# Patient Record
Sex: FEMALE | Race: OTHER | Employment: OTHER | URBAN - METROPOLITAN AREA
[De-identification: names, ages, dates, MRNs, and addresses within clinical notes are randomized per-mention and may not be internally consistent; named-entity substitution may affect disease eponyms.]

---

## 2020-01-20 ENCOUNTER — HOSPITAL ENCOUNTER (OUTPATIENT)
Age: 60
Setting detail: SPECIMEN
Discharge: HOME | End: 2020-01-20
Payer: COMMERCIAL

## 2020-01-23 LAB
HPV SAMPLE: NORMAL
HPV, GENOTYPE 16: NOT DETECTED
HPV, GENOTYPE 18: NOT DETECTED
HPV, HIGH RISK OTHER: NOT DETECTED
HPV, INTERPRETATION: NORMAL
SPECIMEN DESCRIPTION: NORMAL

## 2020-01-29 LAB — CYTOLOGY REPORT: NORMAL

## 2021-09-03 ENCOUNTER — HOSPITAL ENCOUNTER (OUTPATIENT)
Dept: WOMENS IMAGING | Age: 61
Discharge: HOME | End: 2021-09-03
Payer: COMMERCIAL

## 2021-09-03 DIAGNOSIS — Z12.31 VISIT FOR SCREENING MAMMOGRAM: ICD-10-CM

## 2021-09-03 PROCEDURE — 77063 BREAST TOMOSYNTHESIS BI: CPT

## 2022-06-28 ENCOUNTER — OFFICE VISIT (OUTPATIENT)
Dept: CARDIOLOGY CLINIC | Age: 62
End: 2022-06-28
Payer: COMMERCIAL

## 2022-06-28 VITALS
WEIGHT: 164 LBS | SYSTOLIC BLOOD PRESSURE: 130 MMHG | DIASTOLIC BLOOD PRESSURE: 78 MMHG | BODY MASS INDEX: 30.96 KG/M2 | HEIGHT: 61 IN | HEART RATE: 105 BPM

## 2022-06-28 DIAGNOSIS — R94.31 ABNORMAL EKG: ICD-10-CM

## 2022-06-28 DIAGNOSIS — Z01.818 PREOPERATIVE CLEARANCE: ICD-10-CM

## 2022-06-28 DIAGNOSIS — I10 PRIMARY HYPERTENSION: ICD-10-CM

## 2022-06-28 DIAGNOSIS — R53.83 OTHER FATIGUE: Primary | ICD-10-CM

## 2022-06-28 PROCEDURE — G8427 DOCREV CUR MEDS BY ELIG CLIN: HCPCS | Performed by: INTERNAL MEDICINE

## 2022-06-28 PROCEDURE — G8417 CALC BMI ABV UP PARAM F/U: HCPCS | Performed by: INTERNAL MEDICINE

## 2022-06-28 PROCEDURE — 99244 OFF/OP CNSLTJ NEW/EST MOD 40: CPT | Performed by: INTERNAL MEDICINE

## 2022-06-28 NOTE — PROGRESS NOTES
[address] [facility] [place] [address]  [place] [address]  Dept: [phone]  Dept Fax: [phone]  Loc: [phone]    Visit Date: 6/28/2022    Ms. Nghia Guerra is a 58 y.o. female  who presented for:    New referral  Abnormal EKG  Preoperative cardiac risk assessment     Notice: family present, Khmer interpretor   HPI:   RC   Brooks James is a pleasant 58year old female patient who  has a past medical history of DDD (degenerative disc disease), Frozen shoulder, GERD (gastroesophageal reflux disease), HTN (hypertension), Hyperlipidemia, Lumbar radiculopathy, Obesity, Osteoarthritis, Tobacco abuse, and Type II or unspecified type diabetes mellitus without mention of complication, not stated as uncontrolled. Patient is scheduled for ACDF C4-C5. She was referred for preoperative cardiac risk assessment, abnormal EKG. EKG revealed SR, possible left atrial enlargement, Q waves in the inferior leads (inferior infarct). The patient is a poor historian. She denies known cardiac history. Her father had CAD, CABG. Patient denies chest pain. She reports fatigue, gets tired more easily.        Current Outpatient Medications:     Dulaglutide (TRULICITY) 3.62 CA/9.4LJ SOPN, Inject 0.75 mg into the skin once a week On Tuesday, Disp: , Rfl:     losartan (COZAAR) 50 MG tablet, Take 50 mg by mouth daily, Disp: , Rfl:     HYDROcodone-acetaminophen (NORCO) 5-325 MG per tablet, Take 1 tablet by mouth every 8 hours as needed for Pain  As needed for pain., Disp: 90 tablet, Rfl: 0    atorvastatin (LIPITOR) 10 MG tablet, Take 1 tablet by mouth daily, Disp: 30 tablet, Rfl: 5    cyclobenzaprine (FLEXERIL) 10 MG tablet, TAKE 1 TABLET BY MOUTH EVERY EVENING (Patient taking differently: Take 10 mg by mouth 2 times daily as needed TAKE 1 TABLET BY MOUTH EVERY EVENING), Disp: 30 tablet, Rfl: 5    meloxicam (MOBIC) 15 MG tablet, Take 1 tablet by mouth daily, Disp: 30 tablet, Rfl: 5    Insulin Pen Needle (B-D ULTRAFINE III SHORT PEN) 31G X 8 MM MISC, 1 each by Does not apply route daily, Disp: 30 each, Rfl: 11    metFORMIN (GLUCOPHAGE) 1000 MG tablet, Take 1 tablet by mouth 2 times daily (with meals), Disp: 60 tablet, Rfl: 5    glucose blood VI test strips (FREESTYLE LITE) strip, Apply 1 each topically three times daily, Disp: 90 each, Rfl: 11    insulin glargine (LANTUS SOLOSTAR) 100 UNIT/ML injection pen, Inject 10 Units into the skin nightly (Patient taking differently: Inject 50 Units into the skin nightly ), Disp: 1 Pen, Rfl: 5    aspirin EC 81 MG EC tablet, Take 1 tablet by mouth daily, Disp: 30 tablet, Rfl: 11    FREESTYLE LANCETS MISC, Apply 1 each topically 3 times daily. , Disp: 100 each, Rfl: 11    glucose monitoring kit (FREESTYLE) monitoring kit, Use as directed, Disp: 1 kit, Rfl: 11    Past Medical History  Jens Barrera  has a past medical history of DDD (degenerative disc disease), Frozen shoulder, GERD (gastroesophageal reflux disease), HTN (hypertension), Hyperlipidemia, Lumbar radiculopathy, Obesity, Osteoarthritis, Tobacco abuse, and Type II or unspecified type diabetes mellitus without mention of complication, not stated as uncontrolled. Social History  Christine  reports that she has been smoking cigarettes. She has a 30.00 pack-year smoking history. She has never used smokeless tobacco. She reports that she does not drink alcohol and does not use drugs. Family History  Jens Barrera family history includes Arthritis in her mother; Diabetes in her brother; Diabetes type 2  in her father; Heart Disease in her father and sister; High Blood Pressure in her sister; Hypertension in her father; Pancreatic Cancer in her brother; Parkinson's Disease in her mother.     Past Surgical History   Past Surgical History:   Procedure Laterality Date     SECTION      x3    SHOULDER SURGERY      left and right       Review of Systems   Constitutional: Negative for chills and fever  HENT: Negative for congestion, sinus pressure, sneezing and sore throat. Eyes: Negative for pain, discharge, redness and itching. Respiratory: Negative for apnea, cough  Gastrointestinal: Negative for blood in stool, constipation, diarrhea   Endocrine: Negative for cold intolerance, heat intolerance, polydipsia. Genitourinary: Negative for dysuria, enuresis, flank pain and hematuria. Musculoskeletal: Negative for arthralgias, joint swelling and neck pain. Neurological: Negative for numbness and headaches. Psychiatric/Behavioral: Negative for agitation, confusion, decreased concentration and dysphoric mood. Objective: There were no vitals taken for this visit. Wt Readings from Last 3 Encounters:   06/14/22 167 lb 3.2 oz (75.8 kg)   10/17/16 196 lb (88.9 kg)   07/19/16 193 lb (87.5 kg)     BP Readings from Last 3 Encounters:   06/14/22 106/67   10/17/16 130/60   07/19/16 134/76       Nursing note and vitals reviewed. Physical Exam   Constitutional: Oriented to person, place, and time. Appears well-developed and well-nourished. ENT: Moist mucous membranes. No bleeding. Tongue is midline. Head: Normocephalic and atraumatic. Eyes: EOM are normal. Pupils are equal, round, and reactive to light. Neck: Normal range of motion. Neck supple. No JVD present. Cardiovascular: Normal rate, regular rhythm, II/vi systolic murmur, no rubs, and intact distal pulses. Pulmonary/Chest: Effort normal and breath sounds normal. No respiratory distress. No wheezes. No rales. Abdominal: Soft. Bowel sounds are normal. No distension. There is no tenderness. Musculoskeletal: Normal range of motion. no edema. Neurological: Alert and oriented to person, place, and time. No cranial nerve deficit. Coordination normal.   Skin: Skin is warm and dry. Psychiatric: Normal mood and affect.        No results found for: CKTOTAL, CKMB, CKMBINDEX    Lab Results   Component Value Date    WBC 13.0 06/06/2022    RBC 4.73 06/06/2022    HGB 13.8 06/06/2022    HCT 41.6 06/06/2022    MCV 87.9 06/06/2022    MCH 29.1 06/06/2022    MCHC 33.1 06/06/2022    RDW 13.0 06/06/2022     06/06/2022    MPV 8.9 05/09/2017       Lab Results   Component Value Date     06/06/2022    K 4.4 06/06/2022     06/06/2022    CO2 24 06/06/2022    BUN 38 06/06/2022    LABALBU 4.2 06/06/2022    CREATININE 1.27 06/06/2022    CALCIUM 9.60 06/06/2022    LABGLOM 74 05/09/2017    GLUCOSE 157 06/06/2022       Lab Results   Component Value Date    ALKPHOS 92 07/02/2014    ALT 26 07/02/2014    AST 23 07/02/2014    PROT 8.0 07/02/2014    BILITOT 0.2 07/02/2014    LABALBU 4.2 06/06/2022       No results found for: MG    No results found for: INR, PROTIME      Lab Results   Component Value Date    LABA1C 10.7 06/06/2022       Lab Results   Component Value Date    TRIG 206 04/03/2014    HDL 29 04/03/2014    LDLCALC 51 04/03/2014       No results found for: TSH      Testing Reviewed:      I have individually reviewed the cardiac test below:    ECHO: No results found for this or any previous visit. AssessmentPlan:   Jose Luis Bone is a pleasant 58year old female patient who  has a past medical history of DDD (degenerative disc disease), Frozen shoulder, GERD (gastroesophageal reflux disease), HTN (hypertension), Hyperlipidemia, Lumbar radiculopathy, Obesity, Osteoarthritis, Tobacco abuse, and Type II or unspecified type diabetes mellitus without mention of complication, not stated as uncontrolled. Patient is scheduled for ACDF C4-C5. She was referred for preoperative cardiac risk assessment, abnormal EKG. EKG revealed SR, possible left atrial enlargement, Q waves in the inferior leads (inferior infarct). The patient is a poor historian. She denies known cardiac history. Her father had CAD, CABG. Patient denies chest pain. She reports fatigue, gets tired more easily. 1. Fatigue   2.  Abnormal EKG  3. Preoperative cardiac risk assessment   4. HTN  5. DM  6. Dyslipidemia  7. Obesity   8. FH of CAD  9. Tobacco abuse      EKG noted   Has multiple risk factors including HTN (hypertension), Hyperlipidemia,Tobacco abuse, and Type II or unspecified type diabetes mellitus    Patient is scheduled for ACDF C4-C5. She was referred for preoperative cardiac risk assessment, abnormal EKG   EKG revealed SR, possible left atrial enlargement, Q waves in the inferior leads (inferior infarct)   The patient reports fatigue    ? Ischemic etiology    Echo, stress test    Risk stratification pending above studies    Smoking: discussed with the patient the importance of smoke cessation especially with the risk of CAD    Above findings and plan of care were discussed with patient in details, patient's questions were answered.      Disposition:  RTC in 12 months             Electronically signed by Margareth Reyes MD, [facility]    6/28/2022 at 1:13 PM EDT

## 2022-07-08 ENCOUNTER — TELEPHONE (OUTPATIENT)
Dept: CARDIOLOGY CLINIC | Age: 62
End: 2022-07-08

## 2022-07-08 NOTE — TELEPHONE ENCOUNTER
Left msg for pt to call office, informed echo has been denied and will be cancelled at this time  Request pt to call office for new arrival time for stress test  Arrive 7:30am, same instructions

## 2022-07-19 ENCOUNTER — HOSPITAL ENCOUNTER (OUTPATIENT)
Dept: NON INVASIVE DIAGNOSTICS | Age: 62
Discharge: HOME | End: 2022-07-19
Payer: COMMERCIAL

## 2022-07-19 ENCOUNTER — APPOINTMENT (OUTPATIENT)
Dept: NON INVASIVE DIAGNOSTICS | Age: 62
End: 2022-07-19
Payer: COMMERCIAL

## 2022-07-19 DIAGNOSIS — R53.83 OTHER FATIGUE: ICD-10-CM

## 2022-07-19 DIAGNOSIS — I10 PRIMARY HYPERTENSION: ICD-10-CM

## 2022-07-19 DIAGNOSIS — R94.31 ABNORMAL EKG: ICD-10-CM

## 2022-07-19 DIAGNOSIS — Z01.818 PREOPERATIVE CLEARANCE: ICD-10-CM

## 2022-07-19 PROCEDURE — 78452 HT MUSCLE IMAGE SPECT MULT: CPT

## 2022-07-19 PROCEDURE — 93017 CV STRESS TEST TRACING ONLY: CPT | Performed by: INTERNAL MEDICINE

## 2022-07-19 PROCEDURE — A9500 TC99M SESTAMIBI: HCPCS | Performed by: INTERNAL MEDICINE

## 2022-07-19 PROCEDURE — 6360000002 HC RX W HCPCS

## 2022-07-19 PROCEDURE — 3430000000 HC RX DIAGNOSTIC RADIOPHARMACEUTICAL: Performed by: INTERNAL MEDICINE

## 2022-07-19 RX ADMIN — Medication 9 MILLICURIE: at 08:00

## 2022-07-19 RX ADMIN — Medication 33.6 MILLICURIE: at 08:45

## 2022-07-20 ENCOUNTER — TELEPHONE (OUTPATIENT)
Dept: CARDIOLOGY CLINIC | Age: 62
End: 2022-07-20

## 2022-07-20 DIAGNOSIS — R94.31 ABNORMAL EKG: ICD-10-CM

## 2022-07-20 DIAGNOSIS — R94.39 ABNORMAL STRESS TEST: Primary | ICD-10-CM

## 2022-07-20 DIAGNOSIS — I10 PRIMARY HYPERTENSION: ICD-10-CM

## 2022-07-20 NOTE — TELEPHONE ENCOUNTER
Given abnormal stress test  Pending [facility]  High probability of CAD  Symptoms  Full Echo is recommended, plz reorder

## 2022-07-21 ENCOUNTER — TELEPHONE (OUTPATIENT)
Dept: CARDIOLOGY CLINIC | Age: 62
End: 2022-07-21

## 2022-07-21 NOTE — TELEPHONE ENCOUNTER
Result note for Stress test Mac Pleva)    ----- Message from Wendy Saha MD sent at 7/20/2022  4:57 PM EDT -----  Schedule [facility] on 8/

## 2022-07-21 NOTE — TELEPHONE ENCOUNTER
Result note for Stress test Rebecca Conn)    ----- Message from Andres Haynes MD sent at 7/20/2022  4:57 PM EDT -----  Schedule [facility] on 8/10 at 7 am

## 2022-07-22 ENCOUNTER — TELEPHONE (OUTPATIENT)
Dept: CARDIOLOGY CLINIC | Age: 62
End: 2022-07-22

## 2022-07-22 NOTE — TELEPHONE ENCOUNTER
PROCEDURE: cardiac cath    DATE OF SERVICE: 08/10/2022    SERVICE LOCATION: [facility]    CPT CODE: 50029    PHYSICIAN: kristy    DATE PRIOR AUTH SUBMITTED: 07/22/2022    STATUS: APPROVED    CASE NUMBER: [id number]    AUTH NUMBER: [id number]    VALID:  08/10/2022-11/10/2022

## 2022-07-27 NOTE — TELEPHONE ENCOUNTER
Case is pending medical review, and is set to  today. Reason they are unable to above is because patient has to have one of the following     -A chest pain syndrome.  -Unexpected chest pain suggesting insufficient blood flow to your heart.  -A heart problem that you were born with.  -Prior cardiac arrest (heart stopped) that was thought to be due to insufficient blood flow  or an infarction (death of tissue). -Prior heart attack. -Known hardening or narrowing of your blood vessels or other heart-related disease.  -Suspected graft or stent (tiny tube) closure.  -Heart related chest pain that happens when you are at rest.  -A defect in your heart that causes the blood to take a path through the heart that is not  normal (shunt), or a passage between two organs or vessels that is not normal (fistula). -Physical damage to your heart.  -Plans for a heart-related surgery.  -High risk problems with decreased blood flow to your heart, and plans for a high-risk  surgery that is not related to your heart. As stated several times in multiple clinical notes, patient does have chest pain syndrome, and patient has tried medication management, and had all required testing completed.      This cardiac cath has been denied two times in the past.     There are no providers available to do a peer to peer today, so if case expires I will have to submit an appeal.

## 2022-07-29 ENCOUNTER — HOSPITAL ENCOUNTER (OUTPATIENT)
Dept: NON INVASIVE DIAGNOSTICS | Age: 62
Discharge: HOME | End: 2022-07-29
Payer: COMMERCIAL

## 2022-07-29 DIAGNOSIS — R94.31 ABNORMAL EKG: ICD-10-CM

## 2022-07-29 DIAGNOSIS — I10 PRIMARY HYPERTENSION: ICD-10-CM

## 2022-07-29 DIAGNOSIS — R94.39 ABNORMAL STRESS TEST: ICD-10-CM

## 2022-07-29 LAB
LV EF: 58 %
LVEF MODALITY: NORMAL

## 2022-07-29 PROCEDURE — 93306 TTE W/DOPPLER COMPLETE: CPT

## 2022-08-09 ENCOUNTER — PREP FOR PROCEDURE (OUTPATIENT)
Dept: CARDIOLOGY | Age: 62
End: 2022-08-09

## 2022-08-09 DIAGNOSIS — Z01.818 PREOP TESTING: Primary | ICD-10-CM

## 2022-08-09 RX ORDER — SODIUM CHLORIDE 0.9 % (FLUSH) 0.9 %
5-40 SYRINGE (ML) INJECTION PRN
Status: CANCELLED | OUTPATIENT
Start: 2022-08-09

## 2022-08-09 RX ORDER — ASPIRIN 325 MG
325 TABLET ORAL ONCE
Status: CANCELLED | OUTPATIENT
Start: 2022-08-09 | End: 2022-08-09

## 2022-08-09 RX ORDER — SODIUM CHLORIDE 9 MG/ML
INJECTION, SOLUTION INTRAVENOUS CONTINUOUS
Status: CANCELLED | OUTPATIENT
Start: 2022-08-09

## 2022-08-09 RX ORDER — SODIUM CHLORIDE 0.9 % (FLUSH) 0.9 %
5-40 SYRINGE (ML) INJECTION EVERY 12 HOURS SCHEDULED
Status: CANCELLED | OUTPATIENT
Start: 2022-08-09

## 2022-08-09 RX ORDER — NITROGLYCERIN 0.4 MG/1
0.4 TABLET SUBLINGUAL EVERY 5 MIN PRN
Status: CANCELLED | OUTPATIENT
Start: 2022-08-09

## 2022-08-09 RX ORDER — SODIUM CHLORIDE 9 MG/ML
INJECTION, SOLUTION INTRAVENOUS PRN
Status: CANCELLED | OUTPATIENT
Start: 2022-08-09

## 2022-08-10 ENCOUNTER — HOSPITAL ENCOUNTER (OUTPATIENT)
Dept: INPATIENT UNIT | Age: 62
Discharge: HOME | End: 2022-08-10
Attending: INTERNAL MEDICINE | Admitting: INTERNAL MEDICINE
Payer: COMMERCIAL

## 2022-08-10 ENCOUNTER — APPOINTMENT (OUTPATIENT)
Dept: INTERVENTIONAL RADIOLOGY/VASCULAR | Age: 62
End: 2022-08-10
Attending: INTERNAL MEDICINE
Payer: COMMERCIAL

## 2022-08-10 ENCOUNTER — APPOINTMENT (OUTPATIENT)
Dept: CT IMAGING | Age: 62
End: 2022-08-10
Attending: INTERNAL MEDICINE
Payer: COMMERCIAL

## 2022-08-10 VITALS
WEIGHT: 168 LBS | HEART RATE: 88 BPM | TEMPERATURE: 98.1 F | RESPIRATION RATE: 22 BRPM | HEIGHT: 61 IN | DIASTOLIC BLOOD PRESSURE: 63 MMHG | OXYGEN SATURATION: 98 % | SYSTOLIC BLOOD PRESSURE: 112 MMHG | BODY MASS INDEX: 31.72 KG/M2

## 2022-08-10 DIAGNOSIS — E78.5 HYPERLIPIDEMIA, UNSPECIFIED HYPERLIPIDEMIA TYPE: ICD-10-CM

## 2022-08-10 PROBLEM — R94.39 ABNORMAL STRESS TEST: Status: ACTIVE | Noted: 2022-08-10

## 2022-08-10 PROBLEM — Z01.818 PREOPERATIVE CLEARANCE: Status: ACTIVE | Noted: 2022-08-10

## 2022-08-10 PROBLEM — I25.10 CAD IN NATIVE ARTERY: Status: ACTIVE | Noted: 2022-08-10

## 2022-08-10 PROBLEM — R53.83 OTHER FATIGUE: Status: ACTIVE | Noted: 2022-08-10

## 2022-08-10 LAB
ABO: NORMAL
ANION GAP SERPL CALCULATED.3IONS-SCNC: 12 MEQ/L (ref 8–16)
ANTIBODY SCREEN: NORMAL
APTT: 31.8 SECONDS (ref 22–38)
BUN BLDV-MCNC: 39 MG/DL (ref 7–22)
CALCIUM SERPL-MCNC: 9.4 MG/DL (ref 8.5–10.5)
CHLORIDE BLD-SCNC: 109 MEQ/L (ref 98–111)
CHOLESTEROL, TOTAL: 167 MG/DL (ref 100–199)
CO2: 20 MEQ/L (ref 23–33)
CREAT SERPL-MCNC: 1.3 MG/DL (ref 0.4–1.2)
ERYTHROCYTE [DISTWIDTH] IN BLOOD BY AUTOMATED COUNT: 13.2 % (ref 11.5–14.5)
ERYTHROCYTE [DISTWIDTH] IN BLOOD BY AUTOMATED COUNT: 44.6 FL (ref 35–45)
GFR SERPL CREATININE-BSD FRML MDRD: 41 ML/MIN/1.73M2
GLUCOSE BLD-MCNC: 93 MG/DL (ref 70–108)
HCT VFR BLD CALC: 39.4 % (ref 37–47)
HDLC SERPL-MCNC: 28 MG/DL
HEMOGLOBIN: 12.4 GM/DL (ref 12–16)
INR BLD: 1.05 (ref 0.85–1.13)
LDL CHOLESTEROL CALCULATED: 83 MG/DL
MCH RBC QN AUTO: 29.1 PG (ref 26–33)
MCHC RBC AUTO-ENTMCNC: 31.5 GM/DL (ref 32.2–35.5)
MCV RBC AUTO: 92.5 FL (ref 81–99)
PLATELET # BLD: 271 THOU/MM3 (ref 130–400)
PMV BLD AUTO: 9.4 FL (ref 9.4–12.4)
POTASSIUM SERPL-SCNC: 4.4 MEQ/L (ref 3.5–5.2)
RBC # BLD: 4.26 MILL/MM3 (ref 4.2–5.4)
RH FACTOR: NORMAL
SODIUM BLD-SCNC: 141 MEQ/L (ref 135–145)
TRIGL SERPL-MCNC: 278 MG/DL (ref 0–199)
WBC # BLD: 13.9 THOU/MM3 (ref 4.8–10.8)

## 2022-08-10 PROCEDURE — 2500000003 HC RX 250 WO HCPCS

## 2022-08-10 PROCEDURE — 86901 BLOOD TYPING SEROLOGIC RH(D): CPT

## 2022-08-10 PROCEDURE — 6360000002 HC RX W HCPCS

## 2022-08-10 PROCEDURE — 6370000000 HC RX 637 (ALT 250 FOR IP): Performed by: PHYSICIAN ASSISTANT

## 2022-08-10 PROCEDURE — 86850 RBC ANTIBODY SCREEN: CPT

## 2022-08-10 PROCEDURE — 93458 L HRT ARTERY/VENTRICLE ANGIO: CPT

## 2022-08-10 PROCEDURE — 85730 THROMBOPLASTIN TIME PARTIAL: CPT

## 2022-08-10 PROCEDURE — 80061 LIPID PANEL: CPT

## 2022-08-10 PROCEDURE — 93971 EXTREMITY STUDY: CPT

## 2022-08-10 PROCEDURE — 85027 COMPLETE CBC AUTOMATED: CPT

## 2022-08-10 PROCEDURE — 86900 BLOOD TYPING SEROLOGIC ABO: CPT

## 2022-08-10 PROCEDURE — 71250 CT THORAX DX C-: CPT

## 2022-08-10 PROCEDURE — 93880 EXTRACRANIAL BILAT STUDY: CPT

## 2022-08-10 PROCEDURE — 93458 L HRT ARTERY/VENTRICLE ANGIO: CPT | Performed by: INTERNAL MEDICINE

## 2022-08-10 PROCEDURE — 2580000003 HC RX 258: Performed by: PHYSICIAN ASSISTANT

## 2022-08-10 PROCEDURE — 36415 COLL VENOUS BLD VENIPUNCTURE: CPT

## 2022-08-10 PROCEDURE — C1769 GUIDE WIRE: HCPCS

## 2022-08-10 PROCEDURE — 93005 ELECTROCARDIOGRAM TRACING: CPT | Performed by: PHYSICIAN ASSISTANT

## 2022-08-10 PROCEDURE — 6360000004 HC RX CONTRAST MEDICATION: Performed by: INTERNAL MEDICINE

## 2022-08-10 PROCEDURE — 99245 OFF/OP CONSLTJ NEW/EST HI 55: CPT | Performed by: THORACIC SURGERY (CARDIOTHORACIC VASCULAR SURGERY)

## 2022-08-10 PROCEDURE — 85610 PROTHROMBIN TIME: CPT

## 2022-08-10 PROCEDURE — 80048 BASIC METABOLIC PNL TOTAL CA: CPT

## 2022-08-10 PROCEDURE — C1894 INTRO/SHEATH, NON-LASER: HCPCS

## 2022-08-10 RX ORDER — SODIUM CHLORIDE 0.9 % (FLUSH) 0.9 %
5-40 SYRINGE (ML) INJECTION PRN
Status: DISCONTINUED | OUTPATIENT
Start: 2022-08-10 | End: 2022-08-10 | Stop reason: HOSPADM

## 2022-08-10 RX ORDER — NITROGLYCERIN 0.4 MG/1
0.4 TABLET SUBLINGUAL EVERY 5 MIN PRN
Status: DISCONTINUED | OUTPATIENT
Start: 2022-08-10 | End: 2022-08-10 | Stop reason: HOSPADM

## 2022-08-10 RX ORDER — ASPIRIN 81 MG/1
81 TABLET ORAL DAILY
Qty: 90 TABLET | Refills: 1 | Status: SHIPPED | OUTPATIENT
Start: 2022-08-10

## 2022-08-10 RX ORDER — ACETAMINOPHEN 325 MG/1
650 TABLET ORAL EVERY 4 HOURS PRN
Status: DISCONTINUED | OUTPATIENT
Start: 2022-08-10 | End: 2022-08-10 | Stop reason: HOSPADM

## 2022-08-10 RX ORDER — SODIUM CHLORIDE 0.9 % (FLUSH) 0.9 %
5-40 SYRINGE (ML) INJECTION EVERY 12 HOURS SCHEDULED
Status: DISCONTINUED | OUTPATIENT
Start: 2022-08-10 | End: 2022-08-10 | Stop reason: HOSPADM

## 2022-08-10 RX ORDER — ATORVASTATIN CALCIUM 40 MG/1
40 TABLET, FILM COATED ORAL DAILY
Qty: 30 TABLET | Refills: 3 | Status: SHIPPED | OUTPATIENT
Start: 2022-08-10 | End: 2022-12-08

## 2022-08-10 RX ORDER — SODIUM CHLORIDE 9 MG/ML
INJECTION, SOLUTION INTRAVENOUS CONTINUOUS
Status: DISCONTINUED | OUTPATIENT
Start: 2022-08-10 | End: 2022-08-10 | Stop reason: HOSPADM

## 2022-08-10 RX ORDER — ASPIRIN 325 MG
325 TABLET ORAL ONCE
Status: COMPLETED | OUTPATIENT
Start: 2022-08-10 | End: 2022-08-10

## 2022-08-10 RX ORDER — SODIUM CHLORIDE 9 MG/ML
INJECTION, SOLUTION INTRAVENOUS PRN
Status: DISCONTINUED | OUTPATIENT
Start: 2022-08-10 | End: 2022-08-10

## 2022-08-10 RX ORDER — SODIUM CHLORIDE 9 MG/ML
INJECTION, SOLUTION INTRAVENOUS PRN
Status: DISCONTINUED | OUTPATIENT
Start: 2022-08-10 | End: 2022-08-10 | Stop reason: HOSPADM

## 2022-08-10 RX ORDER — SODIUM CHLORIDE 0.9 % (FLUSH) 0.9 %
5-40 SYRINGE (ML) INJECTION PRN
Status: DISCONTINUED | OUTPATIENT
Start: 2022-08-10 | End: 2022-08-10

## 2022-08-10 RX ORDER — SODIUM CHLORIDE 0.9 % (FLUSH) 0.9 %
5-40 SYRINGE (ML) INJECTION EVERY 12 HOURS SCHEDULED
Status: DISCONTINUED | OUTPATIENT
Start: 2022-08-10 | End: 2022-08-10

## 2022-08-10 RX ADMIN — IOPAMIDOL 30 ML: 755 INJECTION, SOLUTION INTRAVENOUS at 07:57

## 2022-08-10 RX ADMIN — ASPIRIN 325 MG: 325 TABLET ORAL at 06:08

## 2022-08-10 RX ADMIN — SODIUM CHLORIDE: 9 INJECTION, SOLUTION INTRAVENOUS at 06:02

## 2022-08-10 ASSESSMENT — ENCOUNTER SYMPTOMS
SHORTNESS OF BREATH: 1
EYE DISCHARGE: 0
ABDOMINAL PAIN: 0
COLOR CHANGE: 0

## 2022-08-10 ASSESSMENT — PAIN SCALES - GENERAL: PAINLEVEL_OUTOF10: 0

## 2022-08-10 ASSESSMENT — LIFESTYLE VARIABLES: HOW OFTEN DO YOU HAVE A DRINK CONTAINING ALCOHOL: NEVER

## 2022-08-10 NOTE — PROCEDURES
[address] [id number]                            CARDIAC CATHETERIZATION    PATIENT NAME: Levon Garcia             :        1960  MED REC NO:   [id number]                           ROOM:       0012  ACCOUNT NO:   [de-identified]                           ADMIT DATE: 08/10/2022  PROVIDER:     Marianna Almeida MD    DATE OF PROCEDURE:  08/10/2022    INDICATIONS FOR PROCEDURE:  Abnormal stress test.  Fatigue and angina  equivalent symptoms. Preoperative cardiac risk assessment. Multiple  risk factors for coronary artery disease. Family history of coronary  artery disease. Diabetes mellitus, hypertension, dyslipidemia, obesity  and known history of tobacco abuse. PROCEDURES PERFORMED:  1. Left cardiac catheterization with selective coronary angiogram.  2.  Left ventriculogram.    DESCRIPTION OF PROCEDURE:  After informed consent, the patient was  brought to the cardiac catheterization room. She was prepped and draped  in a sterile fashion. 2% lidocaine was injected in the skin and  subcutaneous tissue overlying the right radial artery. Under ultrasound  guidance using modified Seldinger technique, access was obtained in the  right radial artery. 5/6 Slender sheath was inserted. Standard  antithrombotic/antispasmodic medications were given. I used 5-Swiss  JR4, 5-Swiss JL3.5 diagnostic catheters to complete the procedure. FINDINGS:  LEFT VENTRICULOGRAM:  No regional wall motion abnormality. Ejection  fraction 50% to 55%. HEMODYNAMICS:  Left ventricular end-diastolic pressure 9 mmHg. No  significant pressure gradient across the aortic valve upon pullback. CORONARY ANGIOGRAM:  1. RIGHT CORONARY ARTERY:  Dominant vessel. Proximal RCA is patent. Mid RCA has chronic total occlusion. Distal RCA does receive  left-to-right collaterals.   2.  LEFT MAIN CORONARY ARTERY:  Distal left main coronary artery has 50%  to 60% stenosis. Gives rise to ramus intermedius, left circumflex, and  left anterior descending arteries. 3.  RAMUS INTERMEDIUS:  Proximal part of the vessel has 70% to 80%  stenosis. 4.  LEFT CIRCUMFLEX ARTERY:  Mild-to-moderate diffuse disease,  relatively small, nondominant vessel. 5.  LEFT ANTERIOR DESCENDING ARTERY:  Proximal LAD has 10% to 20%  stenosis. Mid LAD has 80% to 90% stenosis. Distal LAD with mild  diffuse disease. MEDICATIONS:  See MAR. COMPLICATIONS:  None. ESTIMATED BLOOD LOSS:  Less than 50 mL. ACCESS:  Right radial artery access. Vasc Band was applied. Hemostasis  was achieved. IMPRESSION:  Multivessel coronary artery disease including severe  stenosis of left main coronary artery, left anterior descending artery  and ramus intermedius artery. Chronic total occlusion of the right  coronary artery. RECOMMENDATIONS:  Bedrest for the next two hours. Access site care. Medical therapy and aggressive risk factor modification. Aspirin 81 mg  p.o. daily. High intensity statin therapy. Avoid nonsteroidal  anti-inflammatory drugs. The patient has prior prohibitive cardiac risk  for planned spinal surgery, will need to hold off for now. Consult  Cardiovascular Surgery, heart team discussion.         Shari Martínez MD    D: 08/10/2022 8:43:43       T: 08/10/2022 8:46:07     [id number]  Job#: [id number]     Doc#: [id number]    CC:

## 2022-08-10 NOTE — DISCHARGE INSTRUCTIONS
Discharge Instructions for Radial Heart Catherization  1. Take it easy for 3-4 days. 2.  No driving for 2 days. 3.  No lifting of 5 lbs or more for 5 days with the affected arm. 4.  May shower after 24 hours. 5.  Remove arm board after 24 hours. 6.  Apply a band aid to the insertion site daily for 5 days. Wash site daily with soap and water. 7.  No creams, ointments, or powders near the insertion site. 8.  No tub baths, swimming, hot tubs, or hand washing dishes for 1 week. 9.  Watch for signs of infection (redness, warmth, swelling, or pus drainage) or coolness of extremity and call physician if this occurs  10. If bleeding occurs from insertion site, apply pressure and call 911. 11. Watch for numbness/tingling- if this occurs go to the Emergency Room immediately. 12. Apply Ice for 15 minutes with an hour break in between and alternate with heat compress for 15 minutes to reduce swelling for 3-5 days.

## 2022-08-10 NOTE — BRIEF OP NOTE
[address] [address]  Sedation/Analgesia Post Sedation Record    Pt Name: Buffy Nieves  Account number: [de-identified]  MRN: [id number]  YOB: 1960  Procedure Performed By: Gretchen Lee MD MD   Primary Care Physician: Shohba Rojas MD  Date: 8/10/2022    POST-PROCEDURE    Physicians/Assistants: Gretchen Lee MD MD     Procedure Performed:Cath      Sedation/Anesthesia: Versed/ Fentanyl and 2% xylocaine local anesthesia. Estimated Blood Loss: < 50 ml. Specimens Removed: None         Disposition of Specimen: N/A        Complications: No Immediate Complications. Post-procedure Diagnosis/Findings:       MV CAD (Severe stenoses of LM, LAD and RI,  of the RCA)      Recommendations:  Bed rest for the next 2 hours  Access site care  Medical therapy and aggressive risk factor modification   ASA 81 mg PO daily   Statin therapy  Avoid NSAIDs  Patient has high (prohibitive) cardiac risk for the planned spinal surgery  Consult CV surgery, heart team discussion      Above findings and plan of care were discussed with patient (through an interpretor) and her family, questions were answered, agreeable with plan.        Gretchen Lee MD, Jasbir Olguin   Electronically signed 8/10/2022 at 8:05 AM  Interventional Cardiology

## 2022-08-10 NOTE — PROGRESS NOTES
[id number] Patient admitted to 2E12  Ambulatory for [facility]. Patient NPO. Patient accompanied by spouse. Vital signs obtained. Assessment and data collection intiated. Oriented to room. Policies and procedures for 2E explained. All questions answered with no further questions at this time. Fall prevention and safety precautions discussed with patient. 36 Spoke with patient regarding holding metformin after procedure due to dye reaction, voices understanding, instructed patient additional instructions on discharge. Using translation video monitor.

## 2022-08-10 NOTE — PROGRESS NOTES
Patient given discharge instructions and discussed with patient and spouse, sister in law and neice at bedside using translation video monitor. Pt has no further questions at this time. Belongings gathered. Pt leaving for home with spouse. Pt made aware importance of discussing the open heart with her family and to call the office as soon as possible to get scheduled for possible next Friday procedure.

## 2022-08-10 NOTE — CONSULTS
Cardiothoracic Surgery History & Physical       Patient:  Nina Parra  YOB: 1960    MRN: [id number]     Acct: [de-identified]    PCP: Mirian Newberry MD    Date of Admission: 8/10/2022    Chief Complaint:  No chief complaint on file. History Of Present Illness:  58 y.o. diabetic female, no previous history of CAD, originally referred for preoperative clearance for a cervical orthopedic procedure. Abnormal EKG prompted LHC, showing 3v CAD, including severe left main disease, and RCA . Patient reports only chronic progressive fatigue and dyspnea on exertion; denies chest pain, presyncope, or orthopnea. Past Medical History:          Diagnosis Date    CAD in native artery 8/10/2022    DDD (degenerative disc disease)     Frozen shoulder     Left with arthritis    GERD (gastroesophageal reflux disease)     HTN (hypertension) 10/31/2013    Hyperlipidemia 2/3/2014    Lumbar radiculopathy     Obesity     Osteoarthritis     Tobacco abuse     Type II or unspecified type diabetes mellitus without mention of complication, not stated as uncontrolled        Past Surgical History:          Procedure Laterality Date     SECTION      x3    SHOULDER SURGERY      left and right       Medications Prior to Admission:      Prior to Admission medications    Medication Sig Start Date End Date Taking? Authorizing Provider   atorvastatin (LIPITOR) 40 MG tablet Take 1 tablet by mouth in the morning. 8/10/22 12/8/22 Yes Jose Barajas MD   aspirin EC 81 MG EC tablet Take 1 tablet by mouth in the morning. 8/10/22  Yes Jose Barajas MD   Dulaglutide (TRULICITY) 6.07 YY/5.1FL SOPN Inject 0.75 mg into the skin once a week On Tuesday    Historical Provider, MD   losartan (COZAAR) 50 MG tablet Take 50 mg by mouth daily    Historical Provider, MD   HYDROcodone-acetaminophen (NORCO) 5-325 MG per tablet Take 1 tablet by mouth every 8 hours as needed for Pain  As needed for pain.  16 Jacquie Chopra MD   cyclobenzaprine (FLEXERIL) 10 MG tablet TAKE 1 TABLET BY MOUTH EVERY EVENING 10/17/16   Jacquie Chopra MD   Insulin Pen Needle (B-D ULTRAFINE III SHORT PEN) 31G X 8 MM MISC 1 each by Does not apply route daily 10/17/16   Jacquie Chopra MD   meloxicam [facility]) 15 MG tablet Take 1 tablet by mouth daily 10/17/16 8/10/22  Jacquie Chopra MD   metFORMIN (GLUCOPHAGE) 1000 MG tablet Take 1 tablet by mouth 2 times daily (with meals) 7/19/16   Jacquie Chopra MD   glucose blood VI test strips (FREESTYLE LITE) strip Apply 1 each topically three times daily 4/20/16   Jacquie Chopra MD   insulin glargine (LANTUS SOLOSTAR) 100 UNIT/ML injection pen Inject 10 Units into the skin nightly 4/20/16   Jacquie Chopra MD   FREESTYLE LANCETS MISC Apply 1 each topically 3 times daily. 2/3/14   Jacquie Chopra MD   glucose monitoring kit (FREESTYLE) monitoring kit Use as directed 2/3/14   Jacquie Chopra MD       Allergies:  Tramadol    Social History:      TOBACCO:   reports that she has been smoking cigarettes. She has a 30.00 pack-year smoking history. She has never used smokeless tobacco.  ETOH:   reports no history of alcohol use. Social History     Substance and Sexual Activity   Drug Use No         Family History:          Problem Relation Age of Onset    Parkinson's Disease Mother     Arthritis Mother     Diabetes type 2  Father     Heart Disease Father     Hypertension Father     Heart Disease Sister     High Blood Pressure Sister     Pancreatic Cancer Brother     Diabetes Brother        REVIEW OFSYSTEMS:      Review of Systems   Constitutional:  Positive for activity change and fatigue. HENT:  Negative for dental problem. Eyes:  Negative for discharge. Respiratory:  Positive for shortness of breath. Cardiovascular:  Negative for chest pain and palpitations.    Gastrointestinal:  Negative for abdominal pain. Endocrine: Negative for cold intolerance. Genitourinary:  Negative for difficulty urinating. Musculoskeletal:  Positive for neck pain. Skin:  Negative for color change. Allergic/Immunologic: Negative for environmental allergies. Neurological:  Negative for dizziness. Hematological:  Negative for adenopathy. Psychiatric/Behavioral:  Negative for agitation. PHYSICALEXAM:    BP (!) 95/57   Pulse 81   Temp 98.1 °F (36.7 °C) (Oral)   Resp 18   Ht 5' 1\" (1.549 m)   Wt 168 lb (76.2 kg)   SpO2 95%   BMI 31.74 kg/m²     General appearance:  No apparent distress, appears stated age and cooperative. HEENT:  Normal cephalic, atraumatic withoutobvious deformity. Pupils equal, round, and reactive to light. Extra ocular muscles intact. Conjunctivae/corneas clear. Neck: Supple, with full range of motion. No jugular venous distention. Trachea midline. Respiratory:  Normal respiratory effort. Clear to auscultation, bilaterally without Rales/Wheezes/Rhonchi. Cardiovascular:  Regular rate and rhythm with normal S1/S2 without murmurs, rubs or gallops. Abdomen: Soft,non-tender, non-distended with normal bowel sounds. Musculoskeletal:  No clubbing, cyanosis or edemabilaterally. Full range of motion without deformity. Skin: Skin color, texture, turgor normal.  No rashes orlesions. Neurologic:  Neurovascularly intact without any focal sensory/motor deficits. Cranial nerves: II-XIIintact, grossly non-focal.  Psychiatric:  Alert and oriented, thought content appropriate, normal insight  Capillary Refill: Brisk,< 3 seconds   PeripheralPulses: +2 palpable, equal bilaterally       Labs:    Recent Labs     08/10/22  0547   WBC 13.9*   HGB 12.4   HCT 39.4        Recent Labs     08/10/22  0547      K 4.4      CO2 20*   BUN 39*   CREATININE 1.3*   CALCIUM 9.4     No results for input(s): AST, ALT, BILIDIR, BILITOT, ALKPHOS in the last 72 hours.   Recent Labs     08/10/22  0547 INR 1.05     No results for input(s): Pita Rosario in the last 72 hours. Urinalysis:    No results found for: Lucina Kathy, BACTERIA, RBCUA, BLOODU, SPECGRAV, GLUCOSEU    ECHO: Results for orders placed during the hospital encounter of 07/29/22    Echo 2D w doppler w color complete    Narrative  Transthoracic Echocardiography Report (TTE)    Demographics    Patient Name  Eloy Alicia     Gender            Female  Denice Lock    MR #          [id number]         Race              Other    Ethnicity          or     Account #     [de-identified]         Room Number    Accession     [id number]         Date of Study     07/29/2022  Number    Date of Birth 1960        Referring         Tosin Ramirez MD  Physician         Symone Saba MD    Age           58 year(s)        Sonographer       MARIA ELENA Pringle,  RDCS, RDMS, RVT    Interpreting      Hannah Stacy MD  Physician    Procedure    Type of Study    TTE procedure:ECHOCARDIOGRAM COMPLETE 2D W DOPPLER W COLOR. Procedure Date  Date: 07/29/2022 Start: 02:16 PM    Study Location: Echo Lab  Technical Quality: Limited visualization due to body habitus. Indications:Abnormal ECG and Hypertension. Additional Medical History:hyperlipidemia, hypertension, gastroesophageal  reflux disease, osteoarthritis, tobacco abuse, family history of coronary  artery disease, diabetes    Patient Status: Routine    Height: 1 inches Weight: 164 pounds BSA: 0.09 m^2 BMI: 312979.35 kg/m^2    BP: 130/78 mmHg    Conclusions    Summary  Left ventricular size and systolic function is normal. Ejection fraction  was estimated at 55-60%. LV wall thickness is within normal limits and  there are no obvious wall motion abnormalities. The right ventricular size appears normal with normal systolic function  and wall thickness.     Signature    ----------------------------------------------------------------  Electronically signed by Hannah Stacy MD (Interpreting  physician) on 07/30/2022 at 11:41 AM  ----------------------------------------------------------------    Findings    Mitral Valve  The mitral valve structure is normal with normal leaflet separation. DOPPLER: The transmitral velocity was within the normal range with no  evidence for mitral stenosis. There was no evidence of mitral  regurgitation. Aortic Valve  The aortic valve appears trileaflet with normal thickness and leaflet  excursion. DOPPLER: Transaortic velocity was within the normal range with  no evidence of aortic stenosis. There was no evidence of aortic  regurgitation. Tricuspid Valve  The tricuspid valve structure is normal with normal leaflet separation. DOPPLER: There is no evidence of tricuspid stenosis. Mild tricuspid regurgitation visualized. Pulmonic Valve  The pulmonic valve leaflets appear normal thickness, and normal cuspal  separation. DOPPLER: The transpulmonic velocity was within the normal  range. No evidence for regurgitation. Left Atrium  Left atrial size is normal.    Left Ventricle  Left ventricular size and systolic function is normal. Ejection fraction  was estimated at 55-60%. LV wall thickness is within normal limits and  there are no obvious wall motion abnormalities. Right Atrium  Right atrial size was normal.    Right Ventricle  The right ventricular size appears normal with normal systolic function  and wall thickness. Pericardial Effusion  The pericardium appears normal with no evidence of a pericardial effusion. Pleural Effusion  No evidence of pleural effusion. Aorta / Great Vessels  -Aortic root dimension within normal limits. -IVC size is within normal limits with normal respiratory phasic changes.     M-Mode/2D Measurements & Calculations    LV Diastolic   LV Systolic Dimension: 3  AV Cusp Separation: 1.3 cmLA  Dimension: 4.5 cm                        Dimension: 3.2 cmAO Root  cm             LV Volume Diastolic: 62.5 Dimension: 2.9 cmLA Area: 17.6  LV FS:33.3 %   ml                        cm^2  LV PW          LV Volume Systolic: 35 ml  Diastolic: 1.3 LV EDV/LV EDV Index: 92.4  cm             ml/1027 m^2LV ESV/LV ESV  Septum         Index: 35 ml/389 m^2      RV Diastolic Dimension: 2.8 cm  Diastolic: 0.9 EF Calculated: 62.1 %  cm                                       LA/Aorta: 1.1  Ascending Aorta: 3.2 cm  LA volume/Index: 54.7 ml /608m^2  LVOT: 1.9 cm    Doppler Measurements & Calculations    MV Peak E-Wave: 79.9 cm/s  AV Peak Velocity:     LVOT Peak Velocity: 78.8  MV Peak A-Wave: 93.5 cm/s  91.2 cm/s             cm/s  MV E/A Ratio: 0.85         AV Peak Gradient:     LVOT Peak Gradient: 2  MV Peak Gradient: 2.55     3.33 mmHg             mmHg  mmHg  TV Peak E-Wave: 61.3 cm/s  MV Deceleration Time: 148                        TV Peak A-Wave: 47.6 cm/s  msec  IVRT: 109 msec        TV Peak Gradient: 1.5  mmHg  MV E' Septal Velocity: 5.8                       TR Velocity:217 cm/s  cm/s                       AV DVI (Vmax):0.86    TR Gradient:18.84 mmHg  MV A' Septal Velocity: 8.2                       PV Peak Velocity: 72.4  cm/s                                             cm/s  MV E' Lateral Velocity:                          PV Peak Gradient: 2.1  7.2 cm/s                                         mmHg  MV A' Lateral Velocity:  11.5 cm/s  E/E' septal: 13.78  E/E' lateral: 11.1  MR Velocity: 311 cm/s    [id number]      Radiology:     CT chest: I have reviewed the CT chest images with the following interpretation: pending  Left heart cath:  I have reviewed the left heart catheterization images with the following interpretation: as per HPI      Code Status: Full Code      ASSESSMENT:    Active Hospital Problems    Diagnosis Date Noted    Preoperative clearance [Z01.818] 08/10/2022     Priority: High    CAD in native artery [I25.10] 08/10/2022     Priority: High    Abnormal stress test [R94.39] 08/10/2022     Priority: High    Other fatigue [R53.83] 08/10/2022     Priority: High       PLAN:  58year old female with 3v CAD, including severe left main stenosis, with preserved LV function. Recommendation of CABGx3-4, proposed 8/19. Patient wishes to discuss this with her family prior to committing to proceeding. Patient understands risks of AMI associated with her CAD. Issues discussed in detail with the patient, who understands and has no further questions. Time spent with patient: 120 minutes, of which more than 50% was spent counseling/coordinating the patient's care.     Electronically signed by Kalli Ji MD on 8/10/2022 at 8:52 AM

## 2022-08-10 NOTE — H&P
[address] [place] [address]  Sedation/Analgesia History & Physical    Pt Name: Tracy Dwyer  Account number: [de-identified]  MRN: [id number]  YOB: 1960  Provider Performing Procedure: Douglas Catherine MD MD  Referring Provider: Douglas Catherine MD   Primary Care Physician: Sofi Contreras MD  Date: 8/10/2022    PRE-PROCEDURE    Code Status: FULL CODE  Brief History/Pre-Procedure Diagnosis:   Abnormal stress test  Fatigue, angina equivalent symptoms  Preoperative cardiac risk assessment  Multiple risk factors for CAD   FH of CAD, DM, HTN, dyslipidemia, obesity, H/o tobacco abuse     Consent: : I have discussed with the patient risks, benefits, and alternatives (and relevant risks, benefits, and side effects related to alternatives or not receiving care), and likelihood of the success. The patient and/or representative appear to understand and agree to proceed. The discussion encompasses risks, benefits, and side effects related to the alternatives and the risks related to not receiving the proposed care, treatment, and services. The indication, risks and benefits of the procedure and possible therapeutic consequences and alternatives were discussed with the patient. The patient was given the opportunity to ask questions and to have them answered to his/her satisfaction.  Risks of the procedure include but are not limited to the following: Bleeding, hematoma including retroperitoneal hemmorhage, infection, pain and discomfort, injury to the aorta and other blood vessels, rhythm disturbance, low blood pressure, myocardial infarction, stroke, kidney damage/failure, myocardial perforation, allergic reactions to sedatives/contrast material, loss of pulse/vascular compromise requiring surgery, aneurysm/pseudoaneurysm formation, possible loss of a limb/hand/leg, needing blood transfusion, requiring emergent open heart surgery or emergent coronary intervention, the need for intubation/respiratory support, the requirement for defibrillation/cardioversion, and death. Alternatives to and omission of the suggested procedure were discussed. The patient had no further questions and wished to proceed; the consent form was signed. MEDICAL HISTORY  []ASHD/ANGINA/MI/CHF   []Hypertension  []Diabetes  []Hyperlipidemia  []Smoking  []Family Hx of ASHD  []Additional information:       has a past medical history of DDD (degenerative disc disease), Frozen shoulder, GERD (gastroesophageal reflux disease), HTN (hypertension), Hyperlipidemia, Lumbar radiculopathy, Obesity, Osteoarthritis, Tobacco abuse, and Type II or unspecified type diabetes mellitus without mention of complication, not stated as uncontrolled. SURGICAL HISTORY   has a past surgical history that includes shoulder surgery and  section. Additional information:       ALLERGIES   Allergies as of 08/10/2022 - Fully Reviewed 08/10/2022   Allergen Reaction Noted    Tramadol Nausea And Vomiting 2013     Additional information:       MEDICATIONS   Aspirin  [] 81 mg  [] 325 mg  [] None  Antiplatelet drug therapy use last 5 days  []No []Yes  Coumadin Use Last 5 Days []No []Yes  Other anticoagulant use last 5 days  []No []Yes    Current Facility-Administered Medications:     0.9 % sodium chloride infusion, , IntraVENous, Continuous, Rock Loach, PA-C, Last Rate: 75 mL/hr at 08/10/22 0602, New Bag at 08/10/22 0602    sodium chloride flush 0.9 % injection 5-40 mL, 5-40 mL, IntraVENous, 2 times per day, Rock Loach, PA-C    sodium chloride flush 0.9 % injection 5-40 mL, 5-40 mL, IntraVENous, PRN, Rock Loach, PA-C    0.9 % sodium chloride infusion, , IntraVENous, PRN, Rock Loach, PA-C    nitroGLYCERIN (NITROSTAT) SL tablet 0.4 mg, 0.4 mg, SubLINGual, Q5 Min PRN,  Loach, PA-C  Prior to Admission medications    Medication Sig Start Date End Date Taking?  Authorizing Provider   Dulaglutide murmurs   Lungs: Clear to auscultation    Abdomen: BS present, without HSM, masses, or tenderness    Extremities: without C,C,E.  Pulses 2+ bilaterally   Mental Status: Alert & Oriented        PLANNED PROCEDURE   [x]Cath  [x]PCI                []Pacemaker/AICD  []JODY             []Cardioversion []Peripheral angiography/PTA  []Other:      SEDATION  Planned agent:[x]Midazolam []Meperidine []Sublimaze []Morphine  []Diazepam  []Other:     ASA Classification:  []1 [x]2 []3 []4 []5   Class 1: A normal healthy patient  Class 2: Pt with mild to moderate systemic disease  Class 3: Severe systemic disease or disturbance  Class 4: Severe systemic disorders that are already life threatening. Class 5: Moribund pt with little chances of survival, for more than 24 hours. Mallampati I Airway Classification:   []1 []2 [x]3 []4     [x]Pre-procedure diagnostic studies complete and results available. Comment:    [x]Previous sedation/anesthesia experiences assessed. Comment:    [x]The patient is an appropriate candidate to undergo the planned procedure sedation and anesthesia. (Refer to nursing sedation/analgesia documentation record)  [x]Formulation and discussion of sedation/procedure plan, risks, and expectations with patient and/or responsible adult completed. [x]Patient examined immediately prior to the procedure.  (Refer to nursing sedation/analgesia documentation record)      Jack Bowen MD, Kristel Law    Electronically signed 8/10/2022 at 7:13 AM

## 2022-08-11 ENCOUNTER — TELEPHONE (OUTPATIENT)
Dept: CARDIOTHORACIC SURGERY | Age: 62
End: 2022-08-11

## 2022-08-11 LAB
EKG ATRIAL RATE: 97 BPM
EKG P AXIS: 73 DEGREES
EKG P-R INTERVAL: 184 MS
EKG Q-T INTERVAL: 358 MS
EKG QRS DURATION: 84 MS
EKG QTC CALCULATION (BAZETT): 454 MS
EKG R AXIS: 28 DEGREES
EKG T AXIS: 50 DEGREES
EKG VENTRICULAR RATE: 97 BPM

## 2022-08-11 PROCEDURE — 93010 ELECTROCARDIOGRAM REPORT: CPT | Performed by: INTERNAL MEDICINE

## 2022-08-11 NOTE — TELEPHONE ENCOUNTER
LMOM for sister-in-law Jamia (on HIPAA) to schedule appt for patient per Dr. Kale Posadas request  Dx: CAD

## 2022-08-12 ENCOUNTER — TELEPHONE (OUTPATIENT)
Dept: CARDIOTHORACIC SURGERY | Age: 62
End: 2022-08-12

## 2022-08-12 NOTE — TELEPHONE ENCOUNTER
Patient's sister Francois Ore called stating that her sister has an appointment with Dr. Samson Agustin on 8/18/2022 but patient was told she needs to have sx on 08/19/2022     Per Dr. Aubree Hubbard note it looks like patient had to discuss that date with her family, so a clinic appointment was made. Patient would like to proceed with sx on 08/19/2022. I informed patient's sister in-law  that patient should still plan on coming to her office visit on 08/18/2022 with Danay Lam, and Holley Ruiz CMA. Will call her on Monday after she speaks with Dr. Samson Agustin. In regards to if the office visit is still needed,or if office visit can be cancelled, and sx can be scheduled for 08/19/2022. Darya Cabrera can be reached at [phone], she asks that the phone is returned to her and not Christine bc she does not speak english.

## 2022-08-15 RX ORDER — AMIODARONE HYDROCHLORIDE 200 MG/1
200 TABLET ORAL ONCE
Status: CANCELLED | OUTPATIENT
Start: 2022-08-15 | End: 2022-08-15

## 2022-08-15 RX ORDER — SODIUM CHLORIDE 0.9 % (FLUSH) 0.9 %
5-40 SYRINGE (ML) INJECTION PRN
Status: CANCELLED | OUTPATIENT
Start: 2022-08-15

## 2022-08-15 RX ORDER — CHLORHEXIDINE GLUCONATE 4 G/100ML
SOLUTION TOPICAL ONCE
Status: CANCELLED | OUTPATIENT
Start: 2022-08-15 | End: 2022-08-15

## 2022-08-15 RX ORDER — SODIUM CHLORIDE 9 MG/ML
INJECTION, SOLUTION INTRAVENOUS PRN
Status: CANCELLED | OUTPATIENT
Start: 2022-08-15

## 2022-08-15 RX ORDER — CHLORHEXIDINE GLUCONATE 0.12 MG/ML
15 RINSE ORAL ONCE
Status: CANCELLED | OUTPATIENT
Start: 2022-08-15 | End: 2022-08-15

## 2022-08-15 RX ORDER — SODIUM CHLORIDE 0.9 % (FLUSH) 0.9 %
5-40 SYRINGE (ML) INJECTION EVERY 12 HOURS SCHEDULED
Status: CANCELLED | OUTPATIENT
Start: 2022-08-15

## 2022-08-15 NOTE — TELEPHONE ENCOUNTER
PRIOR AUTHORIZATION:    PENDING    Faxed authorization request form to [phone]  Received confirmation. CPT: 09628  ICD: I25.10, R53.82, R06.00  DOS: 8/19/22    Clinical documentation faxed:  STRESS TEST, ECHO, EKG, LHC, CONSULT FROM DR. LYNN    **MARKED URGENT

## 2022-08-15 NOTE — TELEPHONE ENCOUNTER
Called and spoke with Fran Crawford from [employer]-  Wanted to verify that Josue Reed request has been received. She informed me that they are still loading forms from 1100AM today- I sent my request over around 130PM  Will check again tomorrow.

## 2022-08-15 NOTE — TELEPHONE ENCOUNTER
Spoke with sister-in-law Jamia  Informed her that Dr. Terryann Goldberg does not need to see her in the office on Thursday and we can just proceed with surgery on Friday  Ok per Dr. Terryann Goldberg. Rhesa Cabot is scheduled for CABG/JODY with DR Daryl Koch on 8/19/22 at 0AM. Shaheen Later agreed to date/time. Surgery instructions are as follows:  - Arrive to [facility] at 530AM  - NPO after midnight the night before surgery  - OK to continue ASA 81 MG  - STOP taking METFORMIN 2 days prior to surgery  - Take 1/2 dose of INSULIN the morning of surgery  - Discontinue use of vitamins, herbs, and supplements 7 days prior to surgery (or ASAP)  - PAT visit scheduled for 8/18/22 at [facility] Hallie asked that I mail the instructions to her, she does not check her email and Rhesa Cabot does not have MyChart - asked the  overnight them. Shaheen Later denied questions or concerns at this time. Voiced all understanding. Primary insurance is [employer]. Will obtain prior authorization.

## 2022-08-16 PROCEDURE — APPSS15 APP SPLIT SHARED TIME 0-15 MINUTES: Performed by: PHYSICIAN ASSISTANT

## 2022-08-16 NOTE — PROGRESS NOTES
[facility] Adult Cardiac Surgery   Database Version 4.20  RISK SCORES  Procedure: Isolated CAB  Risk of Mortality:  1.461%  Renal Failure:  2.152%  Permanent Stroke:  1.814%  Prolonged Ventilation:  7.679%  DSW Infection:  0.304%  Reoperation:  1.155%  Morbidity or Mortality:  11.301%  Short Length of Stay:  43.151%  Long Length of Stay:  4.092%

## 2022-08-17 NOTE — TELEPHONE ENCOUNTER
Chaya Beatty, spoke with [facility]  Case is PENDING  Ref #[id number]    Per [facility], turnaround time is usually 10 calendar days. I informed her that I did ming the case urgent d/t DOS of 8/19- which she could not see on her end. I asked that she ming the case urgent then, but she informed me that that does not guarantee that the case will be looked at urgently (?)    Will attempt to call back later today and tomorrow.

## 2022-08-17 NOTE — PROGRESS NOTES
PAT appointment reminder call  Arrival time and location given 8/18 at 08:00 in [facility]  Bring drivers license and insurance  Bring list of medications with dosage and frequency  If possible bring caregiver for appointment  Appointment may last 2 hours

## 2022-08-17 NOTE — TELEPHONE ENCOUNTER
APPROVED    AUTH # [id number]  VALID FOR 1 DAY  CPT CODE 10905 DOES NOT REQUIRE PRIOR AUTHORIZATION  **CLINICAL FOR CONTINUED STAY REVIEW IS REQUIRED FOR ADDITIONAL DAYS**    Scanned into media.

## 2022-08-18 ENCOUNTER — HOSPITAL ENCOUNTER (OUTPATIENT)
Dept: GENERAL RADIOLOGY | Age: 62
Discharge: HOME | DRG: 166 | End: 2022-08-18
Payer: COMMERCIAL

## 2022-08-18 ENCOUNTER — ANESTHESIA EVENT (OUTPATIENT)
Dept: OPERATING ROOM | Age: 62
DRG: 166 | End: 2022-08-18
Payer: COMMERCIAL

## 2022-08-18 ENCOUNTER — HOSPITAL ENCOUNTER (OUTPATIENT)
Dept: PREADMISSION TESTING | Age: 62
Discharge: HOME | DRG: 166 | End: 2022-08-22
Payer: COMMERCIAL

## 2022-08-18 VITALS
RESPIRATION RATE: 16 BRPM | SYSTOLIC BLOOD PRESSURE: 128 MMHG | HEART RATE: 79 BPM | WEIGHT: 167.77 LBS | OXYGEN SATURATION: 97 % | BODY MASS INDEX: 31.68 KG/M2 | TEMPERATURE: 97 F | HEIGHT: 61 IN | DIASTOLIC BLOOD PRESSURE: 75 MMHG

## 2022-08-18 DIAGNOSIS — Z01.818 PREOP TESTING: ICD-10-CM

## 2022-08-18 LAB
AVERAGE GLUCOSE: 150 MG/DL (ref 70–126)
BACTERIA: ABNORMAL
BILIRUBIN URINE: NEGATIVE
BLOOD, URINE: NEGATIVE
CASTS: ABNORMAL /LPF
CASTS: ABNORMAL /LPF
CHARACTER, URINE: CLEAR
COLOR: YELLOW
CRYSTALS: ABNORMAL
EPITHELIAL CELLS, UA: ABNORMAL /HPF
ERYTHROCYTE [DISTWIDTH] IN BLOOD BY AUTOMATED COUNT: 12.7 % (ref 11.5–14.5)
ERYTHROCYTE [DISTWIDTH] IN BLOOD BY AUTOMATED COUNT: 42.9 FL (ref 35–45)
GLUCOSE, URINE: NEGATIVE MG/DL
HBA1C MFR BLD: 7 % (ref 4.4–6.4)
HCT VFR BLD CALC: 40.1 % (ref 37–47)
HEMOGLOBIN: 12.6 GM/DL (ref 12–16)
INR BLD: 1.13 (ref 0.85–1.13)
KETONES, URINE: NEGATIVE
LEUKOCYTE EST, POC: NEGATIVE
MCH RBC QN AUTO: 28.9 PG (ref 26–33)
MCHC RBC AUTO-ENTMCNC: 31.4 GM/DL (ref 32.2–35.5)
MCV RBC AUTO: 92 FL (ref 81–99)
MISCELLANEOUS LAB TEST RESULT: ABNORMAL
MRSA NASAL SCREEN RT-PCR: NEGATIVE
NITRITE, URINE: NEGATIVE
PH UA: 6.5 (ref 5–9)
PLATELET # BLD: 273 THOU/MM3 (ref 130–400)
PMV BLD AUTO: 9.4 FL (ref 9.4–12.4)
PROTEIN UA: 30 MG/DL
RBC # BLD: 4.36 MILL/MM3 (ref 4.2–5.4)
RBC URINE: ABNORMAL /HPF
RENAL EPITHELIAL, UA: ABNORMAL
SPECIFIC GRAVITY UA: 1.02 (ref 1–1.03)
STAPH AUREUS SCREEN RT-PCR: NEGATIVE
UROBILINOGEN, URINE: 0.2 EU/DL (ref 0–1)
WBC # BLD: 10.4 THOU/MM3 (ref 4.8–10.8)
WBC UA: ABNORMAL /HPF
YEAST: ABNORMAL

## 2022-08-18 PROCEDURE — 87640 STAPH A DNA AMP PROBE: CPT

## 2022-08-18 PROCEDURE — 87641 MR-STAPH DNA AMP PROBE: CPT

## 2022-08-18 PROCEDURE — 83036 HEMOGLOBIN GLYCOSYLATED A1C: CPT

## 2022-08-18 PROCEDURE — 93005 ELECTROCARDIOGRAM TRACING: CPT

## 2022-08-18 PROCEDURE — 71046 X-RAY EXAM CHEST 2 VIEWS: CPT

## 2022-08-18 PROCEDURE — 81001 URINALYSIS AUTO W/SCOPE: CPT

## 2022-08-18 PROCEDURE — 85610 PROTHROMBIN TIME: CPT

## 2022-08-18 PROCEDURE — 85027 COMPLETE CBC AUTOMATED: CPT

## 2022-08-18 ASSESSMENT — PAIN DESCRIPTION - FREQUENCY: FREQUENCY: CONTINUOUS

## 2022-08-18 ASSESSMENT — PAIN DESCRIPTION - LOCATION: LOCATION: BACK

## 2022-08-18 ASSESSMENT — PAIN DESCRIPTION - ORIENTATION: ORIENTATION: LOWER

## 2022-08-18 ASSESSMENT — PAIN SCALES - GENERAL: PAINLEVEL_OUTOF10: 2

## 2022-08-18 ASSESSMENT — PAIN DESCRIPTION - DESCRIPTORS: DESCRIPTORS: OTHER (COMMENT)

## 2022-08-18 ASSESSMENT — PAIN - FUNCTIONAL ASSESSMENT: PAIN_FUNCTIONAL_ASSESSMENT: PREVENTS OR INTERFERES SOME ACTIVE ACTIVITIES AND ADLS

## 2022-08-18 NOTE — PROGRESS NOTES
Preliminary Discharge Planning Questionnaire  Date of Surgery 08/19-22   Surgeon Dr Kathie Dumont      Having the proper help and care after surgery is very important to your recovery. Who will be able to help you at home when you are discharged from the hospital? ([facility] - 24/7 for a minimum of 2 weeks)    spouse    Name(s) chastity    How many steps to enter your home? 3    Bathroom on first floor? Yes    Bedroom on the first floor? Yes    Do you have an elevated toilet seat to use at home? No    Do you have a walker to use at home? Total Joints - with wheels N/A   Spine - with wheels  N/A     Have you been doing home exercises?no    *You will go home with some outpatient physical therapy, where do you prefer to go? [facility]    *If needed, what home health agency would you like to use?   [facility]    *Cardiac Rehab plans [facility]

## 2022-08-18 NOTE — PROGRESS NOTES
Pt is having pain in lower back that is constant. It is 2 /10 today. Described as \"dull ache in lower back. . \"    Pain scale and pain management review with patient.

## 2022-08-18 NOTE — PROGRESS NOTES
[employer]        8-18-22  ________ (date)   SECOND SHOWER: The day before surgery: Take a shower and wash your entire body, including your hair and scalp in the following manner:  Wash your hair using normal shampoo. Make sure you rinse the shampoo from your hair and body. Wash your face with your regular soap or cleanser. Use one of the sponges in the [facility] kit and apply 1/3 of the Chlorhexidine soap to the sponge, wash from your neck down. This is very important. Do not use the soap on your face or hair and avoid private areas. Rinse your body thoroughly. This is very important. Using a fresh, clean towel, dry your body. Dress in freshly washed clothes. Fresh clean sheets and pillow cases should be used after this shower. Do not use lotions, powders, or creams after this shower. ___[birth year]_____ (date)   THE FINAL SHOWER: The morning of surgery: Take a shower and wash your entire body, including your hair and scalp in the following manner:  Wash your hair using normal shampoo. Make sure you rinse the shampoo from your hair and body. Wash your face with your regular soap or cleanser. Use one of the sponges in the [facility] kit and apply 1/3 of the Chlorhexidine soap to the sponge, wash from your neck down. This is very important. Do not use the soap on your face or hair and avoid private areas. Rinse your body thoroughly. This is very important. Using a fresh, clean towel, dry your body. Dress warmly with freshly washed clean clothes. Keeping warm before surgery decreases your risk of developing and infection. Do not use lotions, powders, or creams after this shower.

## 2022-08-19 ENCOUNTER — ANESTHESIA (OUTPATIENT)
Dept: OPERATING ROOM | Age: 62
DRG: 166 | End: 2022-08-19
Payer: COMMERCIAL

## 2022-08-19 ENCOUNTER — HOSPITAL ENCOUNTER (INPATIENT)
Age: 62
LOS: 5 days | Discharge: HOME | DRG: 166 | End: 2022-08-24
Attending: THORACIC SURGERY (CARDIOTHORACIC VASCULAR SURGERY) | Admitting: THORACIC SURGERY (CARDIOTHORACIC VASCULAR SURGERY)
Payer: COMMERCIAL

## 2022-08-19 ENCOUNTER — APPOINTMENT (OUTPATIENT)
Dept: GENERAL RADIOLOGY | Age: 62
DRG: 166 | End: 2022-08-19
Attending: THORACIC SURGERY (CARDIOTHORACIC VASCULAR SURGERY)
Payer: COMMERCIAL

## 2022-08-19 DIAGNOSIS — Z95.1 S/P CABG X 3: Primary | ICD-10-CM

## 2022-08-19 PROBLEM — I25.10 CAD, MULTIPLE VESSEL: Status: ACTIVE | Noted: 2022-08-19

## 2022-08-19 LAB
ABO: NORMAL
ACTIVATED CLOTTING TIME: 134 SECONDS (ref 99–130)
ACTIVATED CLOTTING TIME: 168 SECONDS (ref 99–130)
ACTIVATED CLOTTING TIME: 563 SECONDS (ref 99–130)
ACTIVATED CLOTTING TIME: 618 SECONDS (ref 99–130)
ACTIVATED CLOTTING TIME: 723 SECONDS (ref 99–130)
ACTIVATED CLOTTING TIME: 833 SECONDS (ref 99–130)
ACTIVATED CLOTTING TIME: 853 SECONDS (ref 99–130)
ACTIVATED CLOTTING TIME: 963 SECONDS (ref 99–130)
ALLEN TEST: ABNORMAL
ANION GAP SERPL CALCULATED.3IONS-SCNC: 16 MEQ/L (ref 8–16)
ANTIBODY SCREEN: NORMAL
BASE EXCESS (CALCULATED): -0.2 MMOL/L (ref -2.5–2.5)
BASE EXCESS (CALCULATED): -0.4 MMOL/L (ref -2.5–2.5)
BASE EXCESS (CALCULATED): -0.8 MMOL/L (ref -2.5–2.5)
BASE EXCESS (CALCULATED): -1 MMOL/L (ref -2.5–2.5)
BASE EXCESS (CALCULATED): -2.5 MMOL/L (ref -2.5–2.5)
BASE EXCESS (CALCULATED): -3.1 MMOL/L (ref -2.5–2.5)
BASE EXCESS (CALCULATED): -4.1 MMOL/L (ref -2.5–2.5)
BASE EXCESS (CALCULATED): 0.4 MMOL/L (ref -2.5–2.5)
BASE EXCESS (CALCULATED): 0.4 MMOL/L (ref -2.5–2.5)
BASE EXCESS MIXED: -0.8 MMOL/L (ref -2–3)
BUN BLDV-MCNC: 15 MG/DL (ref 7–22)
CALCIUM IONIZED SERUM: 1.02 MMOL/L (ref 1.12–1.32)
CALCIUM IONIZED SERUM: 1.09 MMOL/L (ref 1.12–1.32)
CALCIUM IONIZED SERUM: 1.1 MMOL/L (ref 1.12–1.32)
CALCIUM IONIZED SERUM: 1.13 MMOL/L (ref 1.12–1.32)
CALCIUM IONIZED SERUM: 1.13 MMOL/L (ref 1.12–1.32)
CALCIUM IONIZED SERUM: 1.19 MMOL/L (ref 1.12–1.32)
CALCIUM IONIZED SERUM: 1.2 MMOL/L (ref 1.12–1.32)
CALCIUM IONIZED: 1.06 MMOL/L (ref 1.12–1.32)
CALCIUM IONIZED: 1.18 MMOL/L (ref 1.12–1.32)
CALCIUM SERPL-MCNC: 7.6 MG/DL (ref 8.5–10.5)
CARTRIDGE COLOR: NORMAL
CHLORIDE BLD-SCNC: 109 MEQ/L (ref 98–111)
CO2: 20 MEQ/L (ref 23–33)
COLLECTED BY:: ABNORMAL
COMMENT: ABNORMAL
CREAT SERPL-MCNC: 0.9 MG/DL (ref 0.4–1.2)
DEVICE: ABNORMAL
EKG ATRIAL RATE: 76 BPM
EKG P AXIS: 108 DEGREES
EKG P-R INTERVAL: 192 MS
EKG Q-T INTERVAL: 380 MS
EKG QRS DURATION: 84 MS
EKG QTC CALCULATION (BAZETT): 427 MS
EKG R AXIS: 164 DEGREES
EKG T AXIS: 160 DEGREES
EKG VENTRICULAR RATE: 76 BPM
ERYTHROCYTE [DISTWIDTH] IN BLOOD BY AUTOMATED COUNT: 13.2 % (ref 11.5–14.5)
ERYTHROCYTE [DISTWIDTH] IN BLOOD BY AUTOMATED COUNT: 43.8 FL (ref 35–45)
GFR SERPL CREATININE-BSD FRML MDRD: 63 ML/MIN/1.73M2
GLUCOSE BLD-MCNC: 137 MG/DL (ref 70–108)
GLUCOSE BLD-MCNC: 137 MG/DL (ref 70–108)
GLUCOSE BLD-MCNC: 140 MG/DL (ref 70–108)
GLUCOSE BLD-MCNC: 147 MG/DL (ref 70–108)
GLUCOSE BLD-MCNC: 160 MG/DL (ref 70–108)
GLUCOSE BLD-MCNC: 160 MG/DL (ref 70–108)
GLUCOSE BLD-MCNC: 171 MG/DL (ref 70–108)
GLUCOSE BLD-MCNC: 202 MG/DL (ref 70–108)
GLUCOSE BLD-MCNC: 229 MG/DL (ref 70–108)
GLUCOSE, WHOLE BLOOD: 132 MG/DL (ref 70–108)
GLUCOSE, WHOLE BLOOD: 162 MG/DL (ref 70–108)
GLUCOSE, WHOLE BLOOD: 185 MG/DL (ref 70–108)
GLUCOSE, WHOLE BLOOD: 196 MG/DL (ref 70–108)
GLUCOSE, WHOLE BLOOD: 203 MG/DL (ref 70–108)
GLUCOSE, WHOLE BLOOD: 204 MG/DL (ref 70–108)
GLUCOSE, WHOLE BLOOD: 206 MG/DL (ref 70–108)
GLUCOSE, WHOLE BLOOD: 209 MG/DL (ref 70–108)
GLUCOSE, WHOLE BLOOD: 210 MG/DL (ref 70–108)
GLUCOSE, WHOLE BLOOD: 272 MG/DL (ref 70–108)
HCO3, MIXED: 25 MMOL/L (ref 23–28)
HCO3: 23 MMOL/L (ref 23–28)
HCO3: 23 MMOL/L (ref 23–28)
HCO3: 24 MMOL/L (ref 23–28)
HCO3: 24 MMOL/L (ref 23–28)
HCO3: 25 MMOL/L (ref 23–28)
HCO3: 25 MMOL/L (ref 23–28)
HCO3: 26 MMOL/L (ref 23–28)
HCO3: 26 MMOL/L (ref 23–28)
HCO3: 28 MMOL/L (ref 23–28)
HCT VFR BLD CALC: 20.9 % (ref 37–47)
HCT VFR BLD CALC: 33.3 % (ref 37–47)
HCT VFR BLD CALC: 34 % (ref 37–47)
HEMOGLOBIN FINGERSTICK, POC: 6.5 G/DL (ref 12–16)
HEMOGLOBIN FINGERSTICK, POC: 6.8 G/DL (ref 12–16)
HEMOGLOBIN FINGERSTICK, POC: 7 G/DL (ref 12–16)
HEMOGLOBIN FINGERSTICK, POC: 7.1 G/DL (ref 12–16)
HEMOGLOBIN FINGERSTICK, POC: 7.3 G/DL (ref 12–16)
HEMOGLOBIN FINGERSTICK, POC: 9.9 G/DL (ref 12–16)
HEMOGLOBIN: 10.8 GM/DL (ref 12–16)
HEMOGLOBIN: 10.9 GM/DL (ref 12–16)
HEMOGLOBIN: 6.7 GM/DL (ref 12–16)
IFIO2: 30
IFIO2: 40
IFIO2: 80
INR BLD: 1.35 (ref 0.85–1.13)
MAGNESIUM: 2.8 MG/DL (ref 1.6–2.4)
MCH RBC QN AUTO: 30 PG (ref 26–33)
MCHC RBC AUTO-ENTMCNC: 32.7 GM/DL (ref 32.2–35.5)
MCV RBC AUTO: 91.7 FL (ref 81–99)
MODE: ABNORMAL
O2 SAT, MIXED: 78 %
O2 SATURATION: 100 %
O2 SATURATION: 97 %
O2 SATURATION: 99 %
O2 SATURATION: 99 %
PATIENT BOLUS: 9916
PATIENT HEPARIN CONCENTRATION: 0
PATIENT HEPARIN CONCENTRATION: 2
PATIENT HEPARIN CONCENTRATION: 3
PATIENT HEPARIN CONCENTRATION: 4
PCO2, MIXED VENOUS: 50 MMHG (ref 41–51)
PCO2: 41 MMHG (ref 35–45)
PCO2: 42 MMHG (ref 35–45)
PCO2: 42 MMHG (ref 35–45)
PCO2: 45 MMHG (ref 35–45)
PCO2: 47 MMHG (ref 35–45)
PCO2: 48 MMHG (ref 35–45)
PCO2: 50 MMHG (ref 35–45)
PCO2: 54 MMHG (ref 35–45)
PCO2: 56 MMHG (ref 35–45)
PH BLOOD GAS: 7.25 (ref 7.35–7.45)
PH BLOOD GAS: 7.3 (ref 7.35–7.45)
PH BLOOD GAS: 7.3 (ref 7.35–7.45)
PH BLOOD GAS: 7.32 (ref 7.35–7.45)
PH BLOOD GAS: 7.35 (ref 7.35–7.45)
PH BLOOD GAS: 7.35 (ref 7.35–7.45)
PH BLOOD GAS: 7.36 (ref 7.35–7.45)
PH BLOOD GAS: 7.38 (ref 7.35–7.45)
PH BLOOD GAS: 7.38 (ref 7.35–7.45)
PH, MIXED: 7.31 (ref 7.31–7.41)
PIP: 20 CMH2O
PIP: 20 CMH2O
PLATELET # BLD: 114 THOU/MM3 (ref 130–400)
PLATELET # BLD: 147 THOU/MM3 (ref 130–400)
PLATELET # BLD: 263 THOU/MM3 (ref 130–400)
PMV BLD AUTO: 9.1 FL (ref 9.4–12.4)
PO2 MIXED: 47 MMHG (ref 25–40)
PO2: 132 MMHG (ref 71–104)
PO2: 168 MMHG (ref 71–104)
PO2: 200 MMHG (ref 71–104)
PO2: 306 MMHG (ref 71–104)
PO2: 309 MMHG (ref 71–104)
PO2: 319 MMHG (ref 71–104)
PO2: 383 MMHG (ref 71–104)
PO2: 437 MMHG (ref 71–104)
PO2: 98 MMHG (ref 71–104)
POTASSIUM SERPL-SCNC: 3.6 MEQ/L (ref 3.5–5.2)
POTASSIUM SERPL-SCNC: 3.6 MEQ/L (ref 3.5–5.2)
POTASSIUM SERPL-SCNC: 4.8 MEQ/L (ref 3.5–5.2)
POTASSIUM, WHOLE BLOOD: 3.4 MEQ/L (ref 3.5–4.9)
POTASSIUM, WHOLE BLOOD: 3.5 MEQ/L (ref 3.5–4.9)
POTASSIUM, WHOLE BLOOD: 4.1 MEQ/L (ref 3.5–4.9)
POTASSIUM, WHOLE BLOOD: 4.6 MEQ/L (ref 3.5–4.9)
POTASSIUM, WHOLE BLOOD: 4.8 MEQ/L (ref 3.5–4.9)
POTASSIUM, WHOLE BLOOD: 4.9 MEQ/L (ref 3.5–4.9)
POTASSIUM, WHOLE BLOOD: 4.9 MEQ/L (ref 3.5–4.9)
PROJECTED HEPARIN CONCENTATION: 2
RANGE: NORMAL
RBC # BLD: 3.63 MILL/MM3 (ref 4.2–5.4)
RH FACTOR: NORMAL
SET PEEP: 6 MMHG
SET PRESS SUPP: 10 CMH2O
SET RESPIRATORY RATE: 16 BPM
SET RESPIRATORY RATE: 16 BPM
SODIUM BLD-SCNC: 145 MEQ/L (ref 135–145)
SODIUM, WHOLE BLOOD: 139 MEQ/L (ref 138–146)
SODIUM, WHOLE BLOOD: 141 MEQ/L (ref 138–146)
SODIUM, WHOLE BLOOD: 141 MEQ/L (ref 138–146)
SODIUM, WHOLE BLOOD: 142 MEQ/L (ref 138–146)
SODIUM, WHOLE BLOOD: 142 MEQ/L (ref 138–146)
SODIUM, WHOLE BLOOD: 143 MEQ/L (ref 138–146)
SODIUM, WHOLE BLOOD: 147 MEQ/L (ref 138–146)
SOURCE, BLOOD GAS: ABNORMAL
WBC # BLD: 24.2 THOU/MM3 (ref 4.8–10.8)

## 2022-08-19 PROCEDURE — P9045 ALBUMIN (HUMAN), 5%, 250 ML: HCPCS | Performed by: STUDENT IN AN ORGANIZED HEALTH CARE EDUCATION/TRAINING PROGRAM

## 2022-08-19 PROCEDURE — 2700000000 HC OXYGEN THERAPY PER DAY

## 2022-08-19 PROCEDURE — C1729 CATH, DRAINAGE: HCPCS | Performed by: THORACIC SURGERY (CARDIOTHORACIC VASCULAR SURGERY)

## 2022-08-19 PROCEDURE — 33533 CABG ARTERIAL SINGLE: CPT | Performed by: PHYSICIAN ASSISTANT

## 2022-08-19 PROCEDURE — B246ZZ4 ULTRASONOGRAPHY OF RIGHT AND LEFT HEART, TRANSESOPHAGEAL: ICD-10-PCS | Performed by: STUDENT IN AN ORGANIZED HEALTH CARE EDUCATION/TRAINING PROGRAM

## 2022-08-19 PROCEDURE — 2500000003 HC RX 250 WO HCPCS

## 2022-08-19 PROCEDURE — 2580000003 HC RX 258

## 2022-08-19 PROCEDURE — 83735 ASSAY OF MAGNESIUM: CPT

## 2022-08-19 PROCEDURE — 36415 COLL VENOUS BLD VENIPUNCTURE: CPT

## 2022-08-19 PROCEDURE — 3700000001 HC ADD 15 MINUTES (ANESTHESIA): Performed by: THORACIC SURGERY (CARDIOTHORACIC VASCULAR SURGERY)

## 2022-08-19 PROCEDURE — 2580000003 HC RX 258: Performed by: THORACIC SURGERY (CARDIOTHORACIC VASCULAR SURGERY)

## 2022-08-19 PROCEDURE — 84132 ASSAY OF SERUM POTASSIUM: CPT

## 2022-08-19 PROCEDURE — B2111ZZ FLUOROSCOPY OF MULTIPLE CORONARY ARTERIES USING LOW OSMOLAR CONTRAST: ICD-10-PCS | Performed by: THORACIC SURGERY (CARDIOTHORACIC VASCULAR SURGERY)

## 2022-08-19 PROCEDURE — 6370000000 HC RX 637 (ALT 250 FOR IP): Performed by: STUDENT IN AN ORGANIZED HEALTH CARE EDUCATION/TRAINING PROGRAM

## 2022-08-19 PROCEDURE — 33508 ENDOSCOPIC VEIN HARVEST: CPT | Performed by: PHYSICIAN ASSISTANT

## 2022-08-19 PROCEDURE — 85520 HEPARIN ASSAY: CPT

## 2022-08-19 PROCEDURE — 6360000002 HC RX W HCPCS: Performed by: STUDENT IN AN ORGANIZED HEALTH CARE EDUCATION/TRAINING PROGRAM

## 2022-08-19 PROCEDURE — 82330 ASSAY OF CALCIUM: CPT

## 2022-08-19 PROCEDURE — 86900 BLOOD TYPING SEROLOGIC ABO: CPT

## 2022-08-19 PROCEDURE — 33508 ENDOSCOPIC VEIN HARVEST: CPT | Performed by: THORACIC SURGERY (CARDIOTHORACIC VASCULAR SURGERY)

## 2022-08-19 PROCEDURE — 80048 BASIC METABOLIC PNL TOTAL CA: CPT

## 2022-08-19 PROCEDURE — 82803 BLOOD GASES ANY COMBINATION: CPT

## 2022-08-19 PROCEDURE — 85027 COMPLETE CBC AUTOMATED: CPT

## 2022-08-19 PROCEDURE — 85014 HEMATOCRIT: CPT

## 2022-08-19 PROCEDURE — 33518 CABG ARTERY-VEIN TWO: CPT | Performed by: THORACIC SURGERY (CARDIOTHORACIC VASCULAR SURGERY)

## 2022-08-19 PROCEDURE — 3600000018 HC SURGERY OHS ADDTL 15MIN: Performed by: THORACIC SURGERY (CARDIOTHORACIC VASCULAR SURGERY)

## 2022-08-19 PROCEDURE — 94660 CPAP INITIATION&MGMT: CPT

## 2022-08-19 PROCEDURE — 5A1221Z PERFORMANCE OF CARDIAC OUTPUT, CONTINUOUS: ICD-10-PCS | Performed by: THORACIC SURGERY (CARDIOTHORACIC VASCULAR SURGERY)

## 2022-08-19 PROCEDURE — 6370000000 HC RX 637 (ALT 250 FOR IP): Performed by: THORACIC SURGERY (CARDIOTHORACIC VASCULAR SURGERY)

## 2022-08-19 PROCEDURE — 82947 ASSAY GLUCOSE BLOOD QUANT: CPT

## 2022-08-19 PROCEDURE — 94761 N-INVAS EAR/PLS OXIMETRY MLT: CPT

## 2022-08-19 PROCEDURE — 5A1935Z RESPIRATORY VENTILATION, LESS THAN 24 CONSECUTIVE HOURS: ICD-10-PCS | Performed by: THORACIC SURGERY (CARDIOTHORACIC VASCULAR SURGERY)

## 2022-08-19 PROCEDURE — 86901 BLOOD TYPING SEROLOGIC RH(D): CPT

## 2022-08-19 PROCEDURE — 2500000003 HC RX 250 WO HCPCS: Performed by: THORACIC SURGERY (CARDIOTHORACIC VASCULAR SURGERY)

## 2022-08-19 PROCEDURE — 85018 HEMOGLOBIN: CPT

## 2022-08-19 PROCEDURE — 33533 CABG ARTERIAL SINGLE: CPT | Performed by: THORACIC SURGERY (CARDIOTHORACIC VASCULAR SURGERY)

## 2022-08-19 PROCEDURE — 85049 AUTOMATED PLATELET COUNT: CPT

## 2022-08-19 PROCEDURE — 0BH18EZ INSERTION OF ENDOTRACHEAL AIRWAY INTO TRACHEA, VIA NATURAL OR ARTIFICIAL OPENING ENDOSCOPIC: ICD-10-PCS | Performed by: THORACIC SURGERY (CARDIOTHORACIC VASCULAR SURGERY)

## 2022-08-19 PROCEDURE — 84295 ASSAY OF SERUM SODIUM: CPT

## 2022-08-19 PROCEDURE — 33518 CABG ARTERY-VEIN TWO: CPT | Performed by: PHYSICIAN ASSISTANT

## 2022-08-19 PROCEDURE — 86850 RBC ANTIBODY SCREEN: CPT

## 2022-08-19 PROCEDURE — 05HM33Z INSERTION OF INFUSION DEVICE INTO RIGHT INTERNAL JUGULAR VEIN, PERCUTANEOUS APPROACH: ICD-10-PCS | Performed by: STUDENT IN AN ORGANIZED HEALTH CARE EDUCATION/TRAINING PROGRAM

## 2022-08-19 PROCEDURE — 86923 COMPATIBILITY TEST ELECTRIC: CPT

## 2022-08-19 PROCEDURE — 71045 X-RAY EXAM CHEST 1 VIEW: CPT

## 2022-08-19 PROCEDURE — 6360000002 HC RX W HCPCS: Performed by: THORACIC SURGERY (CARDIOTHORACIC VASCULAR SURGERY)

## 2022-08-19 PROCEDURE — 02100Z9 BYPASS CORONARY ARTERY, ONE ARTERY FROM LEFT INTERNAL MAMMARY, OPEN APPROACH: ICD-10-PCS | Performed by: THORACIC SURGERY (CARDIOTHORACIC VASCULAR SURGERY)

## 2022-08-19 PROCEDURE — 82948 REAGENT STRIP/BLOOD GLUCOSE: CPT

## 2022-08-19 PROCEDURE — 2500000003 HC RX 250 WO HCPCS: Performed by: STUDENT IN AN ORGANIZED HEALTH CARE EDUCATION/TRAINING PROGRAM

## 2022-08-19 PROCEDURE — 2720000010 HC SURG SUPPLY STERILE: Performed by: THORACIC SURGERY (CARDIOTHORACIC VASCULAR SURGERY)

## 2022-08-19 PROCEDURE — 93010 ELECTROCARDIOGRAM REPORT: CPT | Performed by: INTERNAL MEDICINE

## 2022-08-19 PROCEDURE — 6360000002 HC RX W HCPCS

## 2022-08-19 PROCEDURE — 2709999900 HC NON-CHARGEABLE SUPPLY: Performed by: THORACIC SURGERY (CARDIOTHORACIC VASCULAR SURGERY)

## 2022-08-19 PROCEDURE — 2000000000 HC ICU R&B

## 2022-08-19 PROCEDURE — 06BP4ZZ EXCISION OF RIGHT SAPHENOUS VEIN, PERCUTANEOUS ENDOSCOPIC APPROACH: ICD-10-PCS | Performed by: THORACIC SURGERY (CARDIOTHORACIC VASCULAR SURGERY)

## 2022-08-19 PROCEDURE — 3700000000 HC ANESTHESIA ATTENDED CARE: Performed by: THORACIC SURGERY (CARDIOTHORACIC VASCULAR SURGERY)

## 2022-08-19 PROCEDURE — P9016 RBC LEUKOCYTES REDUCED: HCPCS

## 2022-08-19 PROCEDURE — 6370000000 HC RX 637 (ALT 250 FOR IP): Performed by: PHYSICIAN ASSISTANT

## 2022-08-19 PROCEDURE — 3600000008 HC SURGERY OHS BASE: Performed by: THORACIC SURGERY (CARDIOTHORACIC VASCULAR SURGERY)

## 2022-08-19 PROCEDURE — 85610 PROTHROMBIN TIME: CPT

## 2022-08-19 PROCEDURE — P9041 ALBUMIN (HUMAN),5%, 50ML: HCPCS | Performed by: THORACIC SURGERY (CARDIOTHORACIC VASCULAR SURGERY)

## 2022-08-19 PROCEDURE — 37799 UNLISTED PX VASCULAR SURGERY: CPT

## 2022-08-19 PROCEDURE — 021109W BYPASS CORONARY ARTERY, TWO ARTERIES FROM AORTA WITH AUTOLOGOUS VENOUS TISSUE, OPEN APPROACH: ICD-10-PCS | Performed by: THORACIC SURGERY (CARDIOTHORACIC VASCULAR SURGERY)

## 2022-08-19 PROCEDURE — 2580000003 HC RX 258: Performed by: STUDENT IN AN ORGANIZED HEALTH CARE EDUCATION/TRAINING PROGRAM

## 2022-08-19 PROCEDURE — P9047 ALBUMIN (HUMAN), 25%, 50ML: HCPCS

## 2022-08-19 PROCEDURE — 94002 VENT MGMT INPAT INIT DAY: CPT

## 2022-08-19 RX ORDER — OXYCODONE HYDROCHLORIDE 5 MG/1
5 TABLET ORAL EVERY 4 HOURS PRN
Status: DISCONTINUED | OUTPATIENT
Start: 2022-08-19 | End: 2022-08-24 | Stop reason: HOSPADM

## 2022-08-19 RX ORDER — ENALAPRILAT 2.5 MG/2ML
0.62 INJECTION INTRAVENOUS
Status: DISCONTINUED | OUTPATIENT
Start: 2022-08-19 | End: 2022-08-20

## 2022-08-19 RX ORDER — ROCURONIUM BROMIDE 10 MG/ML
INJECTION, SOLUTION INTRAVENOUS PRN
Status: DISCONTINUED | OUTPATIENT
Start: 2022-08-19 | End: 2022-08-19 | Stop reason: SDUPTHER

## 2022-08-19 RX ORDER — SODIUM CHLORIDE 0.9 % (FLUSH) 0.9 %
5-40 SYRINGE (ML) INJECTION EVERY 12 HOURS SCHEDULED
Status: DISCONTINUED | OUTPATIENT
Start: 2022-08-19 | End: 2022-08-24 | Stop reason: HOSPADM

## 2022-08-19 RX ORDER — AMIODARONE HYDROCHLORIDE 200 MG/1
200 TABLET ORAL ONCE
Status: COMPLETED | OUTPATIENT
Start: 2022-08-19 | End: 2022-08-19

## 2022-08-19 RX ORDER — PAPAVERINE HYDROCHLORIDE 30 MG/ML
INJECTION INTRAMUSCULAR; INTRAVENOUS PRN
Status: DISCONTINUED | OUTPATIENT
Start: 2022-08-19 | End: 2022-08-19 | Stop reason: HOSPADM

## 2022-08-19 RX ORDER — MULTIVITAMIN WITH IRON
1 TABLET ORAL
Status: DISCONTINUED | OUTPATIENT
Start: 2022-08-20 | End: 2022-08-24 | Stop reason: HOSPADM

## 2022-08-19 RX ORDER — MORPHINE SULFATE 2 MG/ML
2 INJECTION, SOLUTION INTRAMUSCULAR; INTRAVENOUS
Status: DISCONTINUED | OUTPATIENT
Start: 2022-08-19 | End: 2022-08-20

## 2022-08-19 RX ORDER — SODIUM CHLORIDE 9 MG/ML
INJECTION, SOLUTION INTRAVENOUS PRN
Status: DISCONTINUED | OUTPATIENT
Start: 2022-08-19 | End: 2022-08-19 | Stop reason: HOSPADM

## 2022-08-19 RX ORDER — OXYCODONE HYDROCHLORIDE 5 MG/1
10 TABLET ORAL EVERY 4 HOURS PRN
Status: DISCONTINUED | OUTPATIENT
Start: 2022-08-19 | End: 2022-08-24 | Stop reason: HOSPADM

## 2022-08-19 RX ORDER — CHLORHEXIDINE GLUCONATE 4 G/100ML
SOLUTION TOPICAL ONCE
Status: COMPLETED | OUTPATIENT
Start: 2022-08-19 | End: 2022-08-19

## 2022-08-19 RX ORDER — ATORVASTATIN CALCIUM 40 MG/1
40 TABLET, FILM COATED ORAL NIGHTLY
Status: DISCONTINUED | OUTPATIENT
Start: 2022-08-20 | End: 2022-08-24 | Stop reason: HOSPADM

## 2022-08-19 RX ORDER — POTASSIUM CHLORIDE 29.8 MG/ML
20 INJECTION INTRAVENOUS PRN
Status: DISCONTINUED | OUTPATIENT
Start: 2022-08-19 | End: 2022-08-20

## 2022-08-19 RX ORDER — FENTANYL CITRATE 50 UG/ML
INJECTION, SOLUTION INTRAMUSCULAR; INTRAVENOUS PRN
Status: DISCONTINUED | OUTPATIENT
Start: 2022-08-19 | End: 2022-08-19 | Stop reason: SDUPTHER

## 2022-08-19 RX ORDER — SODIUM CHLORIDE 0.9 % (FLUSH) 0.9 %
5-40 SYRINGE (ML) INJECTION PRN
Status: DISCONTINUED | OUTPATIENT
Start: 2022-08-19 | End: 2022-08-19 | Stop reason: HOSPADM

## 2022-08-19 RX ORDER — METOPROLOL TARTRATE 5 MG/5ML
2.5 INJECTION INTRAVENOUS EVERY 10 MIN PRN
Status: DISCONTINUED | OUTPATIENT
Start: 2022-08-19 | End: 2022-08-20

## 2022-08-19 RX ORDER — FAMOTIDINE 20 MG/1
20 TABLET, FILM COATED ORAL NIGHTLY
Status: DISCONTINUED | OUTPATIENT
Start: 2022-08-19 | End: 2022-08-20

## 2022-08-19 RX ORDER — SODIUM CHLORIDE 0.9 % (FLUSH) 0.9 %
5-40 SYRINGE (ML) INJECTION EVERY 12 HOURS SCHEDULED
Status: DISCONTINUED | OUTPATIENT
Start: 2022-08-19 | End: 2022-08-19 | Stop reason: HOSPADM

## 2022-08-19 RX ORDER — PROPOFOL 10 MG/ML
INJECTION, EMULSION INTRAVENOUS PRN
Status: DISCONTINUED | OUTPATIENT
Start: 2022-08-19 | End: 2022-08-19 | Stop reason: SDUPTHER

## 2022-08-19 RX ORDER — ONDANSETRON 2 MG/ML
4 INJECTION INTRAMUSCULAR; INTRAVENOUS EVERY 6 HOURS PRN
Status: DISCONTINUED | OUTPATIENT
Start: 2022-08-19 | End: 2022-08-24 | Stop reason: HOSPADM

## 2022-08-19 RX ORDER — SENNOSIDES 8.8 MG/5ML
10 LIQUID ORAL DAILY PRN
Status: DISCONTINUED | OUTPATIENT
Start: 2022-08-19 | End: 2022-08-24 | Stop reason: HOSPADM

## 2022-08-19 RX ORDER — ALBUMIN, HUMAN INJ 5% 5 %
SOLUTION INTRAVENOUS PRN
Status: DISCONTINUED | OUTPATIENT
Start: 2022-08-19 | End: 2022-08-19 | Stop reason: SDUPTHER

## 2022-08-19 RX ORDER — SODIUM CHLORIDE 0.9 % (FLUSH) 0.9 %
5-40 SYRINGE (ML) INJECTION PRN
Status: DISCONTINUED | OUTPATIENT
Start: 2022-08-19 | End: 2022-08-24 | Stop reason: HOSPADM

## 2022-08-19 RX ORDER — ALBUMIN, HUMAN INJ 5% 5 %
25 SOLUTION INTRAVENOUS PRN
Status: DISCONTINUED | OUTPATIENT
Start: 2022-08-19 | End: 2022-08-20

## 2022-08-19 RX ORDER — CALCIUM CHLORIDE 100 MG/ML
INJECTION INTRAVENOUS; INTRAVENTRICULAR PRN
Status: DISCONTINUED | OUTPATIENT
Start: 2022-08-19 | End: 2022-08-19 | Stop reason: SDUPTHER

## 2022-08-19 RX ORDER — AMINOCAPROIC ACID 250 MG/ML
INJECTION, SOLUTION INTRAVENOUS PRN
Status: DISCONTINUED | OUTPATIENT
Start: 2022-08-19 | End: 2022-08-19 | Stop reason: SDUPTHER

## 2022-08-19 RX ORDER — MAGNESIUM SULFATE IN WATER 40 MG/ML
2000 INJECTION, SOLUTION INTRAVENOUS PRN
Status: DISCONTINUED | OUTPATIENT
Start: 2022-08-19 | End: 2022-08-20

## 2022-08-19 RX ORDER — VASOPRESSIN 20 U/ML
INJECTION PARENTERAL PRN
Status: DISCONTINUED | OUTPATIENT
Start: 2022-08-19 | End: 2022-08-19 | Stop reason: SDUPTHER

## 2022-08-19 RX ORDER — CHLORHEXIDINE GLUCONATE 0.12 MG/ML
15 RINSE ORAL ONCE
Status: COMPLETED | OUTPATIENT
Start: 2022-08-19 | End: 2022-08-19

## 2022-08-19 RX ORDER — AMIODARONE HYDROCHLORIDE 200 MG/1
200 TABLET ORAL 2 TIMES DAILY
Status: DISCONTINUED | OUTPATIENT
Start: 2022-08-19 | End: 2022-08-23

## 2022-08-19 RX ORDER — EPINEPHRINE 1 MG/ML
INJECTION, SOLUTION, CONCENTRATE INTRAVENOUS PRN
Status: DISCONTINUED | OUTPATIENT
Start: 2022-08-19 | End: 2022-08-19 | Stop reason: SDUPTHER

## 2022-08-19 RX ORDER — ONDANSETRON 4 MG/1
4 TABLET, ORALLY DISINTEGRATING ORAL EVERY 8 HOURS PRN
Status: DISCONTINUED | OUTPATIENT
Start: 2022-08-19 | End: 2022-08-24 | Stop reason: HOSPADM

## 2022-08-19 RX ORDER — SODIUM CHLORIDE 9 MG/ML
INJECTION, SOLUTION INTRAVENOUS CONTINUOUS
Status: DISCONTINUED | OUTPATIENT
Start: 2022-08-19 | End: 2022-08-20

## 2022-08-19 RX ORDER — HEPARIN SODIUM 1000 [USP'U]/ML
INJECTION, SOLUTION INTRAVENOUS; SUBCUTANEOUS PRN
Status: DISCONTINUED | OUTPATIENT
Start: 2022-08-19 | End: 2022-08-19 | Stop reason: SDUPTHER

## 2022-08-19 RX ORDER — PROTAMINE SULFATE 10 MG/ML
50 INJECTION, SOLUTION INTRAVENOUS
Status: DISPENSED | OUTPATIENT
Start: 2022-08-19 | End: 2022-08-19

## 2022-08-19 RX ORDER — FAMOTIDINE 10 MG/ML
20 INJECTION, SOLUTION INTRAVENOUS NIGHTLY
Status: DISCONTINUED | OUTPATIENT
Start: 2022-08-19 | End: 2022-08-20

## 2022-08-19 RX ORDER — SENNA AND DOCUSATE SODIUM 50; 8.6 MG/1; MG/1
1 TABLET, FILM COATED ORAL 2 TIMES DAILY
Status: DISCONTINUED | OUTPATIENT
Start: 2022-08-19 | End: 2022-08-24 | Stop reason: HOSPADM

## 2022-08-19 RX ORDER — ATORVASTATIN CALCIUM 20 MG/1
20 TABLET, FILM COATED ORAL NIGHTLY
Status: DISCONTINUED | OUTPATIENT
Start: 2022-08-20 | End: 2022-08-19

## 2022-08-19 RX ORDER — PHENYLEPHRINE HYDROCHLORIDE 10 MG/ML
INJECTION INTRAVENOUS PRN
Status: DISCONTINUED | OUTPATIENT
Start: 2022-08-19 | End: 2022-08-19 | Stop reason: SDUPTHER

## 2022-08-19 RX ORDER — ACETAMINOPHEN 325 MG/1
650 TABLET ORAL EVERY 4 HOURS PRN
Status: DISCONTINUED | OUTPATIENT
Start: 2022-08-19 | End: 2022-08-24 | Stop reason: HOSPADM

## 2022-08-19 RX ORDER — MIDAZOLAM HYDROCHLORIDE 1 MG/ML
INJECTION INTRAMUSCULAR; INTRAVENOUS PRN
Status: DISCONTINUED | OUTPATIENT
Start: 2022-08-19 | End: 2022-08-19 | Stop reason: SDUPTHER

## 2022-08-19 RX ORDER — DEXAMETHASONE SODIUM PHOSPHATE 10 MG/ML
INJECTION, EMULSION INTRAMUSCULAR; INTRAVENOUS PRN
Status: DISCONTINUED | OUTPATIENT
Start: 2022-08-19 | End: 2022-08-19 | Stop reason: SDUPTHER

## 2022-08-19 RX ORDER — ENOXAPARIN SODIUM 100 MG/ML
40 INJECTION SUBCUTANEOUS DAILY
Status: DISCONTINUED | OUTPATIENT
Start: 2022-08-20 | End: 2022-08-24 | Stop reason: HOSPADM

## 2022-08-19 RX ORDER — SODIUM CHLORIDE 9 MG/ML
INJECTION, SOLUTION INTRAVENOUS PRN
Status: DISCONTINUED | OUTPATIENT
Start: 2022-08-19 | End: 2022-08-20

## 2022-08-19 RX ORDER — PROTAMINE SULFATE 10 MG/ML
INJECTION, SOLUTION INTRAVENOUS PRN
Status: DISCONTINUED | OUTPATIENT
Start: 2022-08-19 | End: 2022-08-19 | Stop reason: SDUPTHER

## 2022-08-19 RX ORDER — DEXTROSE MONOHYDRATE 100 MG/ML
INJECTION, SOLUTION INTRAVENOUS CONTINUOUS PRN
Status: DISCONTINUED | OUTPATIENT
Start: 2022-08-19 | End: 2022-08-20

## 2022-08-19 RX ORDER — SUCCINYLCHOLINE/SOD CL,ISO/PF 200MG/10ML
SYRINGE (ML) INTRAVENOUS PRN
Status: DISCONTINUED | OUTPATIENT
Start: 2022-08-19 | End: 2022-08-19 | Stop reason: SDUPTHER

## 2022-08-19 RX ORDER — ALBUTEROL SULFATE 90 UG/1
2 AEROSOL, METERED RESPIRATORY (INHALATION) EVERY 6 HOURS PRN
Status: DISCONTINUED | OUTPATIENT
Start: 2022-08-19 | End: 2022-08-24 | Stop reason: HOSPADM

## 2022-08-19 RX ORDER — SODIUM CHLORIDE, SODIUM GLUCONATE, SODIUM ACETATE, POTASSIUM CHLORIDE AND MAGNESIUM CHLORIDE 526; 502; 368; 37; 30 MG/100ML; MG/100ML; MG/100ML; MG/100ML; MG/100ML
INJECTION, SOLUTION INTRAVENOUS CONTINUOUS PRN
Status: DISCONTINUED | OUTPATIENT
Start: 2022-08-19 | End: 2022-08-19 | Stop reason: SDUPTHER

## 2022-08-19 RX ADMIN — PHENYLEPHRINE HYDROCHLORIDE 20 MCG/MIN: 10 INJECTION INTRAVENOUS at 12:35

## 2022-08-19 RX ADMIN — PHENYLEPHRINE HYDROCHLORIDE 200 MCG: 10 INJECTION INTRAVENOUS at 08:01

## 2022-08-19 RX ADMIN — ALBUMIN (HUMAN) 12.5 G: 12.5 SOLUTION INTRAVENOUS at 12:53

## 2022-08-19 RX ADMIN — FENTANYL CITRATE 50 MCG: 50 INJECTION INTRAMUSCULAR; INTRAVENOUS at 12:57

## 2022-08-19 RX ADMIN — ROCURONIUM BROMIDE 5 MG: 10 INJECTION, SOLUTION INTRAVENOUS at 07:40

## 2022-08-19 RX ADMIN — CALCIUM CHLORIDE 1000 MG: 100 INJECTION, SOLUTION INTRAVENOUS at 15:06

## 2022-08-19 RX ADMIN — MIDAZOLAM 2 MG: 1 INJECTION INTRAMUSCULAR; INTRAVENOUS at 07:40

## 2022-08-19 RX ADMIN — METOPROLOL TARTRATE 25 MG: 25 TABLET, FILM COATED ORAL at 21:04

## 2022-08-19 RX ADMIN — SODIUM CHLORIDE, PRESERVATIVE FREE 10 ML: 5 INJECTION INTRAVENOUS at 19:53

## 2022-08-19 RX ADMIN — SODIUM CHLORIDE: 9 INJECTION, SOLUTION INTRAVENOUS at 14:56

## 2022-08-19 RX ADMIN — ALBUMIN (HUMAN) 12.5 G: 12.5 SOLUTION INTRAVENOUS at 12:42

## 2022-08-19 RX ADMIN — AMINOCAPROIC ACID 1500 MG: 250 INJECTION, SOLUTION INTRAVENOUS at 12:22

## 2022-08-19 RX ADMIN — Medication 120 MG: at 07:41

## 2022-08-19 RX ADMIN — PHENYLEPHRINE HYDROCHLORIDE 200 MCG: 10 INJECTION INTRAVENOUS at 08:53

## 2022-08-19 RX ADMIN — AMINOCAPROIC ACID 1 G/HR: 250 INJECTION, SOLUTION INTRAVENOUS at 13:06

## 2022-08-19 RX ADMIN — HEPARIN SODIUM 27000 UNITS: 1000 INJECTION, SOLUTION INTRAVENOUS; SUBCUTANEOUS at 09:11

## 2022-08-19 RX ADMIN — MORPHINE SULFATE 2 MG: 2 INJECTION, SOLUTION INTRAMUSCULAR; INTRAVENOUS at 13:54

## 2022-08-19 RX ADMIN — CHLORHEXIDINE GLUCONATE: 4 SOLUTION TOPICAL at 07:10

## 2022-08-19 RX ADMIN — METOPROLOL TARTRATE 25 MG: 25 TABLET, FILM COATED ORAL at 07:11

## 2022-08-19 RX ADMIN — ALBUMIN (HUMAN) 25 G: 12.5 INJECTION, SOLUTION INTRAVENOUS at 14:03

## 2022-08-19 RX ADMIN — MIDAZOLAM 3 MG: 1 INJECTION INTRAMUSCULAR; INTRAVENOUS at 09:54

## 2022-08-19 RX ADMIN — 0.12% CHLORHEXIDINE GLUCONATE 15 ML: 1.2 RINSE ORAL at 07:11

## 2022-08-19 RX ADMIN — CEFAZOLIN 2000 MG: 10 INJECTION, POWDER, FOR SOLUTION INTRAVENOUS at 23:08

## 2022-08-19 RX ADMIN — AMIODARONE HYDROCHLORIDE 200 MG: 200 TABLET ORAL at 07:10

## 2022-08-19 RX ADMIN — PROPOFOL 50 MG: 10 INJECTION, EMULSION INTRAVENOUS at 07:56

## 2022-08-19 RX ADMIN — SODIUM CHLORIDE 6.7 UNITS/HR: 9 INJECTION, SOLUTION INTRAVENOUS at 18:21

## 2022-08-19 RX ADMIN — PROPOFOL 150 MG: 10 INJECTION, EMULSION INTRAVENOUS at 07:40

## 2022-08-19 RX ADMIN — EPINEPHRINE 10 MCG: 1 INJECTION, SOLUTION, CONCENTRATE INTRAVENOUS at 08:13

## 2022-08-19 RX ADMIN — AMIODARONE HYDROCHLORIDE 200 MG: 200 TABLET ORAL at 21:04

## 2022-08-19 RX ADMIN — Medication 10 UNITS: at 11:16

## 2022-08-19 RX ADMIN — PHENYLEPHRINE HYDROCHLORIDE 200 MCG: 10 INJECTION INTRAVENOUS at 08:26

## 2022-08-19 RX ADMIN — PROTAMINE SULFATE 180 MG: 10 INJECTION, SOLUTION INTRAVENOUS at 12:17

## 2022-08-19 RX ADMIN — CEFAZOLIN 2000 MG: 10 INJECTION, POWDER, FOR SOLUTION INTRAVENOUS at 15:00

## 2022-08-19 RX ADMIN — POTASSIUM CHLORIDE 20 MEQ: 29.8 INJECTION, SOLUTION INTRAVENOUS at 15:41

## 2022-08-19 RX ADMIN — MORPHINE SULFATE 2 MG: 2 INJECTION, SOLUTION INTRAMUSCULAR; INTRAVENOUS at 21:34

## 2022-08-19 RX ADMIN — VASOPRESSIN 1 UNITS: 20 INJECTION INTRAVENOUS at 12:41

## 2022-08-19 RX ADMIN — PHENYLEPHRINE HYDROCHLORIDE 200 MCG: 10 INJECTION INTRAVENOUS at 08:13

## 2022-08-19 RX ADMIN — SODIUM CHLORIDE, SODIUM GLUCONATE, SODIUM ACETATE, POTASSIUM CHLORIDE AND MAGNESIUM CHLORIDE: 526; 502; 368; 37; 30 INJECTION, SOLUTION INTRAVENOUS at 07:35

## 2022-08-19 RX ADMIN — POTASSIUM CHLORIDE 20 MEQ: 29.8 INJECTION, SOLUTION INTRAVENOUS at 14:54

## 2022-08-19 RX ADMIN — EPINEPHRINE 5 MCG/MIN: 1 INJECTION INTRAMUSCULAR; INTRAVENOUS; SUBCUTANEOUS at 12:03

## 2022-08-19 RX ADMIN — OXYCODONE 10 MG: 5 TABLET ORAL at 17:28

## 2022-08-19 RX ADMIN — Medication 10 UNITS: at 12:52

## 2022-08-19 RX ADMIN — FENTANYL CITRATE 100 MCG: 50 INJECTION INTRAMUSCULAR; INTRAVENOUS at 08:41

## 2022-08-19 RX ADMIN — HEPARIN SODIUM 5000 UNITS: 1000 INJECTION, SOLUTION INTRAVENOUS; SUBCUTANEOUS at 08:38

## 2022-08-19 RX ADMIN — FENTANYL CITRATE 50 MCG: 50 INJECTION INTRAMUSCULAR; INTRAVENOUS at 08:45

## 2022-08-19 RX ADMIN — PHENYLEPHRINE HYDROCHLORIDE 100 MCG: 10 INJECTION INTRAVENOUS at 12:04

## 2022-08-19 RX ADMIN — VASOPRESSIN 1 UNITS: 20 INJECTION INTRAVENOUS at 12:22

## 2022-08-19 RX ADMIN — SODIUM BICARBONATE 50 MEQ: 84 INJECTION, SOLUTION INTRAVENOUS at 14:06

## 2022-08-19 RX ADMIN — CALCIUM CHLORIDE 1 G: 100 INJECTION, SOLUTION INTRAVENOUS at 09:25

## 2022-08-19 RX ADMIN — PHENYLEPHRINE HYDROCHLORIDE 200 MCG: 10 INJECTION INTRAVENOUS at 07:54

## 2022-08-19 RX ADMIN — EPINEPHRINE 20 MCG: 1 INJECTION, SOLUTION, CONCENTRATE INTRAVENOUS at 12:03

## 2022-08-19 RX ADMIN — PHENYLEPHRINE HYDROCHLORIDE 200 MCG: 10 INJECTION INTRAVENOUS at 08:34

## 2022-08-19 RX ADMIN — FAMOTIDINE 20 MG: 10 INJECTION, SOLUTION INTRAVENOUS at 20:10

## 2022-08-19 RX ADMIN — DEXAMETHASONE SODIUM PHOSPHATE 5 MG: 10 INJECTION, EMULSION INTRAMUSCULAR; INTRAVENOUS at 09:16

## 2022-08-19 RX ADMIN — Medication 10 UNITS: at 10:15

## 2022-08-19 RX ADMIN — MORPHINE SULFATE 2 MG: 2 INJECTION, SOLUTION INTRAMUSCULAR; INTRAVENOUS at 23:04

## 2022-08-19 RX ADMIN — Medication 100 MG: at 07:40

## 2022-08-19 RX ADMIN — ROCURONIUM BROMIDE 45 MG: 10 INJECTION, SOLUTION INTRAVENOUS at 08:00

## 2022-08-19 RX ADMIN — FENTANYL CITRATE 50 MCG: 50 INJECTION INTRAMUSCULAR; INTRAVENOUS at 07:40

## 2022-08-19 ASSESSMENT — PAIN - FUNCTIONAL ASSESSMENT: PAIN_FUNCTIONAL_ASSESSMENT: 0-10

## 2022-08-19 ASSESSMENT — PULMONARY FUNCTION TESTS
PIF_VALUE: 19
PIF_VALUE: 24

## 2022-08-19 NOTE — ANESTHESIA PRE PROCEDURE
Department of Anesthesiology  Preprocedure Note       Name:  Dania Cushing   Age:  58 y.o.  :  1960                                          MRN:  [id number]         Date:  2022      Surgeon: Vanessa Suarez):  Kesha Dodge MD    Procedure: Procedure(s):  CABG/JODY    Medications prior to admission:   Prior to Admission medications    Medication Sig Start Date End Date Taking? Authorizing Provider   atorvastatin (LIPITOR) 40 MG tablet Take 1 tablet by mouth in the morning. 8/10/22 12/8/22  Frucasandra Weiss MD   aspirin EC 81 MG EC tablet Take 1 tablet by mouth in the morning. 8/10/22   Magda Wesis MD   Dulaglutide (TRULICITY) 2.66 ZG/0.1UZ SOPN Inject 0.75 mg into the skin once a week On Tuesday    Historical Provider, MD   losartan (COZAAR) 50 MG tablet Take 50 mg by mouth daily    Historical Provider, MD   HYDROcodone-acetaminophen (NORCO) 5-325 MG per tablet Take 1 tablet by mouth every 8 hours as needed for Pain  As needed for pain. 16   Annmarie Mandujano MD   cyclobenzaprine (FLEXERIL) 10 MG tablet TAKE 1 TABLET BY MOUTH EVERY EVENING 10/17/16   Annmarie Mandujano MD   Insulin Pen Needle (B-D ULTRAFINE III SHORT PEN) 31G X 8 MM MISC 1 each by Does not apply route daily 10/17/16   Annmarie Mandujano MD   meloxicam [facility]) 15 MG tablet Take 1 tablet by mouth daily 10/17/16 8/10/22  Annmarie Mandujano MD   metFORMIN (GLUCOPHAGE) 1000 MG tablet Take 1 tablet by mouth 2 times daily (with meals) 16   Annmarie Mandujano MD   glucose blood VI test strips (FREESTYLE LITE) strip Apply 1 each topically three times daily 16   Annmarie Mandujano MD   insulin glargine (LANTUS SOLOSTAR) 100 UNIT/ML injection pen Inject 10 Units into the skin nightly  Patient taking differently: Inject 50 Units into the skin nightly 16   Annmarie Mandujano MD   FREESTYLE LANCETS MISC Apply 1 each topically 3 times daily.  2/3/14   Antonieta Goldberg Tres Ambrocio MD   glucose monitoring kit (FREESTYLE) monitoring kit Use as directed 2/3/14   Shobha Rojas MD       Current medications:    Current Facility-Administered Medications   Medication Dose Route Frequency Provider Last Rate Last Admin    sodium chloride flush 0.9 % injection 5-40 mL  5-40 mL IntraVENous 2 times per day JANY Blackwell-NOELLE        sodium chloride flush 0.9 % injection 5-40 mL  5-40 mL IntraVENous PRN JANY Blackwell-C        0.9 % sodium chloride infusion   IntraVENous PRN Yazmin Wang PA-C        ceFAZolin (ANCEF) 2000 mg in dextrose 5 % 50 mL IVPB  2,000 mg [facility]ous [facility]        amiodarone (CORDARONE) tablet 200 mg  200 mg Oral Once Yazmin Wang PA-C        chlorhexidine (PERIDEX) 0.12 % solution 15 mL  15 mL Mouth/Throat Once Yazmin Wang PA-C        chlorhexidine (HIBICLENS) 4 % liquid   Topical Once Yazmin Wang PA-C        insulin (HumuLIN R) 100 units in sodium chloride 0.9% 100 mL infusion  1 Units/hr IntraVENous Continuous Yazmin Wang PA-C        metoprolol tartrate (LOPRESSOR) tablet 25 mg  25 mg Oral Once Yazmin Wang PA-C           Allergies:     Allergies   Allergen Reactions    Tramadol Nausea And Vomiting       Problem List:    Patient Active Problem List   Diagnosis Code    GERD (gastroesophageal reflux disease) K21.9    Acquired spondylolisthesis M43.10    Spinal stenosis, lumbar region, with neurogenic claudication M48.062    Tobacco abuse Z72.0    Arthralgia of right hand M25.541    HTN (hypertension) I10    Type 1 diabetes mellitus ([facility] [address] E10.9    Insomnia G47.00    Hyperlipidemia E78.5    Onychomycosis of toenail B35.1    Preoperative clearance Z01.818    CAD in native artery I25.10    Abnormal stress test R94.39    Other fatigue R53.83       Past Medical History:        Diagnosis Date    CAD in native artery 8/10/2022    DDD (degenerative disc disease)     Frozen shoulder     Left with arthritis    GERD (gastroesophageal reflux disease)     HTN (hypertension) 10/31/2013    Hyperlipidemia 2/3/2014    Lumbar radiculopathy     Obesity     Osteoarthritis     Tobacco abuse     Type II or unspecified type diabetes mellitus without mention of complication, not stated as uncontrolled        Past Surgical History:        Procedure Laterality Date     SECTION      x3    SHOULDER SURGERY      left and right    TUBAL LIGATION         Social History:    Social History     Tobacco Use    Smoking status: Every Day     Packs/day: 1.00     Years: 30.00     Pack years: 30.00     Types: Cigarettes    Smokeless tobacco: Never   Substance Use Topics    Alcohol use: No     Alcohol/week: 0.0 standard drinks                                Ready to quit: Not Answered  Counseling given: Not Answered      Vital Signs (Current):   Vitals:    22 0605   BP: 136/66   Pulse: 84   Resp: 16   Temp: (!) 96.2 °F (35.7 °C)   TempSrc: Temporal   SpO2: 95%                                              BP Readings from Last 3 Encounters:   22 136/66   22 128/75   08/10/22 112/63       NPO Status:                                                                                 BMI:   Wt Readings from Last 3 Encounters:   22 167 lb 12.3 oz (76.1 kg)   08/10/22 168 lb (76.2 kg)   22 164 lb (74.4 kg)     There is no height or weight on file to calculate BMI.    CBC:   Lab Results   Component Value Date/Time    WBC 10.4 2022 08:45 AM    RBC 4.36 2022 08:45 AM    HGB 12.6 2022 08:45 AM    HCT 40.1 2022 08:45 AM    MCV 92.0 2022 08:45 AM    RDW 13.0 2022 12:52 PM     2022 08:45 AM       CMP:   Lab Results   Component Value Date/Time     08/10/2022 05:47 AM    K 4.4 08/10/2022 05:47 AM     08/10/2022 05:47 AM    CO2 20 08/10/2022 05:47 AM    BUN 39 08/10/2022 05:47 AM    CREATININE 1.3 08/10/2022 05:47 AM    LABGLOM 41 08/10/2022 05:47 AM    GLUCOSE 93 08/10/2022 05:47 AM GLUCOSE 157 06/06/2022 12:52 PM    PROT 8.0 07/02/2014 11:00 AM    CALCIUM 9.4 08/10/2022 05:47 AM    BILITOT 0.2 07/02/2014 11:00 AM    ALKPHOS 92 07/02/2014 11:00 AM    AST 23 07/02/2014 11:00 AM    ALT 26 07/02/2014 11:00 AM       POC Tests: No results for input(s): POCGLU, POCNA, POCK, POCCL, POCBUN, POCHEMO, POCHCT in the last 72 hours. Coags:   Lab Results   Component Value Date/Time    INR 1.13 08/18/2022 08:45 AM    APTT 31.8 08/10/2022 05:47 AM       HCG (If Applicable): No results found for: PREGTESTUR, PREGSERUM, HCG, HCGQUANT     ABGs: No results found for: PHART, PO2ART, ISN7EBX, TJX9BNX, BEART, D9CSYSHR     Type & Screen (If Applicable):  Lab Results   Component Value Date    LABRH POS 08/10/2022       Drug/Infectious Status (If Applicable):  No results found for: HIV, HEPCAB    COVID-19 Screening (If Applicable): No results found for: COVID19        Anesthesia Evaluation  Patient summary reviewed no history of anesthetic complications:   Airway: Mallampati: II          Dental:          Pulmonary:normal exam        (-) COPD and asthma                           Cardiovascular:    (+) hypertension:, CAD:,                   Neuro/Psych:      (-) seizures and CVA           GI/Hepatic/Renal:   (+) GERD: well controlled,      (-) liver disease and no renal disease       Endo/Other:    (+) DiabetesType II DM, , .    (-) hypothyroidism, hyperthyroidism               Abdominal:   (+) obese,           Vascular:     - DVT and PE. Other Findings:           Anesthesia Plan      general     ASA 4     (GETA. PIVx2. Additional access can be obtained after induction if needed. Standard ASA monitors. IV/PO opioids and other adjuncts as needed for pain control. PACU post op for recovery.     Possible anesthetics complications were discussed with the patient, including but not limited to: PONV, damage to the airway and surrounding structures (teeth, lips, gums, tongue, etc.), adverse reactions to medicine, cardiac complications (MI, CHF, arrhythmias, etc.), respiratory complications (post-op ventilation, respiratory failure, etc.), neurologic complications (nerve damage, stroke, seizure), and death. The patient was given the opportunity to ask questions and all questions were answered to the patient's satisfaction. The patient is in agreement with the anesthetic plan.  )  Induction: intravenous. arterial line, PA catheter, BIS, JODY, central line, continuous noninvasive hemodynamic monitor and CVP    Anesthetic plan and risks discussed with patient. Use of blood products discussed with patient whom consented to blood products.                      Mira Renteria DO   8/19/2022

## 2022-08-19 NOTE — LETTER
[facility]  [address] [facility] [address]  Phone: [phone]             August 21, 2022    Patient: Liane SIBLEY   YOB: 1960   Date of Visit: 8/19/2022       To Whom It May Concern:    LACEY DAYSI TOÑITO was seen and treated in our facility [facility] beginning 8/19/2022. Benito Timmons may return to school on 8/24/2022.       Sincerely,       Reshma Chandler RN         Signature:__________________________________

## 2022-08-19 NOTE — LETTER
[facility]  [address] [facility] [address]  Phone: [phone]             August 21, 2022    Patient: Adrianna SIBLEY   YOB: 1960   Date of Visit: 8/19/2022       To Whom It May Concern:    LACEY Kirkpatrick Roselia SIBLEY was seen and treated in our facility [facility] beginning 8/19/2022. Juanita Olivas may  may return to work on 8/22.       Sincerely,       Brittany Martin RN         Signature:__________________________________

## 2022-08-19 NOTE — ANESTHESIA PROCEDURE NOTES
performed, anesthesia consent given, oxygen available, monitors applied/VS acknowledged, fire risk safety assessment completed and verbalized and blood product R/B/A discussed and consented

## 2022-08-19 NOTE — PROGRESS NOTES
Jose F Lee is a Czech speaking 58year old female who is getting ready to have heart surgery, (three stints put in). Her , son,  nephew sister in law and others are in the room with here. She is Anglican and I let her know we have a  to see her later. She has an  on a green machine in the room helping her understand what the doctor, nurse and this  are saying. 08/19/22 0710   Encounter Summary   Encounter Overview/Reason  Spiritual/Emotional Needs   Service Provided For: Patient and family together   Referral/Consult From: Multi-disciplinary team;Rounding   Support System Spouse; Family members; Children   Last Encounter  08/19/22   Complexity of Encounter Moderate   Begin Time 0710   End Time  0727   Total Time Calculated 17 min   Encounter    Type Pre-Procedural   Spiritual/Emotional needs   Type Spiritual Support   This  offered a listening ear, questions were asked of the medical staff, words of encouragement given and then prayer is offered and accepted. Pre surgery contact with prayer. She will be going to ICU after surgery.

## 2022-08-19 NOTE — PROGRESS NOTES
Pt admitted to [facility] room 19 and oriented to unit. SCD sleeves applied. Nares swabbed. Pt verbalized permission for first name, last initial and physicians name on white board. SDS board and discharge criteria explained, pt and family verbalized understanding. Pt denies thoughts of harming self or others. Call light in reach. Family at the bedside.

## 2022-08-19 NOTE — CONSENT
Informed Consent for Blood Component Transfusion Note    I have discussed with the patient the rationale for blood component transfusion; its benefits in treating or preventing fatigue, organ damage, or death; and its risk which includes mild transfusion reactions, rare risk of blood borne infection, or more serious but rare reactions. I have discussed the alternatives to transfusion, including the risk and consequences of not receiving transfusion. The patient had an opportunity to ask questions and had agreed to proceed with transfusion of blood components.     Electronically signed by Jana Fu MD on 8/19/22 at 7:27 AM EDT

## 2022-08-19 NOTE — H&P
Cardiovascular Surgery History and Physical    History Of Present Illness:  58 y.o. diabetic female, no previous history of CAD, originally referred for preoperative clearance for a cervical orthopedic procedure. Abnormal EKG prompted LHC, showing 3v CAD, including severe left main disease, and RCA . Patient reports only chronic progressive fatigue and dyspnea on exertion; denies chest pain, presyncope, or orthopnea. Past Medical History:       Past Medical History             Diagnosis Date    CAD in native artery 8/10/2022    DDD (degenerative disc disease)      Frozen shoulder       Left with arthritis    GERD (gastroesophageal reflux disease)      HTN (hypertension) 10/31/2013    Hyperlipidemia 2/3/2014    Lumbar radiculopathy      Obesity      Osteoarthritis      Tobacco abuse      Type II or unspecified type diabetes mellitus without mention of complication, not stated as uncontrolled              Past Surgical History:       Past Surgical History             Procedure Laterality Date     SECTION         x3    SHOULDER SURGERY         left and right            Medications Prior to Admission:      Home Medications           Prior to Admission medications   Medication Sig Start Date End Date Taking? Authorizing Provider   atorvastatin (LIPITOR) 40 MG tablet Take 1 tablet by mouth in the morning. 8/10/22 12/8/22 Yes Divine Morris MD   aspirin EC 81 MG EC tablet Take 1 tablet by mouth in the morning. 8/10/22   Yes Divine Morris MD   Dulaglutide (TRULICITY) 4.45 YL/5.6QE SOPN Inject 0.75 mg into the skin once a week On Tuesday       Historical Provider, MD   losartan (COZAAR) 50 MG tablet Take 50 mg by mouth daily       Historical Provider, MD   HYDROcodone-acetaminophen (NORCO) 5-325 MG per tablet Take 1 tablet by mouth every 8 hours as needed for Pain  As needed for pain.  16     Radha Ramirez MD   cyclobenzaprine (FLEXERIL) 10 MG tablet TAKE 1 TABLET BY MOUTH EVERY EVENING 10/17/16     Keyana Wolf MD   Insulin Pen Needle (B-D ULTRAFINE III SHORT PEN) 31G X 8 MM MISC 1 each by Does not apply route daily 10/17/16     Keyana Wolf MD   meloxicam SYED TATUM [facility]) 15 MG tablet Take 1 tablet by mouth daily 10/17/16 8/10/22   Keyana Wolf MD   metFORMIN (GLUCOPHAGE) 1000 MG tablet Take 1 tablet by mouth 2 times daily (with meals) 7/19/16     Keyana Wolf MD   glucose blood VI test strips (FREESTYLE LITE) strip Apply 1 each topically three times daily 4/20/16     Keyana Wolf MD   insulin glargine (LANTUS SOLOSTAR) 100 UNIT/ML injection pen Inject 10 Units into the skin nightly 4/20/16     Keyana Wolf MD   FREESTYLE LANCETS MISC Apply 1 each topically 3 times daily. 2/3/14     Keyana Wolf MD   glucose monitoring kit (FREESTYLE) monitoring kit Use as directed 2/3/14     Keyana Wolf MD            Allergies:  Tramadol     Social History:       TOBACCO:   reports that she has been smoking cigarettes. She has a 30.00 pack-year smoking history. She has never used smokeless tobacco.  ETOH:   reports no history of alcohol use. Social History          Substance and Sexual Activity   Drug Use No            Family History:       Family History             Problem Relation Age of Onset    Parkinson's Disease Mother      Arthritis Mother      Diabetes type 2 Father      Heart Disease Father      Hypertension Father      Heart Disease Sister      High Blood Pressure Sister      Pancreatic Cancer Brother      Diabetes Brother              REVIEW OFSYSTEMS:       Review of Systems  Constitutional:  Positive for activity change and fatigue. HENT:  Negative for dental problem. Eyes:  Negative for discharge. Respiratory:  Positive for shortness of breath. Cardiovascular:  Negative for chest pain and palpitations. Gastrointestinal:  Negative for abdominal pain.   Endocrine: Negative for cold intolerance. Genitourinary:  Negative for difficulty urinating. Musculoskeletal:  Positive for neck pain. Skin:  Negative for color change. Allergic/Immunologic: Negative for environmental allergies. Neurological:  Negative for dizziness. Hematological:  Negative for adenopathy. Psychiatric/Behavioral:  Negative for agitation. PHYSICALEXAM:     BP (!) 95/57   Pulse 81   Temp 98.1 °F (36.7 °C) (Oral)   Resp 18   Ht 5' 1\" (1.549 m)   Wt 168 lb (76.2 kg)   SpO2 95%   BMI 31.74 kg/m²      General appearance:  No apparent distress, appears stated age and cooperative. HEENT:  Normal cephalic, atraumatic withoutobvious deformity. Pupils equal, round, and reactive to light. Extra ocular muscles intact. Conjunctivae/corneas clear. Neck: Supple, with full range of motion. No jugular venous distention. Trachea midline. Respiratory:  Normal respiratory effort. Clear to auscultation, bilaterally without Rales/Wheezes/Rhonchi. Cardiovascular:  Regular rate and rhythm with normal S1/S2 without murmurs, rubs or gallops. Abdomen: Soft,non-tender, non-distended with normal bowel sounds. Musculoskeletal:  No clubbing, cyanosis or edemabilaterally. Full range of motion without deformity. Skin: Skin color, texture, turgor normal.  No rashes orlesions. Neurologic:  Neurovascularly intact without any focal sensory/motor deficits. Cranial nerves: II-XIIintact, grossly non-focal.  Psychiatric:  Alert and oriented, thought content appropriate, normal insight  Capillary Refill: Brisk,< 3 seconds  PeripheralPulses: +2 palpable, equal bilaterally        Labs:         Recent Labs     08/10/22  0547   WBC 13.9*   HGB 12.4   HCT 39.4             Recent Labs     08/10/22  0547      K 4.4      CO2 20*   BUN 39*   CREATININE 1.3*   CALCIUM 9.4      No results for input(s): AST, ALT, BILIDIR, BILITOT, ALKPHOS in the last 72 hours.       Recent Labs     08/10/22  0547   INR 1.05      No results for input(s): Cynthia Espraza in the last 72 hours. Urinalysis:    No results found for: Sydelle Lavelle, BACTERIA, RBCUA, BLOODU, SPECGRAV, GLUCOSEU     ECHO: Results for orders placed during the hospital encounter of 07/29/22     Echo 2D w doppler w color complete     Narrative  Transthoracic Echocardiography Report (TTE)     Demographics     Patient Name  Sunny Restrepo     Gender            Female  Doneta Pro     MR #          [id number]         Race              Other     Ethnicity          or      Account #     [de-identified]         Room Number     Accession     [id number]         Date of Study     07/29/2022  Number     Date of Birth 1960        Referring         Tyrone Robles MD  Physician         Kelly Valdez MD     Age           58 year(s)        Sonographer       MARIA ELENA Ferris,  LUDYCS, RDMS, RVT     Interpreting      Tigre Terry MD  Physician     Procedure     Type of Study     TTE procedure:ECHOCARDIOGRAM COMPLETE 2D W DOPPLER W COLOR. Procedure Date  Date: 07/29/2022 Start: 02:16 PM     Study Location: Echo Lab  Technical Quality: Limited visualization due to body habitus. Indications:Abnormal ECG and Hypertension. Additional Medical History:hyperlipidemia, hypertension, gastroesophageal  reflux disease, osteoarthritis, tobacco abuse, family history of coronary  artery disease, diabetes     Patient Status: Routine     Height: 1 inches Weight: 164 pounds BSA: 0.09 m^2 BMI: 303634.35 kg/m^2     BP: 130/78 mmHg     Conclusions     Summary  Left ventricular size and systolic function is normal. Ejection fraction  was estimated at 55-60%. LV wall thickness is within normal limits and  there are no obvious wall motion abnormalities. The right ventricular size appears normal with normal systolic function  and wall thickness.      Signature     ----------------------------------------------------------------  Electronically signed by Tigre Terry MD (Interpreting  physician) on 07/30/2022 at 11:41 AM  ----------------------------------------------------------------     Findings     Mitral Valve  The mitral valve structure is normal with normal leaflet separation. DOPPLER: The transmitral velocity was within the normal range with no  evidence for mitral stenosis. There was no evidence of mitral  regurgitation. Aortic Valve  The aortic valve appears trileaflet with normal thickness and leaflet  excursion. DOPPLER: Transaortic velocity was within the normal range with  no evidence of aortic stenosis. There was no evidence of aortic  regurgitation. Tricuspid Valve  The tricuspid valve structure is normal with normal leaflet separation. DOPPLER: There is no evidence of tricuspid stenosis. Mild tricuspid regurgitation visualized. Pulmonic Valve  The pulmonic valve leaflets appear normal thickness, and normal cuspal  separation. DOPPLER: The transpulmonic velocity was within the normal  range. No evidence for regurgitation. Left Atrium  Left atrial size is normal.     Left Ventricle  Left ventricular size and systolic function is normal. Ejection fraction  was estimated at 55-60%. LV wall thickness is within normal limits and  there are no obvious wall motion abnormalities. Right Atrium  Right atrial size was normal.     Right Ventricle  The right ventricular size appears normal with normal systolic function  and wall thickness. Pericardial Effusion  The pericardium appears normal with no evidence of a pericardial effusion. Pleural Effusion  No evidence of pleural effusion. Aorta / Great Vessels  -Aortic root dimension within normal limits. -IVC size is within normal limits with normal respiratory phasic changes.      M-Mode/2D Measurements & Calculations     LV Diastolic   LV Systolic Dimension: 3  AV Cusp Separation: 1.3 cmLA  Dimension: 4.5 cm                        Dimension: 3.2 cmAO Root  cm             LV Volume Diastolic: 92.4 Dimension: 2.9 cmLA Area: 17.6  LV FS:33.3 %   ml                        cm^2  LV PW          LV Volume Systolic: 35 ml  Diastolic: 1.3 LV EDV/LV EDV Index: 92.4  cm             ml/1027 m^2LV ESV/LV ESV  Septum         Index: 35 ml/389 m^2      RV Diastolic Dimension: 2.8 cm  Diastolic: 0.9 EF Calculated: 62.1 %  cm                                       LA/Aorta: 1.1  Ascending Aorta: 3.2 cm  LA volume/Index: 54.7 ml /608m^2  LVOT: 1.9 cm     Doppler Measurements & Calculations     MV Peak E-Wave: 79.9 cm/s  AV Peak Velocity:     LVOT Peak Velocity: 78.8  MV Peak A-Wave: 93.5 cm/s  91.2 cm/s             cm/s  MV E/A Ratio: 0.85         AV Peak Gradient:     LVOT Peak Gradient: 2  MV Peak Gradient: 2.55     3.33 mmHg             mmHg  mmHg  TV Peak E-Wave: 61.3 cm/s  MV Deceleration Time: 148                        TV Peak A-Wave: 47.6 cm/s  msec  IVRT: 109 msec        TV Peak Gradient: 1.5  mmHg  MV E' Septal Velocity: 5.8                       TR Velocity:217 cm/s  cm/s                       AV DVI (Vmax):0.86    TR Gradient:18.84 mmHg  MV A' Septal Velocity: 8.2                       PV Peak Velocity: 72.4  cm/s                                             cm/s  MV E' Lateral Velocity:                          PV Peak Gradient: 2.1  7.2 cm/s                                         mmHg  MV A' Lateral Velocity:  11.5 cm/s  E/E' septal: 13.78  E/E' lateral: 11.1  MR Velocity: 311 cm/s     [email or web address] [id number]%3d%3d        Radiology:      CT chest: I have reviewed the CT chest images with the following interpretation: pending  Left heart cath:  I have reviewed the left heart catheterization images with the following interpretation: as per HPI        Code Status: Full Code        ASSESSMENT:           C/[address] Problems     Diagnosis Date Noted    Preoperative clearance [Z01.818] 08/10/2022       Priority: High    CAD in native artery [I25.10] 08/10/2022       Priority: High    Abnormal stress test [R94.39] 08/10/2022       Priority: High    Other fatigue [R53.83] 08/10/2022       Priority: High         PLAN:  58year old female with 3v CAD, including severe left main stenosis, with preserved LV function. Recommendation of CABGx3-4, scheduled for 8/19. The risks, benefits and alternatives were discussed in detail with the patient and family. The risks include bleeding, infection, graft failure, cardiac arrhythmias, thromboembolism, stroke, need for reoperation and death. The patient expressed understanding of these issues, confirmed that all questions were answered, and desires to proceed.

## 2022-08-19 NOTE — LETTER
[facility]  [address] [facility] [address]  Phone: [phone]             August 21, 2022    Patient: Josefina SIBLEY   YOB: 1960   Date of Visit: 8/19/2022       To Whom It May Concern:    LACEY TAVAREZ TOÑITO was seen and treated in our facility [facility] beginning 8/19/2022. Henryradha Chetan may  may return to school on 8/22. .      Sincerely,       Consuelo Ji RN         Signature:__________________________________

## 2022-08-19 NOTE — ANESTHESIA PROCEDURE NOTES
Procedure Performed: JODY       Start Time:  8/19/2022 8:20 AM       End Time:   8/19/2022 8:35 AM    Preanesthesia Checklist:  Patient identified, IV assessed, risks and benefits discussed, monitors and equipment assessed, procedure being performed at surgeon's request and anesthesia consent obtained. General Procedure Information  Diagnostic Indications for Echo:  assessment of ascending aorta, assessment of surgical repair, defect repair evaluation, hemodynamic monitoring and assessment of valve function  Physician Requesting Echo: Noris Evans MD  CPT Code:  36645 [id number]  Location performed:  OR  Intubated  Bite block placed  Heart visualized  Probe Insertion:  Easy  Probe Type:  3D  Modalities:  2D only, 3D, color flow mapping, continuous wave Doppler and pulse wave Doppler    Echocardiographic and Doppler Measurements    Ventricles    Right Ventricle:  Cavity size normal.  Hypertrophy not present. Thrombus not present. Left Ventricle:  Cavity size normal.  Hypertrophy not present. Thrombus not present. Global Function normal.  Ejection Fraction 50%. Ventricular Regional Function:  1- Basal Anteroseptal:  normal  2- Basal Anterior:  normal  3- Basal Anterolateral:  normal  4- Basal Inferolateral:  normal  5- Basal Inferior:  normal  6- Basal Inferoseptal:  normal  7- Mid Anteroseptal:  normal  8- Mid Anterior:  normal  9- Mid Anterolateral:  normal  10- Mid Inferolateral:  normal  11- Mid Inferior:  normal  12- Mid Inferoseptal:  normal  13- Apical Anterior:  normal  14- Apical Lateral:  normal  15- Apical Inferior:  normal  16- Apical Septal:  normal  17- [place]:  normal      Valves    Aortic Valve: Annulus normal.  Stenosis not present. Regurgitation none. Leaflets normal.  Leaflet motions normal.      Mitral Valve: Annulus normal.  Stenosis not present. Regurgitation none. Leaflets normal.  Leaflet motions normal.      Tricuspid Valve: Annulus normal.  Stenosis not present. Regurgitation none. Leaflet motions normal.    Pulmonic Valve: Annulus normal.  Stenosis not present. Regurgitation none.         Aorta    Ascending Aorta:  Size normal.    Aortic Arch:  Size normal.    Descending Aorta:  Size normal.        Atria    Right Atrium:  Size normal.    Left Atrium:  Size normal.  Left atrial appendage normal.      Septa    Atrial Septum:  Intra-atrial septal morphology normal.      Ventricular Septum:  Intra-ventricular septum morphology normal.      Diastolic Function Measurements:  Diastolic Dysfunction Grade=  E=  ms  A=  ms  E/A Ratio=  1.0  DT=  ms  S/D=   IVRT=    Other Findings  Pericardium:  normal  Pleural Effusion:  none  Pulmonary Arteries:  normal      Anesthesia Information  Performed Personally  Anesthesiologist:  Deepika Aponte, DO      Echocardiogram Comments:       Results discussed with surgeonPost Intervention Follow-up Study  Aortic Function: unchangedMitral Function: unchangedTricuspid Function: unchangedNoneComments: Right ventricle global fxn decreased following CPB, left ventricle unchanged

## 2022-08-19 NOTE — PLAN OF CARE
Problem: Discharge Planning  Goal: Discharge to home or other facility with appropriate resources  Outcome: Progressing  Flowsheets  Taken 8/19/2022 1829 by Thi Sparks RN  Discharge to home or other facility with appropriate resources: Identify barriers to discharge with patient and caregiver  Taken 8/19/2022 0653 by Soham Bernardo RN  Discharge to home or other facility with appropriate resources: Identify discharge learning needs (meds, wound care, etc)     Problem: Chronic Conditions and Co-morbidities  Goal: Patient's chronic conditions and co-morbidity symptoms are monitored and maintained or improved  Outcome: Progressing  Flowsheets (Taken 8/19/2022 1829)  Care Plan - Patient's Chronic Conditions and Co-Morbidity Symptoms are Monitored and Maintained or Improved:   Monitor and assess patient's chronic conditions and comorbid symptoms for stability, deterioration, or improvement   Collaborate with multidisciplinary team to address chronic and comorbid conditions and prevent exacerbation or deterioration     Problem: Pain  Goal: Verbalizes/displays adequate comfort level or baseline comfort level  Outcome: Progressing  Flowsheets (Taken 8/19/2022 1829)  Verbalizes/displays adequate comfort level or baseline comfort level: Administer analgesics based on type and severity of pain and evaluate response     Problem: ABCDS Injury Assessment  Goal: Absence of physical injury  Outcome: Progressing  Note: Instruct use of sternal splinting     Problem: Respiratory - Adult  Goal: Achieves optimal ventilation and oxygenation  Outcome: Progressing  Flowsheets (Taken 8/19/2022 1829)  Achieves optimal ventilation and oxygenation:   Assess for changes in respiratory status   Initiate smoking cessation protocol as indicated   Respiratory therapy support as indicated     Problem: Cardiovascular - Adult  Goal: Maintains optimal cardiac output and hemodynamic stability  Outcome: Progressing  Flowsheets (Taken 8/19/2022 1829)  Maintains optimal cardiac output and hemodynamic stability:   Monitor blood pressure and heart rate   Monitor urine output and notify Licensed Independent Practitioner for values outside of normal range   Assess for signs of decreased cardiac output   Administer fluid and/or volume expanders as ordered   Administer vasoactive medications as ordered  Goal: Absence of cardiac dysrhythmias or at baseline  Outcome: Progressing     Problem: Skin/Tissue Integrity - Adult  Goal: Skin integrity remains intact  Outcome: Progressing  Goal: Incisions, wounds, or drain sites healing without S/S of infection  Outcome: Progressing     Problem: Musculoskeletal - Adult  Goal: Return mobility to safest level of function  Outcome: Progressing  Goal: Maintain proper alignment of affected body part  Outcome: Progressing     Problem: Gastrointestinal - Adult  Goal: Minimal or absence of nausea and vomiting  Outcome: Progressing  Goal: Maintains or returns to baseline bowel function  Outcome: Progressing  Goal: Maintains adequate nutritional intake  Outcome: Progressing     Problem: Infection - Adult  Goal: Absence of infection at discharge  Outcome: Progressing  Goal: Absence of infection during hospitalization  Outcome: Progressing  Goal: Absence of fever/infection during anticipated neutropenic period  Outcome: Progressing     Problem: Metabolic/Fluid and Electrolytes - Adult  Goal: Electrolytes maintained within normal limits  Outcome: Progressing  Goal: Hemodynamic stability and optimal renal function maintained  Outcome: Progressing  Goal: Glucose maintained within prescribed range  Outcome: Progressing   Care plan reviewed with patient and family. Patient and family verbalize understanding of the plan of care and contribute to goal setting.

## 2022-08-19 NOTE — OP NOTE
DATE: 08/19/22    PATIENT: Rakel Brambila    MRN: [id number]     Acct: [de-identified]    PREOP DIAGNOSIS:   Coronary artery disease    Postop diagnosis:  Coronary artery disease    Procedure:  1) Coronary artery bypass grafting x 3, with the left internal mammary artery grafted to the left anterior descending artery, a reversed greater saphenous aortocoronary vein graft to the ramus intermedius artery, and a reversed greater saphenous aortocoronary vein graft to the posterior left ventricular artery  2) Endoscopic greater saphenous vein harvest  3) Intraoperative coronary angiography of all distal anastomoses    SURGEON:  Tasha Butler MD    FIRST ASSISTANT OF MEDICAL NECESSITY: JANY Fry    DUE TO THE COMPLEXITY OF THE CASE, THE FIRST ASSISTANT WAS REQUIRED THROUGHOUT THE OPERATION FOR ENDOSCOPIC VEIN HARVEST AND FOR THE CRITICAL PORTIONS OF THE PROCEDURE    INDICATION:  The patient is a 58y.o. year-old diabetic female smoker who presents with an abnormal stress evaluation in the context of triple vessel coronary disease. FINDNGS: There were no intrapericardial adhesions. The mammary artery was small but adequate. The saphenous vein was of good caliber. The coronary targets were quite small, only about 1.6 mm, and diffusely diseased, reflective of longstanding poorly controlled diabetes and tobacco abuse. There were no suitable targets to revascularize directly the right ventricle. CARDIOPULMONARY BYPASS TIME: 144    CROSSCLAMP TIME: 119    ESTIMATED BLOOD LOSS: Minimal (Cardiopulmonary bypass/cell saver)    DESCRIPTION:  The patient was prepped and draped in sterile fashion in the supine position. A formal time-out was performed. Heparin was administered. Working in two teams, the chest was opened, while a segment of greater saphenous vein was harvested using endoscopic techniques from the right thigh.   The left internal mammary artery was mobilized from the chest wall and divided at its distal bifurcation. A retractor was placed, and a pericardial well was created. The ascending aorta was palpated and found to be free of calcifications. The proximal arch was cannulated with a 21 mm arterial cannula. A double stage venous cannula was inserted into the right atrium. A retrograde cardioplegia cannula was inserted via right atrial pursestring into the coronary sinus. An antegrade cardioplegia cannula was placed in the mid distal ascending aorta. Cardiopulmonary bypass was initiated. The coronary targets were inspected. The aorta was crossclamped. 1000 ml of cold blood cardioplegia was administered antegrade, followed by 600 ml retrograde. Thereafter, at 20 minute intervals throughout the crossclamped portion of the operation, cold blood cardioplegia was administered down the completed vein grafts, the coronary sinus, and/or the aortic root. An apical suction device was applied, and the posterior inferior wall was exposed. An arteriotomy was made in the mid posterior left ventricular coronary artery, and a segment of reversed greater saphenous vein was anastomosed in end to side fashion using running 7-0 Prolene. Patency was confirmed by direct administration of cardioplegia down the graft at a rate of 70 ml/min, followed by the direct injection of fluorescein contrast into the vein graft under real-time coronary angiography, showing excellent flow into the native coronary. The lateral  wall was exposed. An arteriotomy was made in the proximal ramus intermedius coronary artery, and a separate segment of reversed greater saphenous vein was anastomosed in end to side fashion using running 7-0 Prolene. Patency was confirmed by direct administration of cardioplegia down the graft at a rate of 70 ml/min, followed by the direct injection of fluorescein contrast into the vein graft under real-time coronary angiography, showing excellent flow into the native coronary.     The anterolateral wall was exposed. The appropriately trimmed left internal mammary artery was anastomosed in end to side fashion to the mid distal left anterior descending coronary artery using running 7-0 Prolene. Patency was confirmed with the injection into the cardiopulmonary bypass circuit of fluorescein contrast under real-time coronary angiography, showing excellent flow into the native coronary. The adventitia was trimmed off the mid ascending aorta. Two 4.4 mm punch aortotomies were made. The appropriately trimmed proximal ends of the vein grafts were anastomosed to the ascending aorta using running 6-0 Prolene. The patient was then placed in steep Trendelenburg position with the aortic root vent turned on full and retrograde cardioplegia infusing, so as to de-air the left heart. The crossclamp was removed, and the vein grafts were de-aired. Atrial and ventricular pacing wires were placed. The patient weaned  from cardiopulmonary bypass with excellent left ventricular contractility. Protamine was administered. The patient was de-cannulated. All sites were verified as hemostatic. Chest tubes were placed in the mediastinum and the pleural cavities. The chest was closed using #7 wire for the sternum, followed by the usual 3-layer soft tissue closure. The patient transferred to the ICU in stable condition.

## 2022-08-20 ENCOUNTER — APPOINTMENT (OUTPATIENT)
Dept: GENERAL RADIOLOGY | Age: 62
DRG: 166 | End: 2022-08-20
Attending: THORACIC SURGERY (CARDIOTHORACIC VASCULAR SURGERY)
Payer: COMMERCIAL

## 2022-08-20 LAB
ANION GAP SERPL CALCULATED.3IONS-SCNC: 6 MEQ/L (ref 8–16)
BUN BLDV-MCNC: 14 MG/DL (ref 7–22)
CALCIUM SERPL-MCNC: 8.9 MG/DL (ref 8.5–10.5)
CHLORIDE BLD-SCNC: 107 MEQ/L (ref 98–111)
CO2: 27 MEQ/L (ref 23–33)
CREAT SERPL-MCNC: 0.8 MG/DL (ref 0.4–1.2)
ERYTHROCYTE [DISTWIDTH] IN BLOOD BY AUTOMATED COUNT: 13.6 % (ref 11.5–14.5)
ERYTHROCYTE [DISTWIDTH] IN BLOOD BY AUTOMATED COUNT: 44.7 FL (ref 35–45)
GFR SERPL CREATININE-BSD FRML MDRD: 73 ML/MIN/1.73M2
GLUCOSE BLD-MCNC: 110 MG/DL (ref 70–108)
GLUCOSE BLD-MCNC: 114 MG/DL (ref 70–108)
GLUCOSE BLD-MCNC: 117 MG/DL (ref 70–108)
GLUCOSE BLD-MCNC: 123 MG/DL (ref 70–108)
GLUCOSE BLD-MCNC: 125 MG/DL (ref 70–108)
GLUCOSE BLD-MCNC: 191 MG/DL (ref 70–108)
GLUCOSE BLD-MCNC: 223 MG/DL (ref 70–108)
GLUCOSE BLD-MCNC: 224 MG/DL (ref 70–108)
GLUCOSE BLD-MCNC: 246 MG/DL (ref 70–108)
GLUCOSE BLD-MCNC: 96 MG/DL (ref 70–108)
HCT VFR BLD CALC: 33.4 % (ref 37–47)
HEMOGLOBIN: 10.7 GM/DL (ref 12–16)
INR BLD: 1.16 (ref 0.85–1.13)
MAGNESIUM: 1.9 MG/DL (ref 1.6–2.4)
MCH RBC QN AUTO: 28.9 PG (ref 26–33)
MCHC RBC AUTO-ENTMCNC: 32 GM/DL (ref 32.2–35.5)
MCV RBC AUTO: 90.3 FL (ref 81–99)
PLATELET # BLD: 115 THOU/MM3 (ref 130–400)
PMV BLD AUTO: 10.2 FL (ref 9.4–12.4)
POTASSIUM SERPL-SCNC: 4.4 MEQ/L (ref 3.5–5.2)
RBC # BLD: 3.7 MILL/MM3 (ref 4.2–5.4)
SODIUM BLD-SCNC: 140 MEQ/L (ref 135–145)
WBC # BLD: 20.5 THOU/MM3 (ref 4.8–10.8)

## 2022-08-20 PROCEDURE — 94761 N-INVAS EAR/PLS OXIMETRY MLT: CPT

## 2022-08-20 PROCEDURE — 71045 X-RAY EXAM CHEST 1 VIEW: CPT

## 2022-08-20 PROCEDURE — 94660 CPAP INITIATION&MGMT: CPT

## 2022-08-20 PROCEDURE — 82948 REAGENT STRIP/BLOOD GLUCOSE: CPT

## 2022-08-20 PROCEDURE — 97530 THERAPEUTIC ACTIVITIES: CPT

## 2022-08-20 PROCEDURE — 97167 OT EVAL HIGH COMPLEX 60 MIN: CPT

## 2022-08-20 PROCEDURE — 6370000000 HC RX 637 (ALT 250 FOR IP): Performed by: THORACIC SURGERY (CARDIOTHORACIC VASCULAR SURGERY)

## 2022-08-20 PROCEDURE — 2580000003 HC RX 258: Performed by: THORACIC SURGERY (CARDIOTHORACIC VASCULAR SURGERY)

## 2022-08-20 PROCEDURE — 85610 PROTHROMBIN TIME: CPT

## 2022-08-20 PROCEDURE — 6360000002 HC RX W HCPCS: Performed by: THORACIC SURGERY (CARDIOTHORACIC VASCULAR SURGERY)

## 2022-08-20 PROCEDURE — 2060000000 HC ICU INTERMEDIATE R&B

## 2022-08-20 PROCEDURE — 93005 ELECTROCARDIOGRAM TRACING: CPT | Performed by: THORACIC SURGERY (CARDIOTHORACIC VASCULAR SURGERY)

## 2022-08-20 PROCEDURE — 85027 COMPLETE CBC AUTOMATED: CPT

## 2022-08-20 PROCEDURE — 80048 BASIC METABOLIC PNL TOTAL CA: CPT

## 2022-08-20 PROCEDURE — 83735 ASSAY OF MAGNESIUM: CPT

## 2022-08-20 PROCEDURE — 2700000000 HC OXYGEN THERAPY PER DAY

## 2022-08-20 RX ORDER — FAMOTIDINE 20 MG/1
20 TABLET, FILM COATED ORAL 2 TIMES DAILY
Status: DISCONTINUED | OUTPATIENT
Start: 2022-08-20 | End: 2022-08-24 | Stop reason: HOSPADM

## 2022-08-20 RX ORDER — POTASSIUM CHLORIDE 20 MEQ/1
20 TABLET, EXTENDED RELEASE ORAL PRN
Status: DISCONTINUED | OUTPATIENT
Start: 2022-08-20 | End: 2022-08-24 | Stop reason: HOSPADM

## 2022-08-20 RX ORDER — ASPIRIN 81 MG/1
81 TABLET ORAL DAILY
Status: DISCONTINUED | OUTPATIENT
Start: 2022-08-21 | End: 2022-08-24 | Stop reason: HOSPADM

## 2022-08-20 RX ORDER — POTASSIUM CHLORIDE 20 MEQ/1
40 TABLET, EXTENDED RELEASE ORAL PRN
Status: DISCONTINUED | OUTPATIENT
Start: 2022-08-20 | End: 2022-08-24 | Stop reason: HOSPADM

## 2022-08-20 RX ORDER — FUROSEMIDE 10 MG/ML
20 INJECTION INTRAMUSCULAR; INTRAVENOUS 2 TIMES DAILY
Status: DISCONTINUED | OUTPATIENT
Start: 2022-08-20 | End: 2022-08-23

## 2022-08-20 RX ORDER — INSULIN GLARGINE 100 [IU]/ML
25 INJECTION, SOLUTION SUBCUTANEOUS 2 TIMES DAILY
Status: DISCONTINUED | OUTPATIENT
Start: 2022-08-20 | End: 2022-08-21

## 2022-08-20 RX ORDER — POTASSIUM CHLORIDE 7.45 MG/ML
10 INJECTION INTRAVENOUS PRN
Status: DISCONTINUED | OUTPATIENT
Start: 2022-08-20 | End: 2022-08-24 | Stop reason: HOSPADM

## 2022-08-20 RX ORDER — INSULIN LISPRO 100 [IU]/ML
0-16 INJECTION, SOLUTION INTRAVENOUS; SUBCUTANEOUS
Status: DISCONTINUED | OUTPATIENT
Start: 2022-08-20 | End: 2022-08-24 | Stop reason: HOSPADM

## 2022-08-20 RX ORDER — INSULIN LISPRO 100 [IU]/ML
0-4 INJECTION, SOLUTION INTRAVENOUS; SUBCUTANEOUS NIGHTLY
Status: DISCONTINUED | OUTPATIENT
Start: 2022-08-20 | End: 2022-08-24 | Stop reason: HOSPADM

## 2022-08-20 RX ORDER — CLOPIDOGREL BISULFATE 75 MG/1
75 TABLET ORAL DAILY
Status: DISCONTINUED | OUTPATIENT
Start: 2022-08-20 | End: 2022-08-24 | Stop reason: HOSPADM

## 2022-08-20 RX ORDER — METOPROLOL TARTRATE 50 MG/1
50 TABLET, FILM COATED ORAL 2 TIMES DAILY
Status: DISCONTINUED | OUTPATIENT
Start: 2022-08-20 | End: 2022-08-22

## 2022-08-20 RX ADMIN — CEFAZOLIN 2000 MG: 10 INJECTION, POWDER, FOR SOLUTION INTRAVENOUS at 16:30

## 2022-08-20 RX ADMIN — OXYCODONE 10 MG: 5 TABLET ORAL at 11:22

## 2022-08-20 RX ADMIN — OXYCODONE 10 MG: 5 TABLET ORAL at 06:00

## 2022-08-20 RX ADMIN — FAMOTIDINE 20 MG: 20 TABLET ORAL at 20:46

## 2022-08-20 RX ADMIN — POTASSIUM CHLORIDE 20 MEQ: 29.8 INJECTION, SOLUTION INTRAVENOUS at 06:33

## 2022-08-20 RX ADMIN — INSULIN LISPRO 4 UNITS: 100 INJECTION, SOLUTION INTRAVENOUS; SUBCUTANEOUS at 18:40

## 2022-08-20 RX ADMIN — OXYCODONE 10 MG: 5 TABLET ORAL at 16:09

## 2022-08-20 RX ADMIN — INSULIN GLARGINE 25 UNITS: 100 INJECTION, SOLUTION SUBCUTANEOUS at 21:03

## 2022-08-20 RX ADMIN — Medication 1 TABLET: at 08:19

## 2022-08-20 RX ADMIN — CEFAZOLIN 2000 MG: 10 INJECTION, POWDER, FOR SOLUTION INTRAVENOUS at 07:59

## 2022-08-20 RX ADMIN — SENNOSIDES AND DOCUSATE SODIUM 1 TABLET: 50; 8.6 TABLET ORAL at 20:46

## 2022-08-20 RX ADMIN — ASPIRIN 325 MG: 325 TABLET, COATED ORAL at 09:41

## 2022-08-20 RX ADMIN — AMIODARONE HYDROCHLORIDE 200 MG: 200 TABLET ORAL at 20:46

## 2022-08-20 RX ADMIN — CLOPIDOGREL BISULFATE 75 MG: 75 TABLET ORAL at 10:40

## 2022-08-20 RX ADMIN — METOPROLOL TARTRATE 50 MG: 50 TABLET, FILM COATED ORAL at 11:36

## 2022-08-20 RX ADMIN — SODIUM CHLORIDE, PRESERVATIVE FREE 10 ML: 5 INJECTION INTRAVENOUS at 20:46

## 2022-08-20 RX ADMIN — METOPROLOL TARTRATE 50 MG: 50 TABLET, FILM COATED ORAL at 20:46

## 2022-08-20 RX ADMIN — SENNOSIDES AND DOCUSATE SODIUM 1 TABLET: 50; 8.6 TABLET ORAL at 09:45

## 2022-08-20 RX ADMIN — AMIODARONE HYDROCHLORIDE 200 MG: 200 TABLET ORAL at 09:39

## 2022-08-20 RX ADMIN — ATORVASTATIN CALCIUM 40 MG: 40 TABLET, FILM COATED ORAL at 20:46

## 2022-08-20 RX ADMIN — INSULIN GLARGINE 25 UNITS: 100 INJECTION, SOLUTION SUBCUTANEOUS at 16:10

## 2022-08-20 RX ADMIN — FUROSEMIDE 20 MG: 10 INJECTION, SOLUTION INTRAMUSCULAR; INTRAVENOUS at 11:37

## 2022-08-20 RX ADMIN — ENOXAPARIN SODIUM 40 MG: 100 INJECTION SUBCUTANEOUS at 09:41

## 2022-08-20 RX ADMIN — FUROSEMIDE 20 MG: 10 INJECTION, SOLUTION INTRAMUSCULAR; INTRAVENOUS at 18:35

## 2022-08-20 RX ADMIN — SODIUM CHLORIDE, PRESERVATIVE FREE 10 ML: 5 INJECTION INTRAVENOUS at 09:45

## 2022-08-20 RX ADMIN — CEFAZOLIN 2000 MG: 10 INJECTION, POWDER, FOR SOLUTION INTRAVENOUS at 23:45

## 2022-08-20 RX ADMIN — OXYCODONE 10 MG: 5 TABLET ORAL at 20:46

## 2022-08-20 ASSESSMENT — PAIN DESCRIPTION - ONSET
ONSET: ON-GOING

## 2022-08-20 ASSESSMENT — PAIN DESCRIPTION - DIRECTION: RADIATING_TOWARDS: BACK

## 2022-08-20 ASSESSMENT — PAIN DESCRIPTION - DESCRIPTORS
DESCRIPTORS: ACHING;DISCOMFORT
DESCRIPTORS: DISCOMFORT
DESCRIPTORS: DISCOMFORT
DESCRIPTORS: ACHING;DISCOMFORT

## 2022-08-20 ASSESSMENT — PAIN SCALES - GENERAL
PAINLEVEL_OUTOF10: 5
PAINLEVEL_OUTOF10: 7
PAINLEVEL_OUTOF10: 5
PAINLEVEL_OUTOF10: 7
PAINLEVEL_OUTOF10: 7
PAINLEVEL_OUTOF10: 2
PAINLEVEL_OUTOF10: 6

## 2022-08-20 ASSESSMENT — PAIN DESCRIPTION - FREQUENCY
FREQUENCY: CONTINUOUS

## 2022-08-20 ASSESSMENT — PAIN DESCRIPTION - LOCATION
LOCATION: CHEST;INCISION

## 2022-08-20 ASSESSMENT — PAIN - FUNCTIONAL ASSESSMENT
PAIN_FUNCTIONAL_ASSESSMENT: PREVENTS OR INTERFERES SOME ACTIVE ACTIVITIES AND ADLS
PAIN_FUNCTIONAL_ASSESSMENT: PREVENTS OR INTERFERES WITH MANY ACTIVE NOT PASSIVE ACTIVITIES

## 2022-08-20 ASSESSMENT — PAIN DESCRIPTION - ORIENTATION
ORIENTATION: MID

## 2022-08-20 ASSESSMENT — PAIN DESCRIPTION - PAIN TYPE
TYPE: SURGICAL PAIN

## 2022-08-20 NOTE — PROGRESS NOTES
Pharmacy Renal Adjustment    Annamarie Britton is a 58 y.o. female. Pharmacy renally adjust the following medications per P[employer]T approved policy: famotidine    Height:   Ht Readings from Last 1 Encounters:   08/19/22 5' 1\" (1.549 m)     Weight:  Wt Readings from Last 1 Encounters:   08/19/22 192 lb 14.4 oz (87.5 kg)     Recent Labs     08/19/22  1350 08/20/22  0530   BUN 15 14   CREATININE 0.9 0.8     Estimated Creatinine Clearance: 73 mL/min (based on SCr of 0.8 mg/dL).   Calculated CrCl:    Assessment:  JOANNA - resolved    Plan:   Increase famotidine from 20 mg daily to BID    Apple Tiwari, PharmD, BCPS, BCCCP  8/20/2022 3:45 PM

## 2022-08-20 NOTE — PROGRESS NOTES
Used  service to assess patient for pain and needs. Patient complaining of pinched nerve in neck. Patient denies any nausea. Patient able to produce a weak cough with much encouragement but did not display much effort in the attempt. Ice chips provided while mask off. Patient denied other needs at this time.

## 2022-08-20 NOTE — ANESTHESIA POSTPROCEDURE EVALUATION
Department of Anesthesiology  Postprocedure Note    Patient: Alejandrina Hinson  MRN: [id number]  YOB: 1960  Date of evaluation: 8/20/2022      Procedure Summary     Date: 08/19/22 Room / Location: [address] [facility]    Anesthesia Start: 3114 Anesthesia Stop: 7983    Procedure: CABGx3 / JODY (Chest) Diagnosis:       Coronary artery disease, unspecified vessel or lesion type, unspecified whether angina present, unspecified whether native or transplanted heart      (Coronary artery disease, unspecified vessel or lesion type, unspecified whether angina present, unspecified whether native or transplanted heart [I25.10])    Surgeons: Jana Fu MD Responsible Provider: Gene Silva DO    Anesthesia Type: general ASA Status: 4          Anesthesia Type: No value filed. Mariano Phase I: Mariano Score: 10    Mariano Phase II:        Anesthesia Post Evaluation    Patient location during evaluation: floor  Patient participation: complete - patient participated  Level of consciousness: awake and alert  Airway patency: patent  Nausea & Vomiting: no nausea and no vomiting  Cardiovascular status: blood pressure returned to baseline  Respiratory status: acceptable, spontaneous ventilation and nasal cannula  Hydration status: euvolemic  Comments: Pt up and walking around room.

## 2022-08-20 NOTE — PROGRESS NOTES
Cardiovascular Surgery Progress Note      Comfortable on NC  Good hemodynamics, no inotropes  EKG pan ST elevation suggestive of pericarditis, characteristic of diabetic patients, and given good hemodynamics  CXR no space issues  Labs ok  -Routine diuresis  -Epicardial wires removed, start clopidogrel  -Transfer floor

## 2022-08-20 NOTE — PROGRESS NOTES
[facility] ICU STEPDOWN TELEMETRY 4K  EVALUATION    Time:   Time In: 3374  Time Out: 5881  Timed Code Treatment Minutes: 12 Minutes  Minutes: 26          Date: 2022  Patient Name: Dory Osei,   Gender: female      MRN: [id number]  : 1960  (58 y.o.)  Referring Practitioner: Barb Eaton MD  Diagnosis: CAD, multiple vessel  Additional Pertinent Hx: Per H&P: 58 y.o. diabetic female, no previous history of CAD, originally referred for preoperative clearance for a cervical orthopedic procedure. Abnormal EKG prompted LHC, showing 3v CAD, including severe left main disease, and RCA . Patient reports only chronic progressive fatigue and dyspnea on exertion; denies chest pain, presyncope, or orthopnea. Pt s/p CABG X3 on 22. Restrictions/Precautions:  Restrictions/Precautions: Surgical Protocols, General Precautions, Fall Risk  Position Activity Restriction  Sternal Precautions: No Pushing, No Pulling, 5# Lifting Restrictions  Other position/activity restrictions: Samoan speaking-needs     Subjective  Chart Reviewed: Yes, Orders, Progress Notes, History and Physical, Operative Notes  Patient assessed for rehabilitation services?: Yes  Family / Caregiver Present: No    Subjective: Pt supine in bed upon arrival with RN present in room, about to transition to EOB. Pt agreeable to OT session. Pain: c/o pain over sternum/incision although does not quantify. Vitals: Oxygen: 94% on 2L O2 via NC  Heart Rate: 97 bpm  All vitals remained stable throughout  **Pt with CT X2, temporary pacer (although not turned on), ugalde, and O2 still in place.     Social/Functional History:  Lives With: Spouse  Type of Home: Apartment  Home Layout: One level  Home Access: Level entry  Home Equipment:  (none)   Bathroom Shower/Tub: Tub/Shower unit  Bathroom Toilet: Standard  Bathroom Equipment:  (none)       ADL Assistance: Independent  Homemaking Assistance: Independent  Ambulation Assistance: Independent  Transfer Assistance: Independent          Additional Comments: Sister will be staying with pt at discharge when  is at work. VISION:WFL    HEARING:  WFL    COGNITION: WFL    RANGE OF MOTION:  Bilateral Upper Extremity:  WFL, although assessed shoulders only to 90 degrees due to sternal prec    STRENGTH:  Bilateral Upper Extremity:  Not Tested due to sternal prec    ADL:   No ADL's completed this session. Gerald Briones BALANCE:  Sitting Balance:  Stand By Assistance. Standing Balance: Contact Guard Assistance. BED MOBILITY:  Supine to Sit: Moderate Assistance, X 1, with head of bed raised, with verbal cues , with increased time for completion    Scooting: Minimal Assistance for advancing hips forward to EOB  **Moderate cues for sternal prec    TRANSFERS:  Sit to Stand:  [address], with increased time for completion, with verbal cues. Stand to Sit: [address], with increased time for completion, with verbal cues. FUNCTIONAL MOBILITY:  Assistive Device: hand held assist/IV pole for support  Assist Level:  Contact Guard Assistance. Distance:  around bed to bedside chair  Slow pace, slightly unsteady. Activity Tolerance:  Patient tolerance of  treatment: good. Assessment:  Assessment: Pt presents requiring increased assistance for ADLs, transfers, and functional mobility compared to PLOF. Pt will continue to benefit from OT services to improve independence with these tasks, in addition to overall strength/endurance to facilitate return to PLOF. Performance deficits / Impairments: Decreased functional mobility , Decreased ADL status, Decreased strength, Decreased endurance, Decreased balance, Decreased high-level IADLs  Prognosis: Good  REQUIRES OT FOLLOW-UP: Yes  Decision Making: High Complexity    Treatment Initiated: Treatment and education initiated within context of evaluation.   Evaluation time for toilet and tub/shower transfers. Short Term Goal 5: Pt will tolerate CABG step II/III exercises X10-15 reps to increase overall strength/endurance needed for ADL completion. Following session, patient left in safe position with all fall risk precautions in place.

## 2022-08-21 ENCOUNTER — APPOINTMENT (OUTPATIENT)
Dept: GENERAL RADIOLOGY | Age: 62
DRG: 166 | End: 2022-08-21
Attending: THORACIC SURGERY (CARDIOTHORACIC VASCULAR SURGERY)
Payer: COMMERCIAL

## 2022-08-21 LAB
ANION GAP SERPL CALCULATED.3IONS-SCNC: 10 MEQ/L (ref 8–16)
BACTERIA: ABNORMAL
BILIRUBIN URINE: NEGATIVE
BLOOD, URINE: ABNORMAL
BUN BLDV-MCNC: 19 MG/DL (ref 7–22)
CALCIUM SERPL-MCNC: 8.8 MG/DL (ref 8.5–10.5)
CASTS: ABNORMAL /LPF
CASTS: ABNORMAL /LPF
CHARACTER, URINE: CLEAR
CHLORIDE BLD-SCNC: 104 MEQ/L (ref 98–111)
CO2: 25 MEQ/L (ref 23–33)
COLOR: YELLOW
CREAT SERPL-MCNC: 0.9 MG/DL (ref 0.4–1.2)
CRYSTALS: ABNORMAL
EKG ATRIAL RATE: 97 BPM
EKG P AXIS: 77 DEGREES
EKG P-R INTERVAL: 178 MS
EKG Q-T INTERVAL: 360 MS
EKG QRS DURATION: 84 MS
EKG QTC CALCULATION (BAZETT): 457 MS
EKG R AXIS: 33 DEGREES
EKG T AXIS: 64 DEGREES
EKG VENTRICULAR RATE: 97 BPM
EPITHELIAL CELLS, UA: ABNORMAL /HPF
ERYTHROCYTE [DISTWIDTH] IN BLOOD BY AUTOMATED COUNT: 13.6 % (ref 11.5–14.5)
ERYTHROCYTE [DISTWIDTH] IN BLOOD BY AUTOMATED COUNT: 46.4 FL (ref 35–45)
GFR SERPL CREATININE-BSD FRML MDRD: 63 ML/MIN/1.73M2
GLUCOSE BLD-MCNC: 172 MG/DL (ref 70–108)
GLUCOSE BLD-MCNC: 192 MG/DL (ref 70–108)
GLUCOSE BLD-MCNC: 208 MG/DL (ref 70–108)
GLUCOSE BLD-MCNC: 215 MG/DL (ref 70–108)
GLUCOSE BLD-MCNC: 228 MG/DL (ref 70–108)
GLUCOSE, URINE: NEGATIVE MG/DL
HCT VFR BLD CALC: 35.2 % (ref 37–47)
HEMOGLOBIN: 10.9 GM/DL (ref 12–16)
KETONES, URINE: 15
LEUKOCYTE EST, POC: NEGATIVE
MCH RBC QN AUTO: 28.7 PG (ref 26–33)
MCHC RBC AUTO-ENTMCNC: 31 GM/DL (ref 32.2–35.5)
MCV RBC AUTO: 92.6 FL (ref 81–99)
MISCELLANEOUS LAB TEST RESULT: ABNORMAL
NITRITE, URINE: NEGATIVE
PH UA: 6 (ref 5–9)
PLATELET # BLD: 135 THOU/MM3 (ref 130–400)
PMV BLD AUTO: 10.1 FL (ref 9.4–12.4)
POTASSIUM SERPL-SCNC: 4.1 MEQ/L (ref 3.5–5.2)
PROTEIN UA: 100 MG/DL
RBC # BLD: 3.8 MILL/MM3 (ref 4.2–5.4)
RBC URINE: ABNORMAL /HPF
RENAL EPITHELIAL, UA: ABNORMAL
SODIUM BLD-SCNC: 139 MEQ/L (ref 135–145)
SPECIFIC GRAVITY UA: 1.01 (ref 1–1.03)
UROBILINOGEN, URINE: 0.2 EU/DL (ref 0–1)
WBC # BLD: 24.3 THOU/MM3 (ref 4.8–10.8)
WBC UA: ABNORMAL /HPF
YEAST: ABNORMAL

## 2022-08-21 PROCEDURE — 6360000002 HC RX W HCPCS: Performed by: THORACIC SURGERY (CARDIOTHORACIC VASCULAR SURGERY)

## 2022-08-21 PROCEDURE — 2580000003 HC RX 258: Performed by: THORACIC SURGERY (CARDIOTHORACIC VASCULAR SURGERY)

## 2022-08-21 PROCEDURE — 6370000000 HC RX 637 (ALT 250 FOR IP): Performed by: THORACIC SURGERY (CARDIOTHORACIC VASCULAR SURGERY)

## 2022-08-21 PROCEDURE — 93010 ELECTROCARDIOGRAM REPORT: CPT | Performed by: INTERNAL MEDICINE

## 2022-08-21 PROCEDURE — 80048 BASIC METABOLIC PNL TOTAL CA: CPT

## 2022-08-21 PROCEDURE — 97116 GAIT TRAINING THERAPY: CPT

## 2022-08-21 PROCEDURE — 85027 COMPLETE CBC AUTOMATED: CPT

## 2022-08-21 PROCEDURE — 97163 PT EVAL HIGH COMPLEX 45 MIN: CPT

## 2022-08-21 PROCEDURE — 2060000000 HC ICU INTERMEDIATE R&B

## 2022-08-21 PROCEDURE — 71045 X-RAY EXAM CHEST 1 VIEW: CPT

## 2022-08-21 PROCEDURE — 81001 URINALYSIS AUTO W/SCOPE: CPT

## 2022-08-21 PROCEDURE — 82948 REAGENT STRIP/BLOOD GLUCOSE: CPT

## 2022-08-21 RX ORDER — INSULIN GLARGINE 100 [IU]/ML
5 INJECTION, SOLUTION SUBCUTANEOUS ONCE
Status: COMPLETED | OUTPATIENT
Start: 2022-08-21 | End: 2022-08-21

## 2022-08-21 RX ORDER — INSULIN GLARGINE 100 [IU]/ML
30 INJECTION, SOLUTION SUBCUTANEOUS 2 TIMES DAILY
Status: DISCONTINUED | OUTPATIENT
Start: 2022-08-21 | End: 2022-08-22

## 2022-08-21 RX ADMIN — ENOXAPARIN SODIUM 40 MG: 100 INJECTION SUBCUTANEOUS at 09:58

## 2022-08-21 RX ADMIN — SODIUM CHLORIDE, PRESERVATIVE FREE 10 ML: 5 INJECTION INTRAVENOUS at 20:31

## 2022-08-21 RX ADMIN — METOPROLOL TARTRATE 50 MG: 50 TABLET, FILM COATED ORAL at 10:14

## 2022-08-21 RX ADMIN — Medication 1 TABLET: at 10:14

## 2022-08-21 RX ADMIN — SENNOSIDES AND DOCUSATE SODIUM 1 TABLET: 50; 8.6 TABLET ORAL at 10:10

## 2022-08-21 RX ADMIN — INSULIN LISPRO 4 UNITS: 100 INJECTION, SOLUTION INTRAVENOUS; SUBCUTANEOUS at 13:15

## 2022-08-21 RX ADMIN — FUROSEMIDE 20 MG: 10 INJECTION, SOLUTION INTRAMUSCULAR; INTRAVENOUS at 18:27

## 2022-08-21 RX ADMIN — OXYCODONE 10 MG: 5 TABLET ORAL at 18:27

## 2022-08-21 RX ADMIN — INSULIN GLARGINE 5 UNITS: 100 INJECTION, SOLUTION SUBCUTANEOUS at 13:15

## 2022-08-21 RX ADMIN — MAGNESIUM HYDROXIDE 30 ML: 400 SUSPENSION ORAL at 10:10

## 2022-08-21 RX ADMIN — OXYCODONE 10 MG: 5 TABLET ORAL at 10:10

## 2022-08-21 RX ADMIN — OXYCODONE 10 MG: 5 TABLET ORAL at 23:40

## 2022-08-21 RX ADMIN — SENNOSIDES AND DOCUSATE SODIUM 1 TABLET: 50; 8.6 TABLET ORAL at 20:30

## 2022-08-21 RX ADMIN — INSULIN GLARGINE 25 UNITS: 100 INJECTION, SOLUTION SUBCUTANEOUS at 09:57

## 2022-08-21 RX ADMIN — METOPROLOL TARTRATE 50 MG: 50 TABLET, FILM COATED ORAL at 20:30

## 2022-08-21 RX ADMIN — OXYCODONE 10 MG: 5 TABLET ORAL at 06:09

## 2022-08-21 RX ADMIN — SODIUM CHLORIDE, PRESERVATIVE FREE 10 ML: 5 INJECTION INTRAVENOUS at 10:03

## 2022-08-21 RX ADMIN — INSULIN GLARGINE 30 UNITS: 100 INJECTION, SOLUTION SUBCUTANEOUS at 20:49

## 2022-08-21 RX ADMIN — FUROSEMIDE 20 MG: 10 INJECTION, SOLUTION INTRAMUSCULAR; INTRAVENOUS at 10:02

## 2022-08-21 RX ADMIN — FAMOTIDINE 20 MG: 20 TABLET ORAL at 10:14

## 2022-08-21 RX ADMIN — OXYCODONE 10 MG: 5 TABLET ORAL at 14:26

## 2022-08-21 RX ADMIN — AMIODARONE HYDROCHLORIDE 200 MG: 200 TABLET ORAL at 20:30

## 2022-08-21 RX ADMIN — CLOPIDOGREL BISULFATE 75 MG: 75 TABLET ORAL at 10:14

## 2022-08-21 RX ADMIN — INSULIN LISPRO 4 UNITS: 100 INJECTION, SOLUTION INTRAVENOUS; SUBCUTANEOUS at 17:33

## 2022-08-21 RX ADMIN — FAMOTIDINE 20 MG: 20 TABLET ORAL at 20:30

## 2022-08-21 RX ADMIN — OXYCODONE 10 MG: 5 TABLET ORAL at 01:30

## 2022-08-21 RX ADMIN — ATORVASTATIN CALCIUM 40 MG: 40 TABLET, FILM COATED ORAL at 20:30

## 2022-08-21 RX ADMIN — ASPIRIN 81 MG: 81 TABLET, COATED ORAL at 10:10

## 2022-08-21 RX ADMIN — AMIODARONE HYDROCHLORIDE 200 MG: 200 TABLET ORAL at 10:14

## 2022-08-21 ASSESSMENT — PAIN DESCRIPTION - ONSET
ONSET: ON-GOING

## 2022-08-21 ASSESSMENT — PAIN DESCRIPTION - LOCATION
LOCATION: CHEST;INCISION

## 2022-08-21 ASSESSMENT — PAIN SCALES - GENERAL
PAINLEVEL_OUTOF10: 5
PAINLEVEL_OUTOF10: 5
PAINLEVEL_OUTOF10: 7
PAINLEVEL_OUTOF10: 5
PAINLEVEL_OUTOF10: 7
PAINLEVEL_OUTOF10: 5
PAINLEVEL_OUTOF10: 7
PAINLEVEL_OUTOF10: 5
PAINLEVEL_OUTOF10: 7
PAINLEVEL_OUTOF10: 5
PAINLEVEL_OUTOF10: 7

## 2022-08-21 ASSESSMENT — PAIN - FUNCTIONAL ASSESSMENT
PAIN_FUNCTIONAL_ASSESSMENT: ACTIVITIES ARE NOT PREVENTED
PAIN_FUNCTIONAL_ASSESSMENT: ACTIVITIES ARE NOT PREVENTED
PAIN_FUNCTIONAL_ASSESSMENT: PREVENTS OR INTERFERES SOME ACTIVE ACTIVITIES AND ADLS
PAIN_FUNCTIONAL_ASSESSMENT: PREVENTS OR INTERFERES WITH MANY ACTIVE NOT PASSIVE ACTIVITIES

## 2022-08-21 ASSESSMENT — PAIN DESCRIPTION - PAIN TYPE
TYPE: SURGICAL PAIN

## 2022-08-21 ASSESSMENT — PAIN DESCRIPTION - DESCRIPTORS
DESCRIPTORS: ACHING;DISCOMFORT
DESCRIPTORS: DISCOMFORT
DESCRIPTORS: ACHING;DISCOMFORT

## 2022-08-21 ASSESSMENT — PAIN DESCRIPTION - ORIENTATION
ORIENTATION: MID

## 2022-08-21 ASSESSMENT — PAIN DESCRIPTION - FREQUENCY
FREQUENCY: CONTINUOUS

## 2022-08-21 NOTE — FLOWSHEET NOTE
08/21/22 1544   Safe Environment   Safety Measures Other (comment)  (Virtual safety check complete)   Pt resting in bed, call light in reach, visitors at bedside. No safety concerns.

## 2022-08-21 NOTE — PLAN OF CARE
Problem: Discharge Planning  Goal: Discharge to home or other facility with appropriate resources  Outcome: Progressing  Flowsheets  Taken 8/21/2022 0157  Discharge to home or other facility with appropriate resources:   Identify barriers to discharge with patient and caregiver   Arrange for needed discharge resources and transportation as appropriate   Identify discharge learning needs (meds, wound care, etc)  Taken 8/20/2022 2041  Discharge to home or other facility with appropriate resources:   Identify barriers to discharge with patient and caregiver   Arrange for needed discharge resources and transportation as appropriate   Identify discharge learning needs (meds, wound care, etc)     Problem: Chronic Conditions and Co-morbidities  Goal: Patient's chronic conditions and co-morbidity symptoms are monitored and maintained or improved  Outcome: Progressing  Flowsheets  Taken 8/21/2022 0157  Care Plan - Patient's Chronic Conditions and Co-Morbidity Symptoms are Monitored and Maintained or Improved:   Monitor and assess patient's chronic conditions and comorbid symptoms for stability, deterioration, or improvement   Collaborate with multidisciplinary team to address chronic and comorbid conditions and prevent exacerbation or deterioration   Update acute care plan with appropriate goals if chronic or comorbid symptoms are exacerbated and prevent overall improvement and discharge  Taken 8/20/2022 2041  Care Plan - Patient's Chronic Conditions and Co-Morbidity Symptoms are Monitored and Maintained or Improved:   Monitor and assess patient's chronic conditions and comorbid symptoms for stability, deterioration, or improvement   Collaborate with multidisciplinary team to address chronic and comorbid conditions and prevent exacerbation or deterioration   Update acute care plan with appropriate goals if chronic or comorbid symptoms are exacerbated and prevent overall improvement and discharge     Problem: Pain  Goal: Verbalizes/displays adequate comfort level or baseline comfort level  Outcome: Progressing  Flowsheets  Taken 8/21/2022 0157  Verbalizes/displays adequate comfort level or baseline comfort level:   Encourage patient to monitor pain and request assistance   Assess pain using appropriate pain scale   Administer analgesics based on type and severity of pain and evaluate response   Implement non-pharmacological measures as appropriate and evaluate response  Taken 8/20/2022 2041  Verbalizes/displays adequate comfort level or baseline comfort level:   Encourage patient to monitor pain and request assistance   Assess pain using appropriate pain scale   Administer analgesics based on type and severity of pain and evaluate response   Implement non-pharmacological measures as appropriate and evaluate response     Problem: ABCDS Injury Assessment  Goal: Absence of physical injury  Outcome: Progressing  Flowsheets (Taken 8/21/2022 0157)  Absence of Physical Injury: Implement safety measures based on patient assessment     Problem: Respiratory - Adult  Goal: Achieves optimal ventilation and oxygenation  Outcome: Progressing  Flowsheets  Taken 8/21/2022 0157  Achieves optimal ventilation and oxygenation:   Assess for changes in respiratory status   Assess for changes in mentation and behavior   Position to facilitate oxygenation and minimize respiratory effort   Oxygen supplementation based on oxygen saturation or arterial blood gases   Assess the need for suctioning and aspirate as needed   Assess and instruct to report shortness of breath or any respiratory difficulty   Respiratory therapy support as indicated  Taken 8/20/2022 2041  Achieves optimal ventilation and oxygenation:   Assess for changes in respiratory status   Assess for changes in mentation and behavior   Position to facilitate oxygenation and minimize respiratory effort   Oxygen supplementation based on oxygen saturation or arterial blood gases   Assess the need for suctioning and aspirate as needed   Assess and instruct to report shortness of breath or any respiratory difficulty   Respiratory therapy support as indicated     Problem: Cardiovascular - Adult  Goal: Maintains optimal cardiac output and hemodynamic stability  Outcome: Progressing  Flowsheets  Taken 8/21/2022 0157  Maintains optimal cardiac output and hemodynamic stability: Monitor blood pressure and heart rate  Taken 8/20/2022 2041  Maintains optimal cardiac output and hemodynamic stability:   Monitor blood pressure and heart rate   Monitor urine output and notify Licensed Independent Practitioner for values outside of normal range   Assess for signs of decreased cardiac output     Problem: Cardiovascular - Adult  Goal: Absence of cardiac dysrhythmias or at baseline  Outcome: Progressing  Flowsheets  Taken 8/21/2022 0157  Absence of cardiac dysrhythmias or at baseline: Monitor cardiac rate and rhythm  Taken 8/20/2022 2041  Absence of cardiac dysrhythmias or at baseline: Monitor cardiac rate and rhythm     Problem: Skin/Tissue Integrity - Adult  Goal: Skin integrity remains intact  Outcome: Progressing  Flowsheets  Taken 8/21/2022 0157  Skin Integrity Remains Intact: Monitor for areas of redness and/or skin breakdown  Taken 8/20/2022 2041  Skin Integrity Remains Intact: Monitor for areas of redness and/or skin breakdown     Problem: Skin/Tissue Integrity - Adult  Goal: Incisions, wounds, or drain sites healing without S/S of infection  Outcome: Progressing  Flowsheets  Taken 8/21/2022 0157  Incisions, Wounds, or Drain Sites Healing Without Sign and Symptoms of Infection:   ADMISSION and DAILY: Assess and document risk factors for pressure ulcer development   TWICE DAILY: Assess and document skin integrity   TWICE DAILY: Assess and document dressing/incision, wound bed, drain sites and surrounding tissue  Taken 8/20/2022 2041  Incisions, Wounds, or Drain Sites Healing Without Sign and Symptoms of Infection: ADMISSION and DAILY: Assess and document risk factors for pressure ulcer development   TWICE DAILY: Assess and document skin integrity   TWICE DAILY: Assess and document dressing/incision, wound bed, drain sites and surrounding tissue     Problem: Musculoskeletal - Adult  Goal: Return mobility to safest level of function  Outcome: Progressing  Flowsheets  Taken 8/21/2022 0157  Return Mobility to Safest Level of Function:   Assess patient stability and activity tolerance for standing, transferring and ambulating with or without assistive devices   Assist with transfers and ambulation using safe patient handling equipment as needed   Ensure adequate protection for wounds/incisions during mobilization   Obtain physical therapy/occupational therapy consults as needed  Taken 8/20/2022 2041  Return Mobility to Safest Level of Function:   Assess patient stability and activity tolerance for standing, transferring and ambulating with or without assistive devices   Assist with transfers and ambulation using safe patient handling equipment as needed   Ensure adequate protection for wounds/incisions during mobilization   Obtain physical therapy/occupational therapy consults as needed     Problem: Musculoskeletal - Adult  Goal: Maintain proper alignment of affected body part  Outcome: Progressing  Flowsheets  Taken 8/21/2022 0157  Maintain proper alignment of affected body part: Support and protect limb and body alignment per provider's orders  Taken 8/20/2022 2041  Maintain proper alignment of affected body part: Support and protect limb and body alignment per provider's orders     Problem: Gastrointestinal - Adult  Goal: Minimal or absence of nausea and vomiting  Outcome: Progressing  Flowsheets (Taken 8/20/2022 2041)  Minimal or absence of nausea and vomiting:   Administer IV fluids as ordered to ensure adequate hydration   Administer ordered antiemetic medications as needed   Provide nonpharmacologic comfort measures as appropriate     Problem: Gastrointestinal - Adult  Goal: Maintains or returns to baseline bowel function  Outcome: Progressing  Flowsheets  Taken 8/21/2022 0157  Maintains or returns to baseline bowel function:   Assess bowel function   Encourage oral fluids to ensure adequate hydration   Administer IV fluids as ordered to ensure adequate hydration   Administer ordered medications as needed   Encourage mobilization and activity  Taken 8/20/2022 2041  Maintains or returns to baseline bowel function:   Assess bowel function   Encourage oral fluids to ensure adequate hydration   Administer IV fluids as ordered to ensure adequate hydration   Administer ordered medications as needed   Encourage mobilization and activity     Problem: Gastrointestinal - Adult  Goal: Maintains adequate nutritional intake  Outcome: Progressing  Flowsheets (Taken 8/20/2022 2041)  Maintains adequate nutritional intake:   Monitor percentage of each meal consumed   Identify factors contributing to decreased intake, treat as appropriate   Assist with meals as needed   Monitor intake and output, weight and lab values     Problem: Infection - Adult  Goal: Absence of infection at discharge  Outcome: Progressing  Flowsheets (Taken 8/20/2022 2041)  Absence of infection at discharge:   Assess and monitor for signs and symptoms of infection   Monitor lab/diagnostic results   Monitor all insertion sites i.e., indwelling lines, tubes and drains   Administer medications as ordered   Instruct and encourage patient and family to use good hand hygiene technique   Identify and instruct in appropriate isolation precautions for identified infection/condition     Problem: Infection - Adult  Goal: Absence of infection during hospitalization  Outcome: Progressing  Flowsheets  Taken 8/21/2022 0157  Absence of infection during hospitalization:   Assess and monitor for signs and symptoms of infection   Monitor lab/diagnostic results   Monitor all insertion sites i.e., indwelling lines, tubes and drains   Administer medications as ordered   Instruct and encourage patient and family to use good hand hygiene technique   Identify and instruct in appropriate isolation precautions for identified infection/condition  Taken 8/20/2022 2041  Absence of infection during hospitalization:   Assess and monitor for signs and symptoms of infection   Monitor lab/diagnostic results   Monitor all insertion sites i.e., indwelling lines, tubes and drains   Administer medications as ordered   Instruct and encourage patient and family to use good hand hygiene technique   Identify and instruct in appropriate isolation precautions for identified infection/condition     Problem: Infection - Adult  Goal: Absence of fever/infection during anticipated neutropenic period  Outcome: Progressing  Flowsheets (Taken 8/20/2022 2041)  Absence of fever/infection during anticipated neutropenic period: Monitor white blood cell count     Problem: Metabolic/Fluid and Electrolytes - Adult  Goal: Electrolytes maintained within normal limits  Outcome: Progressing  Flowsheets  Taken 8/21/2022 0157  Electrolytes maintained within normal limits:   Monitor labs and assess patient for signs and symptoms of electrolyte imbalances   Administer electrolyte replacement as ordered  Taken 8/20/2022 2041  Electrolytes maintained within normal limits:   Monitor labs and assess patient for signs and symptoms of electrolyte imbalances   Administer electrolyte replacement as ordered   Monitor response to electrolyte replacements, including repeat lab results as appropriate   Fluid restriction as ordered     Problem: Metabolic/Fluid and Electrolytes - Adult  Goal: Hemodynamic stability and optimal renal function maintained  Outcome: Progressing  Flowsheets (Taken 8/20/2022 2041)  Hemodynamic stability and optimal renal function maintained:   Monitor labs and assess for signs and symptoms of volume excess or deficit   Monitor intake, output and patient weight   Monitor urine specific gravity, serum osmolarity and serum sodium as indicated or ordered     Problem: Metabolic/Fluid and Electrolytes - Adult  Goal: Glucose maintained within prescribed range  Outcome: Progressing  Flowsheets  Taken 8/21/2022 0157  Glucose maintained within prescribed range:   Monitor blood glucose as ordered   Assess for signs and symptoms of hyperglycemia and hypoglycemia   Administer ordered medications to maintain glucose within target range   Assess barriers to adequate nutritional intake and initiate nutrition consult as needed  Taken 8/20/2022 2041  Glucose maintained within prescribed range:   Monitor blood glucose as ordered   Assess for signs and symptoms of hyperglycemia and hypoglycemia   Administer ordered medications to maintain glucose within target range    Care plan reviewed with patient and patient's spouse. Patient and patient's spouse verbalized understanding of the plan of care and contributed to goal setting.

## 2022-08-21 NOTE — PROGRESS NOTES
Dandy Ortega was evaluated today for a wheeled walker and she requires this to successfully complete daily living tasks of grooming, bathing, toileting and ambulation. A wheeled walker is necessary due to the patient's unsteady gait, upper & lower body weakness, and inability to  an ambulation device; and she can ambulate only by pushing a walker instead of a lesser assistive device such as a cane, crutch, or standard walker. The need for this equipment was discussed with the patient and she understands and is in agreement.     Jhonatan Hagen PT, DPT

## 2022-08-21 NOTE — PROGRESS NOTES
[address] [address]  INPATIENT PHYSICAL THERAPY  EVALUATION  [facility] ICU STEPDOWN TELEMETRY 4K - 4K-11/011-A    Time In: 4449  Time Out: 1140  Timed Code Treatment Minutes: 13 Minutes  Minutes: 26          Date: 2022  Patient Name: Wilfrido Hummel,  Gender:  female        MRN: [id number]  : 1960  (58 y.o.)      Referring Practitioner: Teodora Bates MD  Diagnosis: CAD, multiple vessel  Additional Pertinent Hx: Per EMR \"58 y.o. diabetic female, no previous history of CAD, originally referred for preoperative clearance for a cervical orthopedic procedure. Abnormal EKG prompted LHC, showing 3v CAD, including severe left main disease, and RCA . Patient reports only chronic progressive fatigue and dyspnea on exertion; denies chest pain, presyncope, or orthopnea. \" Pt s/p CABG X3 on 22. Restrictions/Precautions:  Restrictions/Precautions: Surgical Protocols, General Precautions, Fall Risk  Position Activity Restriction  Sternal Precautions: No Pushing, No Pulling, 5# Lifting Restrictions  Other position/activity restrictions: Swedish speaking-needs , chest tubes x2    Subjective:  Chart Reviewed: Yes  Patient assessed for rehabilitation services?: Yes  Family / Caregiver Present: Yes family and   Subjective: Ok to see pt per nursing. Pt in bedside chair when PT arrived, agreeable to PT session, family present.     General:  Overall Orientation Status: Within Functional Limits  Orientation Level: Oriented X4  Vision: Within Functional Limits  Hearing: Within functional limits       Pain: mild pain reported    Vitals: Oxygen: 3 L of O2, >92% during session  Heart Rate: 110s following gait    Social/Functional History:    Lives With: Spouse  Type of Home: Apartment  Home Layout: One level  Home Access: Level entry  Home Equipment:  (none)     Bathroom Shower/Tub: Tub/Shower unit  Bathroom Toilet: Standard  Bathroom Equipment:  (none)       ADL Assistance: plan of care and goals. Treatment time included skilled education and facilitation of tasks to increase safety and independence with functional mobility for improved independence and quality of life. Assessment: Body Structures, Functions, Activity Limitations Requiring Skilled Therapeutic Intervention: Decreased functional mobility , Decreased endurance, Increased pain, Decreased balance, Decreased strength  Assessment: Pt presents with the deficits above, requires 1 person assist for safety with RW for support. Pt cont to require skilled PT services to increase IND with functional tasks and progress towards PLOF safely. Pt has sternal precautions with cues for safety. Therapy Prognosis: Good    Requires PT Follow-Up: Yes    Discharge Recommendations:  Discharge Recommendations: Continue to assess pending progress, 24 hour supervision or assist, Home with Home health PT, Patient would benefit from continued therapy after discharge    Patient Education:      .     Patient Education  Education Given To: Patient  Education Provided: Role of Therapy, Plan of Care, Precautions, Transfer Training  Education Provided Comments: d/c planning  Education Method: Verbal, Demonstration  Education Outcome: Verbalized understanding, Demonstrated understanding, Continued education needed       Equipment Recommendations:  Equipment Needed: Yes  Mobility Devices: Alease Peat: Rolling    Plan:  Current Treatment Recommendations: Strengthening, Balance training, Gait training, Neuromuscular re-education, Functional mobility training, Transfer training, Endurance training, Safety education & training, Home exercise program, Equipment evaluation, education, & procurement, Patient/Caregiver education & training, Therapeutic activities  Plan:  (6x CABG)    Goals:  Patient goals : return home with   Short Term Goals  Time Frame for Short term goals: by discharge  Short term goal 1: Pt will amb for >100 feet with RW for support and S for safety to progress towards PLOF. Short term goal 2: Pt will demo S for transfers with RW for support with good safety to progress towards PLOF. Short term goal 3: Pt will tolerate 10-20 reps of ther ex to increase overall mobility. Short term goal 4: Pt will demo S for bed mobility tasks with good demo of precautions to progress with overall mobility. Long Term Goals  Time Frame for Long term goals : NA due to short ELOS    Following session, patient left in safe position with all fall risk precautions in place. Back in chair, all needs in reach.

## 2022-08-21 NOTE — PROGRESS NOTES
Cardiovascular Surgery Progress Note    Comfortable on NC  Stable, SR  Labs noted, WBC 25k, hyperglycemia to 220s  CXR LLL atelectasis  Intermittent air leak in pleural pleuri-vac  -Continue IV diuresis  -Check U/A  -Lantus increased  -Keep tubes in

## 2022-08-21 NOTE — FLOWSHEET NOTE
08/21/22 1232   Safe Environment   Safety Measures Other (comment)  (Virtual safety check complete)   Pt sitting in chair with call light in reach, family members at bedside. Family educated on  use of call light. No safety concerns at this time.

## 2022-08-22 ENCOUNTER — APPOINTMENT (OUTPATIENT)
Dept: GENERAL RADIOLOGY | Age: 62
DRG: 166 | End: 2022-08-22
Attending: THORACIC SURGERY (CARDIOTHORACIC VASCULAR SURGERY)
Payer: COMMERCIAL

## 2022-08-22 LAB
ANION GAP SERPL CALCULATED.3IONS-SCNC: 14 MEQ/L (ref 8–16)
BUN BLDV-MCNC: 31 MG/DL (ref 7–22)
CALCIUM SERPL-MCNC: 9.4 MG/DL (ref 8.5–10.5)
CHLORIDE BLD-SCNC: 100 MEQ/L (ref 98–111)
CO2: 28 MEQ/L (ref 23–33)
CREAT SERPL-MCNC: 1 MG/DL (ref 0.4–1.2)
ERYTHROCYTE [DISTWIDTH] IN BLOOD BY AUTOMATED COUNT: 13.3 % (ref 11.5–14.5)
ERYTHROCYTE [DISTWIDTH] IN BLOOD BY AUTOMATED COUNT: 45 FL (ref 35–45)
GFR SERPL CREATININE-BSD FRML MDRD: 56 ML/MIN/1.73M2
GLUCOSE BLD-MCNC: 192 MG/DL (ref 70–108)
GLUCOSE BLD-MCNC: 204 MG/DL (ref 70–108)
GLUCOSE BLD-MCNC: 219 MG/DL (ref 70–108)
GLUCOSE BLD-MCNC: 221 MG/DL (ref 70–108)
GLUCOSE BLD-MCNC: 229 MG/DL (ref 70–108)
HCT VFR BLD CALC: 36.9 % (ref 37–47)
HEMOGLOBIN: 11.7 GM/DL (ref 12–16)
MCH RBC QN AUTO: 29.1 PG (ref 26–33)
MCHC RBC AUTO-ENTMCNC: 31.7 GM/DL (ref 32.2–35.5)
MCV RBC AUTO: 91.8 FL (ref 81–99)
PLATELET # BLD: 208 THOU/MM3 (ref 130–400)
PMV BLD AUTO: 10.4 FL (ref 9.4–12.4)
POTASSIUM SERPL-SCNC: 3.6 MEQ/L (ref 3.5–5.2)
RBC # BLD: 4.02 MILL/MM3 (ref 4.2–5.4)
SODIUM BLD-SCNC: 142 MEQ/L (ref 135–145)
WBC # BLD: 23.4 THOU/MM3 (ref 4.8–10.8)

## 2022-08-22 PROCEDURE — 97116 GAIT TRAINING THERAPY: CPT

## 2022-08-22 PROCEDURE — APPSS30 APP SPLIT SHARED TIME 16-30 MINUTES: Performed by: PHYSICIAN ASSISTANT

## 2022-08-22 PROCEDURE — 6370000000 HC RX 637 (ALT 250 FOR IP): Performed by: THORACIC SURGERY (CARDIOTHORACIC VASCULAR SURGERY)

## 2022-08-22 PROCEDURE — 36415 COLL VENOUS BLD VENIPUNCTURE: CPT

## 2022-08-22 PROCEDURE — 6360000002 HC RX W HCPCS: Performed by: THORACIC SURGERY (CARDIOTHORACIC VASCULAR SURGERY)

## 2022-08-22 PROCEDURE — 2580000003 HC RX 258: Performed by: THORACIC SURGERY (CARDIOTHORACIC VASCULAR SURGERY)

## 2022-08-22 PROCEDURE — 2060000000 HC ICU INTERMEDIATE R&B

## 2022-08-22 PROCEDURE — 80048 BASIC METABOLIC PNL TOTAL CA: CPT

## 2022-08-22 PROCEDURE — 6370000000 HC RX 637 (ALT 250 FOR IP): Performed by: PHYSICIAN ASSISTANT

## 2022-08-22 PROCEDURE — 97530 THERAPEUTIC ACTIVITIES: CPT

## 2022-08-22 PROCEDURE — 97110 THERAPEUTIC EXERCISES: CPT

## 2022-08-22 PROCEDURE — 71045 X-RAY EXAM CHEST 1 VIEW: CPT

## 2022-08-22 PROCEDURE — 85027 COMPLETE CBC AUTOMATED: CPT

## 2022-08-22 PROCEDURE — 82948 REAGENT STRIP/BLOOD GLUCOSE: CPT

## 2022-08-22 RX ORDER — INSULIN GLARGINE 100 [IU]/ML
40 INJECTION, SOLUTION SUBCUTANEOUS 2 TIMES DAILY
Status: DISCONTINUED | OUTPATIENT
Start: 2022-08-22 | End: 2022-08-23

## 2022-08-22 RX ADMIN — INSULIN LISPRO 4 UNITS: 100 INJECTION, SOLUTION INTRAVENOUS; SUBCUTANEOUS at 10:34

## 2022-08-22 RX ADMIN — ATORVASTATIN CALCIUM 40 MG: 40 TABLET, FILM COATED ORAL at 20:04

## 2022-08-22 RX ADMIN — OXYCODONE 10 MG: 5 TABLET ORAL at 16:34

## 2022-08-22 RX ADMIN — POTASSIUM CHLORIDE 20 MEQ: 1500 TABLET, EXTENDED RELEASE ORAL at 10:35

## 2022-08-22 RX ADMIN — ASPIRIN 81 MG: 81 TABLET, COATED ORAL at 09:09

## 2022-08-22 RX ADMIN — FUROSEMIDE 20 MG: 10 INJECTION, SOLUTION INTRAMUSCULAR; INTRAVENOUS at 18:24

## 2022-08-22 RX ADMIN — SENNOSIDES AND DOCUSATE SODIUM 1 TABLET: 50; 8.6 TABLET ORAL at 09:09

## 2022-08-22 RX ADMIN — INSULIN GLARGINE 40 UNITS: 100 INJECTION, SOLUTION SUBCUTANEOUS at 12:55

## 2022-08-22 RX ADMIN — OXYCODONE 10 MG: 5 TABLET ORAL at 09:07

## 2022-08-22 RX ADMIN — OXYCODONE 10 MG: 5 TABLET ORAL at 03:56

## 2022-08-22 RX ADMIN — OXYCODONE 5 MG: 5 TABLET ORAL at 20:50

## 2022-08-22 RX ADMIN — SODIUM CHLORIDE, PRESERVATIVE FREE 10 ML: 5 INJECTION INTRAVENOUS at 09:16

## 2022-08-22 RX ADMIN — AMIODARONE HYDROCHLORIDE 200 MG: 200 TABLET ORAL at 09:09

## 2022-08-22 RX ADMIN — AMIODARONE HYDROCHLORIDE 200 MG: 200 TABLET ORAL at 20:04

## 2022-08-22 RX ADMIN — INSULIN GLARGINE 40 UNITS: 100 INJECTION, SOLUTION SUBCUTANEOUS at 20:49

## 2022-08-22 RX ADMIN — MAGNESIUM CITRATE 296 ML: 1.75 LIQUID ORAL at 16:35

## 2022-08-22 RX ADMIN — FAMOTIDINE 20 MG: 20 TABLET ORAL at 09:07

## 2022-08-22 RX ADMIN — ENOXAPARIN SODIUM 40 MG: 100 INJECTION SUBCUTANEOUS at 09:16

## 2022-08-22 RX ADMIN — SENNOSIDES AND DOCUSATE SODIUM 1 TABLET: 50; 8.6 TABLET ORAL at 20:04

## 2022-08-22 RX ADMIN — FAMOTIDINE 20 MG: 20 TABLET ORAL at 20:04

## 2022-08-22 RX ADMIN — SODIUM CHLORIDE, PRESERVATIVE FREE 10 ML: 5 INJECTION INTRAVENOUS at 20:04

## 2022-08-22 RX ADMIN — Medication 1 TABLET: at 09:09

## 2022-08-22 RX ADMIN — INSULIN LISPRO 4 UNITS: 100 INJECTION, SOLUTION INTRAVENOUS; SUBCUTANEOUS at 18:24

## 2022-08-22 RX ADMIN — OXYCODONE 5 MG: 5 TABLET ORAL at 13:09

## 2022-08-22 RX ADMIN — CLOPIDOGREL BISULFATE 75 MG: 75 TABLET ORAL at 09:07

## 2022-08-22 RX ADMIN — FUROSEMIDE 20 MG: 10 INJECTION, SOLUTION INTRAMUSCULAR; INTRAVENOUS at 09:06

## 2022-08-22 ASSESSMENT — PAIN DESCRIPTION - LOCATION
LOCATION: CHEST;INCISION
LOCATION: CHEST;INCISION
LOCATION: CHEST
LOCATION: CHEST

## 2022-08-22 ASSESSMENT — PAIN DESCRIPTION - FREQUENCY
FREQUENCY: CONTINUOUS

## 2022-08-22 ASSESSMENT — PAIN - FUNCTIONAL ASSESSMENT
PAIN_FUNCTIONAL_ASSESSMENT: ACTIVITIES ARE NOT PREVENTED
PAIN_FUNCTIONAL_ASSESSMENT: PREVENTS OR INTERFERES SOME ACTIVE ACTIVITIES AND ADLS
PAIN_FUNCTIONAL_ASSESSMENT: ACTIVITIES ARE NOT PREVENTED
PAIN_FUNCTIONAL_ASSESSMENT: PREVENTS OR INTERFERES SOME ACTIVE ACTIVITIES AND ADLS

## 2022-08-22 ASSESSMENT — PAIN DESCRIPTION - ORIENTATION
ORIENTATION: MID

## 2022-08-22 ASSESSMENT — PAIN SCALES - GENERAL
PAINLEVEL_OUTOF10: 5
PAINLEVEL_OUTOF10: 5
PAINLEVEL_OUTOF10: 7
PAINLEVEL_OUTOF10: 5
PAINLEVEL_OUTOF10: 6
PAINLEVEL_OUTOF10: 7
PAINLEVEL_OUTOF10: 7
PAINLEVEL_OUTOF10: 5

## 2022-08-22 ASSESSMENT — PAIN DESCRIPTION - ONSET
ONSET: ON-GOING

## 2022-08-22 ASSESSMENT — PAIN DESCRIPTION - PAIN TYPE
TYPE: SURGICAL PAIN

## 2022-08-22 ASSESSMENT — PAIN DESCRIPTION - DESCRIPTORS
DESCRIPTORS: SHARP
DESCRIPTORS: ACHING;DISCOMFORT
DESCRIPTORS: ACHING
DESCRIPTORS: DISCOMFORT

## 2022-08-22 NOTE — PROGRESS NOTES
Comprehensive Nutrition Assessment    Type and Reason for Visit:  Initial, Consult (Post op CABG, Ileus protocol)    Nutrition Recommendations/Plan:   Continue current diet. Send Glucerna  daily. Send [place] daily. Send decaf hot tea  daily  Consider SLP swallow eval as appropriate if swallowing difficulty continues. Send ground meats & sauce on them per pt request.      Malnutrition Assessment:  Malnutrition Status: At risk for malnutrition (Comment) (08/22/22 4408)    Context:  Acute Illness     Findings of the 6 clinical characteristics of malnutrition:  Energy Intake:  Mild decrease in energy intake (Comment) (~3 days)  Weight Loss:  No significant weight loss     Body Fat Loss:  No significant body fat loss     Muscle Mass Loss:  No significant muscle mass loss    Fluid Accumulation:  Mild Extremities   Strength:  Not Performed    Nutrition Assessment:     Pt. nutritionally compromised AEB wounds. At risk for further nutrition compromise r/t increased nutrient needs for wound healing, s/p- CABG x 3  (8/19), Difficulty swallowing meat after open heart surgery, + urine opiates drug screen  underlying medical condition (Hx CAD, GERD, HTN, HLD, Tobacco Abuse, Type II DM). Nutrition Related Findings:    Pt. Report/Treatments/Miscellaneous: [facility] [place] I used green tablet. Pt reports fair appetite, having difficulty swallowing meat since surgery & requesting soft ground up meats with sauce on them  GI Status: Pt reports last BM was 8/18 ( none charted)  Pertinent Labs: (8/22) BUN 31, Glucose 221, POC Glucose 192-204, Hemoglobin 11.7 (8/10) Triglyceride 278, (8/18) Hemoglobin A1C 7  Pertinent Meds: Lasix, Lantus, Humalog, MVI, Senokot S, Zofran    Wound Type: Surgical Incision (CABG x 3 (8/19))       Current Nutrition Intake & Therapies:    Average Meal Intake: 26-50%, 51-75%  Average Supplements Intake:  (new)  ADULT DIET; Regular; 3 carb choices (45 gm/meal);  Low Sodium (2 gm); 2000 ml  ADULT ORAL NUTRITION SUPPLEMENT;, Dinner; Other Oral Supplement; send hot decaf tea at dinner  ADULT ORAL NUTRITION SUPPLEMENT; Breakfast; Diabetic Oral Supplement  ADULT ORAL NUTRITION SUPPLEMENT; Breakfast; Other Oral Supplement; send activia at breakfast    Anthropometric Measures:  Height: 5' 1\" (154.9 cm)  Ideal Body Weight (IBW): 105 lbs (48 kg)    Admission Body Weight: 164 lb 0.4 oz (74.4 kg) ((8/19) no edema)  Current Body Weight: 164 lb 8 oz (74.6 kg) ((8/22) trace RUE, LUE, trace RLE, Trace LLE edema),   IBW. Current BMI (kg/m2): 31.1  Usual Body Weight:  (per pt 150#, per EMR: (8/10) 168#, (6/28) 164#, (19/17/16) 196#)                       BMI Categories: Obese Class 1 (BMI 30.0-34. 9)    Estimated Daily Nutrient Needs:  Energy Requirements Based On: Kcal/kg     Energy (kcal/day): 1343-1492kcals (18-20kcals/kgm)  Weight Used for Protein Requirements: Ideal  Protein (g/day): 58-96 grams (1.2-2 grams protein/kgm IBW)     Fluid (ml/day):  per physician    Nutrition Diagnosis:   Increased nutrient needs related to increase demand for energy/nutrients as evidenced by wounds    Nutrition Interventions:   Food and/or Nutrient Delivery: Continue Current Diet, Start Oral Nutrition Supplement, Vitamin Supplement  Nutrition Education/Counseling: Education completed (Provided heart healthy carb controlled diet education ( using green screen)  & handout in [facility] (8/22))  Coordination of Nutrition Care: Continue to monitor while inpatient   Nutrition Education:  Educated on heart healthy carb controlled diet using green screen/  Learners: Patient  Readiness: Acceptance  Method: Explanation and Handout  Response: Needs Reinforcement pt limited  Contact name and number provided.    Goals:     Goals: PO intake 75% or greater, within 2 days       Nutrition Monitoring and Evaluation:   Behavioral-Environmental Outcomes: None Identified  Food/Nutrient Intake Outcomes: Diet Advancement/Tolerance, Food and Nutrient Intake, Supplement Intake, Vitamin/Mineral Intake       Discharge Planning:     Too soon to determine     Irwin Maradiaga RD, LD  Contact: [phone]

## 2022-08-22 NOTE — PLAN OF CARE
Problem: Discharge Planning  Goal: Discharge to home or other facility with appropriate resources  Outcome: Progressing  Flowsheets  Taken 8/21/2022 0157  Discharge to home or other facility with appropriate resources:   Identify barriers to discharge with patient and caregiver   Arrange for needed discharge resources and transportation as appropriate   Identify discharge learning needs (meds, wound care, etc)  Taken 8/20/2022 2041  Discharge to home or other facility with appropriate resources:   Identify barriers to discharge with patient and caregiver   Arrange for needed discharge resources and transportation as appropriate   Identify discharge learning needs (meds, wound care, etc)     Problem: Chronic Conditions and Co-morbidities  Goal: Patient's chronic conditions and co-morbidity symptoms are monitored and maintained or improved  Outcome: Progressing  Flowsheets  Taken 8/21/2022 0157  Care Plan - Patient's Chronic Conditions and Co-Morbidity Symptoms are Monitored and Maintained or Improved:   Monitor and assess patient's chronic conditions and comorbid symptoms for stability, deterioration, or improvement   Collaborate with multidisciplinary team to address chronic and comorbid conditions and prevent exacerbation or deterioration   Update acute care plan with appropriate goals if chronic or comorbid symptoms are exacerbated and prevent overall improvement and discharge  Taken 8/20/2022 2041  Care Plan - Patient's Chronic Conditions and Co-Morbidity Symptoms are Monitored and Maintained or Improved:   Monitor and assess patient's chronic conditions and comorbid symptoms for stability, deterioration, or improvement   Collaborate with multidisciplinary team to address chronic and comorbid conditions and prevent exacerbation or deterioration   Update acute care plan with appropriate goals if chronic or comorbid symptoms are exacerbated and prevent overall improvement and discharge     Problem: Pain  Goal: Verbalizes/displays adequate comfort level or baseline comfort level  Outcome: Progressing  Flowsheets  Taken 8/21/2022 0157  Verbalizes/displays adequate comfort level or baseline comfort level:   Encourage patient to monitor pain and request assistance   Assess pain using appropriate pain scale   Administer analgesics based on type and severity of pain and evaluate response   Implement non-pharmacological measures as appropriate and evaluate response  Taken 8/20/2022 2041  Verbalizes/displays adequate comfort level or baseline comfort level:   Encourage patient to monitor pain and request assistance   Assess pain using appropriate pain scale   Administer analgesics based on type and severity of pain and evaluate response   Implement non-pharmacological measures as appropriate and evaluate response     Problem: ABCDS Injury Assessment  Goal: Absence of physical injury  Outcome: Progressing  Flowsheets (Taken 8/21/2022 0157)  Absence of Physical Injury: Implement safety measures based on patient assessment     Problem: Respiratory - Adult  Goal: Achieves optimal ventilation and oxygenation  Outcome: Progressing  Flowsheets  Taken 8/21/2022 0157  Achieves optimal ventilation and oxygenation:   Assess for changes in respiratory status   Assess for changes in mentation and behavior   Position to facilitate oxygenation and minimize respiratory effort   Oxygen supplementation based on oxygen saturation or arterial blood gases   Assess the need for suctioning and aspirate as needed   Assess and instruct to report shortness of breath or any respiratory difficulty   Respiratory therapy support as indicated  Taken 8/20/2022 2041  Achieves optimal ventilation and oxygenation:   Assess for changes in respiratory status   Assess for changes in mentation and behavior   Position to facilitate oxygenation and minimize respiratory effort   Oxygen supplementation based on oxygen saturation or arterial blood gases   Assess the need for suctioning and aspirate as needed   Assess and instruct to report shortness of breath or any respiratory difficulty   Respiratory therapy support as indicated     Problem: Cardiovascular - Adult  Goal: Maintains optimal cardiac output and hemodynamic stability  Outcome: Progressing  Flowsheets  Taken 8/21/2022 0157  Maintains optimal cardiac output and hemodynamic stability: Monitor blood pressure and heart rate  Taken 8/20/2022 2041  Maintains optimal cardiac output and hemodynamic stability:   Monitor blood pressure and heart rate   Monitor urine output and notify Licensed Independent Practitioner for values outside of normal range   Assess for signs of decreased cardiac output     Problem: Cardiovascular - Adult  Goal: Absence of cardiac dysrhythmias or at baseline  Outcome: Progressing  Flowsheets  Taken 8/21/2022 0157  Absence of cardiac dysrhythmias or at baseline: Monitor cardiac rate and rhythm  Taken 8/20/2022 2041  Absence of cardiac dysrhythmias or at baseline: Monitor cardiac rate and rhythm     Problem: Skin/Tissue Integrity - Adult  Goal: Skin integrity remains intact  Outcome: Progressing  Flowsheets  Taken 8/21/2022 0157  Skin Integrity Remains Intact: Monitor for areas of redness and/or skin breakdown  Taken 8/20/2022 2041  Skin Integrity Remains Intact: Monitor for areas of redness and/or skin breakdown     Problem: Skin/Tissue Integrity - Adult  Goal: Incisions, wounds, or drain sites healing without S/S of infection  Outcome: Progressing  Flowsheets  Taken 8/21/2022 0157  Incisions, Wounds, or Drain Sites Healing Without Sign and Symptoms of Infection:   ADMISSION and DAILY: Assess and document risk factors for pressure ulcer development   TWICE DAILY: Assess and document skin integrity   TWICE DAILY: Assess and document dressing/incision, wound bed, drain sites and surrounding tissue  Taken 8/20/2022 2041  Incisions, Wounds, or Drain Sites Healing Without Sign and Symptoms of Infection: ADMISSION and DAILY: Assess and document risk factors for pressure ulcer development   TWICE DAILY: Assess and document skin integrity   TWICE DAILY: Assess and document dressing/incision, wound bed, drain sites and surrounding tissue     Problem: Musculoskeletal - Adult  Goal: Return mobility to safest level of function  Outcome: Progressing  Flowsheets  Taken 8/21/2022 0157  Return Mobility to Safest Level of Function:   Assess patient stability and activity tolerance for standing, transferring and ambulating with or without assistive devices   Assist with transfers and ambulation using safe patient handling equipment as needed   Ensure adequate protection for wounds/incisions during mobilization   Obtain physical therapy/occupational therapy consults as needed  Taken 8/20/2022 2041  Return Mobility to Safest Level of Function:   Assess patient stability and activity tolerance for standing, transferring and ambulating with or without assistive devices   Assist with transfers and ambulation using safe patient handling equipment as needed   Ensure adequate protection for wounds/incisions during mobilization   Obtain physical therapy/occupational therapy consults as needed     Problem: Musculoskeletal - Adult  Goal: Maintain proper alignment of affected body part  Outcome: Progressing  Flowsheets  Taken 8/21/2022 0157  Maintain proper alignment of affected body part: Support and protect limb and body alignment per provider's orders  Taken 8/20/2022 2041  Maintain proper alignment of affected body part: Support and protect limb and body alignment per provider's orders     Problem: Gastrointestinal - Adult  Goal: Minimal or absence of nausea and vomiting  Outcome: Progressing  Flowsheets (Taken 8/20/2022 2041)  Minimal or absence of nausea and vomiting:   Administer IV fluids as ordered to ensure adequate hydration   Administer ordered antiemetic medications as needed   Provide nonpharmacologic comfort measures as indwelling lines, tubes and drains   Administer medications as ordered   Instruct and encourage patient and family to use good hand hygiene technique   Identify and instruct in appropriate isolation precautions for identified infection/condition  Taken 8/20/2022 2041  Absence of infection during hospitalization:   Assess and monitor for signs and symptoms of infection   Monitor lab/diagnostic results   Monitor all insertion sites i.e., indwelling lines, tubes and drains   Administer medications as ordered   Instruct and encourage patient and family to use good hand hygiene technique   Identify and instruct in appropriate isolation precautions for identified infection/condition     Problem: Infection - Adult  Goal: Absence of fever/infection during anticipated neutropenic period  Outcome: Progressing  Flowsheets (Taken 8/20/2022 2041)  Absence of fever/infection during anticipated neutropenic period: Monitor white blood cell count     Problem: Metabolic/Fluid and Electrolytes - Adult  Goal: Electrolytes maintained within normal limits  Outcome: Progressing  Flowsheets  Taken 8/21/2022 0157  Electrolytes maintained within normal limits:   Monitor labs and assess patient for signs and symptoms of electrolyte imbalances   Administer electrolyte replacement as ordered  Taken 8/20/2022 2041  Electrolytes maintained within normal limits:   Monitor labs and assess patient for signs and symptoms of electrolyte imbalances   Administer electrolyte replacement as ordered   Monitor response to electrolyte replacements, including repeat lab results as appropriate   Fluid restriction as ordered     Problem: Metabolic/Fluid and Electrolytes - Adult  Goal: Hemodynamic stability and optimal renal function maintained  Outcome: Progressing  Flowsheets (Taken 8/20/2022 2041)  Hemodynamic stability and optimal renal function maintained:   Monitor labs and assess for signs and symptoms of volume excess or deficit   Monitor intake, output and patient weight   Monitor urine specific gravity, serum osmolarity and serum sodium as indicated or ordered     Problem: Metabolic/Fluid and Electrolytes - Adult  Goal: Glucose maintained within prescribed range  Outcome: Progressing  Flowsheets  Taken 8/21/2022 0157  Glucose maintained within prescribed range:   Monitor blood glucose as ordered   Assess for signs and symptoms of hyperglycemia and hypoglycemia   Administer ordered medications to maintain glucose within target range   Assess barriers to adequate nutritional intake and initiate nutrition consult as needed  Taken 8/20/2022 2041  Glucose maintained within prescribed range:   Monitor blood glucose as ordered   Assess for signs and symptoms of hyperglycemia and hypoglycemia   Administer ordered medications to maintain glucose within target range    Care plan reviewed with patient and patient's spouse. Patient and patient's spouse verbalized understanding of the plan of care and contributed to goal setting.

## 2022-08-22 NOTE — PROGRESS NOTES
Inpatient Cardiac Rehabilitation Consult    Received consult for Phase II Cardiac Rehabilitation. Patient needs cardiac rehab due to CABG on 8/19/22. Attempted to see patient for cardiac rehab education. Patient does not speak English and requires use of the  tablet. Tablet stand was not plugged in and needs to be charged. Will re attempt at a later time.

## 2022-08-22 NOTE — PROGRESS NOTES
[facility] ICU STEPDOWN TELEMETRY 4K  Occupational Therapy  Daily Note  Time:   Time In: 5402  Time Out: 1121  Timed Code Treatment Minutes: 23 Minutes  Minutes: 23          Date: 2022  Patient Name: Arminda Vallejo,   Gender: female      Room: [facility]011-A  MRN: [id number]  : 1960  (58 y.o.)  Referring Practitioner: Eyv Dias MD  Diagnosis: CAD, multiple vessel  Additional Pertinent Hx: Per H&P: 58 y.o. diabetic female, no previous history of CAD, originally referred for preoperative clearance for a cervical orthopedic procedure. Abnormal EKG prompted LHC, showing 3v CAD, including severe left main disease, and RCA . Patient reports only chronic progressive fatigue and dyspnea on exertion; denies chest pain, presyncope, or orthopnea. Pt s/p CABG X3 on 22. Restrictions/Precautions:  Restrictions/Precautions: Surgical Protocols, General Precautions, Fall Risk  Position Activity Restriction  Sternal Precautions: No Pushing, No Pulling, 5# Lifting Restrictions  Other position/activity restrictions: Bahamian speaking-needs , chest tubes x2     SUBJECTIVE: Pt laying in bed upon arrival, pt agreeable to OT session, RN gave verbal approval for session     PAIN: 4/10: sternal precautions    Vitals: Nurse checked vitals prior to session    COGNITION: Slow Processing    ADL:   No ADL's completed this session. Grandy Chime BALANCE:  Standing Balance: Contact Guard Assistance. With RW and BUE support on the walker, however not heavy weight bearing and good adherence to sternal precautions    BED MOBILITY:  Supine to Sit: Moderate Assistance with bringing trunk to the EOB and for maintaining sternal precautions    TRANSFERS:  Sit to Stand:  Minimal Assistance. From the EOB  Stand to Sit: Moderate Assistance. Back to the recliner    FUNCTIONAL MOBILITY:  Assistive Device: Rolling Walker  Assist Level:  Contact Guard Assistance.    Distance:  HH distances with 1 standing rest

## 2022-08-22 NOTE — FLOWSHEET NOTE
08/22/22 1020   Safe Environment   Safety Measures Other (comment)  (virtual safety round complete)   Virtual Nurse rounds, pt did not respond to audio, talking heard in room so camera not turned on.

## 2022-08-22 NOTE — CARE COORDINATION
8/22/22, 2:14 PM EDT  DISCHARGE PLANNING EVALUATION:    Fidelia Sepulveda       Admitted: 8/19/2022/ 0538   Hospital day: 3   Location: 4-11/011-A Reason for admit: Coronary artery disease, unspecified vessel or lesion type, unspecified whether angina present, unspecified whether native or transplanted heart [I25.10]  CAD, multiple vessel [I25.10]   PMH:  has a past medical history of CAD in native artery, DDD (degenerative disc disease), Frozen shoulder, GERD (gastroesophageal reflux disease), HTN (hypertension), Hyperlipidemia, Lumbar radiculopathy, Obesity, Osteoarthritis, Tobacco abuse, and Type II or unspecified type diabetes mellitus without mention of complication, not stated as uncontrolled. Procedure:   8/19 3v CABG  8/19 Intubated - 8/19 Extubated  8/22 Chest tubes removed  8/22 CXR:   No significant interval change. Left basilar atelectasis/infiltrate    appears stable. Minimal right basilar atelectasis is again noted     Barriers to Discharge: POD #3. Chest tubes removed today. No BM yet. Afebrile. NSR. Sats 100% on 2L O2 - turned down to 1L O2. Ox4. CR/PT/OT. Dietitian consulted. Dietary ileus prevention protocol. Telemetry, I&O, daily weight, IS, sternal precautions, CT care, wound care, SCDs, ugalde care, ambulate. Amio, asa, lipitor, plavix, lovenox, pepcid, IV lasix 20 mg bid, lantus, SSI, lopressor, prn roxicodone, K+, electrolyte replacement protocols. WBC 23.4, hgb 11.7. PCP: Alon Lozano MD  Readmission Risk Score: 11.6%    Patient Goals/Plan/Treatment Preferences: Spoke with Christine and her sister-in-law; states she lives at home with her  and did not use any DME PTA. She cares for herself independently. She does not have any issues with transportation. She has a PCP, but wants to change to Dr. Jamaica Epps as she speaks Italian.  If it takes a long time to get into her, she is willing to see Wilberto Candelaria and use a  phone/monitor in the interim while she waits. Plan is to return home with  and MICHELLE will be coming to assist while her  is at work. She states she will need a walker and 3-in-1 commode at discharge. New HH. SW on case. Transportation/Food Security/Housekeeping Addressed:  No issues identified. [address] Rebeka Gonzalez office; states [facility] is no longer accepting new patients. Will need to check with patient to see if she has someone else she would like to see, or if plans to stick with Dr. Estela Baltazar for now.

## 2022-08-22 NOTE — PROGRESS NOTES
CT/CV Surgery Progress Note    2022 8:04 AM  Surgeon:  Dr. Michael Lawson     Subjective: Ms. Andrea Trimble is resting comfortably in bed on 3L O2 NC, alert, and in no acute distress. Pt denies chest pressure, SOB, fever,chills, N/V/D.  170 cc past 24 hours from chest tubes with no air leak seen. No BM yet since surgery. Denies any nausea and eating breakfast with no problems. WBC 24.3 and  this morning. I/O last 3 completed shifts: In: 745.1 [P.O.:590; IV Piggyback:155.1]  Out: 3170 [Urine:3300; Chest Tube:250]    Vital Signs: BP (!) 105/59   Pulse (!) 113   Temp 98.3 °F (36.8 °C) (Oral)   Resp 20   Ht 5' 1\" (1.549 m)   Wt 164 lb 8 oz (74.6 kg)   SpO2 94%   BMI 31.08 kg/m²    Temp (24hrs), Av.5 °F (36.9 °C), Min:97.8 °F (36.6 °C), Max:99.6 °F (37.6 °C)    Labs:   CBC:   Recent Labs     22  1350 22  0530 22  0445   WBC 24.2* 20.5* 24.3*   HGB 10.9* 10.7* 10.9*   HCT 33.3* 33.4* 35.2*   MCV 91.7 90.3 92.6   * 115* 135   INR 1.35* 1.16*  --      BMP:   Recent Labs     22  1350 22  1535 22  1845 22  1945 22  0530 22  0619 22  0445 22  0717 22  0749     --   --   --  140  --  139  --   --    K 3.6  3.6  --  4.8  --  4.4  --  4.1  --   --      --   --   --  107  --  104  --   --    CO2 20*  --   --   --  27  --  25  --   --    BUN 15  --   --   --  14  --  19  --   --    CREATININE 0.9  --   --   --  0.8  --  0.9  --   --    MG 2.8*  --   --   --  1.9  --   --   --   --    POCGLU  --    < >  --    < >  --    < >  --    < > 204*    < > = values in this interval not displayed. Imaging:  Chest X-Ray: 2022   1. No significant interval change. Left basilar atelectasis/infiltrate    appears stable. Minimal right basilar atelectasis is again noted.        Intake/Output Summary (Last 24 hours) at 2022 0804  Last data filed at 2022 0349  Gross per 24 hour   Intake 525.07 ml   Output 1820 ml   Net -1294.93 ml     Scheduled Meds:    insulin glargine  30 Units SubCUTAneous BID    metoprolol tartrate  50 mg Oral BID    furosemide  20 mg IntraVENous BID    insulin lispro  0-16 Units SubCUTAneous TID WC    insulin lispro  0-4 Units SubCUTAneous Nightly    aspirin  81 mg Oral Daily    clopidogrel  75 mg Oral Daily    famotidine  20 mg Oral BID    sodium chloride flush  5-40 mL IntraVENous 2 times per day    enoxaparin  40 mg SubCUTAneous Daily    amiodarone  200 mg Oral BID    multivitamin  1 tablet Oral Daily with breakfast    sennosides-docusate sodium  1 tablet Oral BID    atorvastatin  40 mg Oral Nightly     ROS: All neg unless specifically mentioned in subjective section. Exam:  General Appearance: alert ,conversing, in no acute distress  Cardiovascular: normal rate, regular rhythm, normal S1 and S2, no murmurs, rubs, clicks, or gallops  Pulmonary/Chest: clear to auscultation bilaterally- no wheezes, rales or rhonchi, normal air movement, no respiratory distress  Neurological: alert, oriented, normal speech, no focal findings or movement disorder noted  Sternum: Incision healing appropriately and no wound dehiscence noted.      Assessment:   Patient Active Problem List   Diagnosis    GERD (gastroesophageal reflux disease)    Acquired spondylolisthesis    Spinal stenosis, lumbar region, with neurogenic claudication    Tobacco abuse    Arthralgia of right hand    HTN (hypertension)    Type 1 diabetes mellitus (HCC)    Insomnia    Hyperlipidemia    Onychomycosis of toenail    Preoperative clearance    CAD in native artery    Abnormal stress test    Other fatigue    CAD, multiple vessel    S/P CABG x 3     Plan:   CXR reviewed- Continue daily CXR's   Continue current therapy    The plan of care was discussed in detail with Dr. Cyndra Nageotte     Electronically signed by Lilliana Stone PA-C on 8/22/2022 at 8:04 AM

## 2022-08-22 NOTE — CARE COORDINATION
DISCHARGE/PLANNING EVALUATION  8/22/22, 2:18 PM EDT    Reason for Referral:  Discharge planning post OHS. Mental Status:  Alert and oriented. Decision Making:  makes own decisions. Family/Social/Home Environment:  Assessment completed with pt and daughter in law via interpretor. Current Services including food security, transportation and housekeeping:  assessment completed with pt and her daughter in law, pt lives with her  and plans to return home there at discharge. Pt states daughter in law will staying with her also. Pt and  both independent pta. Current Equipment: No dme reported. [facility] [employer] Concerns or Barriers to Discharge:  no barriers reported. Post acute provider list with quality measures, geographic area and applicable managed care information provided. Questions regarding selection process answered: List offered, pt requested [facility]    Teach Back Method used with pt regarding care plan and  discharge planner. Patient verbalized understanding of the plan of care and contribute to goal setting. Patient goals, treatment preferences and discharge plan:  pt planning home with family providing 24 hr x 2 weeks. New [facility], referral made.      Electronically signed by KYLIE Forde on 8/22/2022 at 2:18 PM

## 2022-08-22 NOTE — PROGRESS NOTES
[address] [address]  INPATIENT PHYSICAL THERAPY  DAILY NOTE  [facility] ICU STEPDOWN TELEMETRY 4K - 4K-11/011-A    Time In: 8232  Time Out: 1335  Timed Code Treatment Minutes: 30 Minutes  Minutes: 30          Date: 2022  Patient Name: Yvette Sinha,  Gender:  female        MRN: [id number]  : 1960  (58 y.o.)     Referring Practitioner: Dee Wright MD  Diagnosis: CAD, multiple vessel  Additional Pertinent Hx: Per EMR \"58 y.o. diabetic female, no previous history of CAD, originally referred for preoperative clearance for a cervical orthopedic procedure. Abnormal EKG prompted LHC, showing 3v CAD, including severe left main disease, and RCA . Patient reports only chronic progressive fatigue and dyspnea on exertion; denies chest pain, presyncope, or orthopnea. \" Pt s/p CABG X3 on 22. Prior Level of Function:  Lives With: Spouse  Type of Home: Apartment  Home Layout: One level  Home Access: Level entry  Home Equipment:  (none)   Bathroom Shower/Tub: Tub/Shower unit  Bathroom Toilet: Standard  Bathroom Equipment:  (none)    ADL Assistance: Independent  Homemaking Assistance: Independent  Ambulation Assistance: Independent  Transfer Assistance: Independent  Active : Yes  Additional Comments: Sister will be staying with pt at discharge when  is at work. Pt amb with no AD prior and was very active and IND. Restrictions/Precautions:  Restrictions/Precautions: Surgical Protocols, General Precautions, Fall Risk  Position Activity Restriction  Sternal Precautions: No Pushing, No Pulling, 5# Lifting Restrictions  Other position/activity restrictions: Greek speaking-needs , chest tubes x2     SUBJECTIVE: RN approved session. Patient in chair at arrival and agreeable to therapy. Multiple family members present throughout session and supportive. Patient requested to return to bed. PAIN: 7/10: incision site.      Vitals: Oxygen: 96-98% on 2L   Heart Rate: 90's with all mobility. OBJECTIVE:  Bed Mobility:  Sit to Supine: Moderate Assistance, X 1, with head of bed flat, with verbal cues      Transfers:  Sit to Stand: [employer], cues for hand placement  Stand to [address] [address], cues for hand placement  *Cues to maintain sternal precautions. Ambulation:  Contact Guard Assistance  Distance: 3' to bed  Surface: Level Tile  Device:Rolling Walker  Gait Deviations:  Slow Sammie, Decreased Step Length Bilaterally, and Decreased Gait Speed    Exercise:  Patient was guided in 1 set(s) 10 reps of exercise to both lower extremities. Ankle pumps, Upper trunk rotations, Shoulder horizontal abduction/adduction, Shoulder rolls, Seated marches, Seated hamstring curls, Seated heel/toe raises, and Long arc quads. Multiple rest breaks needed to complete exercises due to fatigue. Exercises were completed for increased independence with functional mobility. Functional Outcome Measures: Completed  -PAC Inpatient Mobility without Stair Climbing Raw Score : 13  -PAC Inpatient without Stair Climbing T-Scale Score : 38.96    ASSESSMENT:  Assessment: Patient progressing toward established goals. Activity Tolerance:  Patient tolerance of  treatment: good.       Equipment Recommendations:Equipment Needed: Yes  Mobility Devices: Marsha Monett: Rolling  Discharge Recommendations: Continue to assess pending progress, 24 hour assistance or supervision, Home with Home Health PT, and Patient would benefit from continued PT at discharge  Plan: Current Treatment Recommendations: Strengthening, Balance training, Gait training, Neuromuscular re-education, Functional mobility training, Transfer training, Endurance training, Safety education & training, Home exercise program, Equipment evaluation, education, & procurement, Patient/Caregiver education & training, Therapeutic activities  Plan:  (6x CABG)    Patient Education  Patient Education: Plan of Care, Precautions/Restrictions, Family Education, [employer] Mobility, Transfers, Gait, Verbal Exercise Instruction    Goals:  Patient goals : return home with   Short Term Goals  Time Frame for Short term goals: by discharge  Short term goal 1: Pt will amb for >100 feet with RW for support and S for safety to progress towards PLOF. Short term goal 2: Pt will demo S for transfers with RW for support with good safety to progress towards PLOF. Short term goal 3: Pt will tolerate 10-20 reps of ther ex to increase overall mobility. Short term goal 4: Pt will demo S for bed mobility tasks with good demo of precautions to progress with overall mobility. Long Term Goals  Time Frame for Long term goals : NA due to short ELOS    Following session, patient left in safe position with all fall risk precautions in place.

## 2022-08-23 ENCOUNTER — APPOINTMENT (OUTPATIENT)
Dept: GENERAL RADIOLOGY | Age: 62
DRG: 166 | End: 2022-08-23
Attending: THORACIC SURGERY (CARDIOTHORACIC VASCULAR SURGERY)
Payer: COMMERCIAL

## 2022-08-23 LAB
ANION GAP SERPL CALCULATED.3IONS-SCNC: 11 MEQ/L (ref 8–16)
BASOPHILS # BLD: 0.3 %
BASOPHILS ABSOLUTE: 0.1 THOU/MM3 (ref 0–0.1)
BUN BLDV-MCNC: 40 MG/DL (ref 7–22)
CALCIUM SERPL-MCNC: 8.6 MG/DL (ref 8.5–10.5)
CHLORIDE BLD-SCNC: 102 MEQ/L (ref 98–111)
CO2: 30 MEQ/L (ref 23–33)
CREAT SERPL-MCNC: 1.2 MG/DL (ref 0.4–1.2)
EOSINOPHIL # BLD: 1 %
EOSINOPHILS ABSOLUTE: 0.2 THOU/MM3 (ref 0–0.4)
ERYTHROCYTE [DISTWIDTH] IN BLOOD BY AUTOMATED COUNT: 13.5 % (ref 11.5–14.5)
ERYTHROCYTE [DISTWIDTH] IN BLOOD BY AUTOMATED COUNT: 45.6 FL (ref 35–45)
GFR SERPL CREATININE-BSD FRML MDRD: 45 ML/MIN/1.73M2
GLUCOSE BLD-MCNC: 115 MG/DL (ref 70–108)
GLUCOSE BLD-MCNC: 123 MG/DL (ref 70–108)
GLUCOSE BLD-MCNC: 139 MG/DL (ref 70–108)
GLUCOSE BLD-MCNC: 145 MG/DL (ref 70–108)
GLUCOSE BLD-MCNC: 210 MG/DL (ref 70–108)
HCT VFR BLD CALC: 32.3 % (ref 37–47)
HEMOGLOBIN: 10.2 GM/DL (ref 12–16)
IMMATURE GRANS (ABS): 0.1 THOU/MM3 (ref 0–0.07)
IMMATURE GRANULOCYTES: 0.5 %
LYMPHOCYTES # BLD: 20.4 %
LYMPHOCYTES ABSOLUTE: 4.1 THOU/MM3 (ref 1–4.8)
MCH RBC QN AUTO: 29.1 PG (ref 26–33)
MCHC RBC AUTO-ENTMCNC: 31.6 GM/DL (ref 32.2–35.5)
MCV RBC AUTO: 92 FL (ref 81–99)
MONOCYTES # BLD: 7.1 %
MONOCYTES ABSOLUTE: 1.4 THOU/MM3 (ref 0.4–1.3)
NUCLEATED RED BLOOD CELLS: 0 /100 WBC
PLATELET # BLD: 249 THOU/MM3 (ref 130–400)
PMV BLD AUTO: 10.1 FL (ref 9.4–12.4)
POTASSIUM SERPL-SCNC: 3.9 MEQ/L (ref 3.5–5.2)
RBC # BLD: 3.51 MILL/MM3 (ref 4.2–5.4)
SEG NEUTROPHILS: 70.7 %
SEGMENTED NEUTROPHILS ABSOLUTE COUNT: 14.2 THOU/MM3 (ref 1.8–7.7)
SODIUM BLD-SCNC: 143 MEQ/L (ref 135–145)
WBC # BLD: 20.1 THOU/MM3 (ref 4.8–10.8)

## 2022-08-23 PROCEDURE — 6360000002 HC RX W HCPCS: Performed by: THORACIC SURGERY (CARDIOTHORACIC VASCULAR SURGERY)

## 2022-08-23 PROCEDURE — 97110 THERAPEUTIC EXERCISES: CPT

## 2022-08-23 PROCEDURE — 97535 SELF CARE MNGMENT TRAINING: CPT

## 2022-08-23 PROCEDURE — 36415 COLL VENOUS BLD VENIPUNCTURE: CPT

## 2022-08-23 PROCEDURE — 80048 BASIC METABOLIC PNL TOTAL CA: CPT

## 2022-08-23 PROCEDURE — 85025 COMPLETE CBC W/AUTO DIFF WBC: CPT

## 2022-08-23 PROCEDURE — 97116 GAIT TRAINING THERAPY: CPT

## 2022-08-23 PROCEDURE — 82948 REAGENT STRIP/BLOOD GLUCOSE: CPT

## 2022-08-23 PROCEDURE — 6370000000 HC RX 637 (ALT 250 FOR IP): Performed by: THORACIC SURGERY (CARDIOTHORACIC VASCULAR SURGERY)

## 2022-08-23 PROCEDURE — 2060000000 HC ICU INTERMEDIATE R&B

## 2022-08-23 PROCEDURE — 2580000003 HC RX 258: Performed by: PHYSICIAN ASSISTANT

## 2022-08-23 PROCEDURE — 71045 X-RAY EXAM CHEST 1 VIEW: CPT

## 2022-08-23 PROCEDURE — 2580000003 HC RX 258: Performed by: THORACIC SURGERY (CARDIOTHORACIC VASCULAR SURGERY)

## 2022-08-23 RX ORDER — FUROSEMIDE 20 MG/1
20 TABLET ORAL DAILY
Status: DISCONTINUED | OUTPATIENT
Start: 2022-08-24 | End: 2022-08-24 | Stop reason: HOSPADM

## 2022-08-23 RX ORDER — INSULIN GLARGINE 100 [IU]/ML
50 INJECTION, SOLUTION SUBCUTANEOUS 2 TIMES DAILY
Status: DISCONTINUED | OUTPATIENT
Start: 2022-08-23 | End: 2022-08-24 | Stop reason: HOSPADM

## 2022-08-23 RX ORDER — SODIUM CHLORIDE 9 MG/ML
INJECTION, SOLUTION INTRAVENOUS ONCE
Status: COMPLETED | OUTPATIENT
Start: 2022-08-23 | End: 2022-08-24

## 2022-08-23 RX ORDER — AMIODARONE HYDROCHLORIDE 200 MG/1
200 TABLET ORAL DAILY
Status: DISCONTINUED | OUTPATIENT
Start: 2022-08-23 | End: 2022-08-24 | Stop reason: HOSPADM

## 2022-08-23 RX ADMIN — ATORVASTATIN CALCIUM 40 MG: 40 TABLET, FILM COATED ORAL at 21:29

## 2022-08-23 RX ADMIN — CLOPIDOGREL BISULFATE 75 MG: 75 TABLET ORAL at 10:18

## 2022-08-23 RX ADMIN — Medication 1 TABLET: at 10:18

## 2022-08-23 RX ADMIN — FAMOTIDINE 20 MG: 20 TABLET ORAL at 21:29

## 2022-08-23 RX ADMIN — INSULIN GLARGINE 50 UNITS: 100 INJECTION, SOLUTION SUBCUTANEOUS at 21:34

## 2022-08-23 RX ADMIN — OXYCODONE 10 MG: 5 TABLET ORAL at 21:29

## 2022-08-23 RX ADMIN — ENOXAPARIN SODIUM 40 MG: 100 INJECTION SUBCUTANEOUS at 10:19

## 2022-08-23 RX ADMIN — SENNOSIDES AND DOCUSATE SODIUM 1 TABLET: 50; 8.6 TABLET ORAL at 21:29

## 2022-08-23 RX ADMIN — SODIUM CHLORIDE, PRESERVATIVE FREE 10 ML: 5 INJECTION INTRAVENOUS at 10:26

## 2022-08-23 RX ADMIN — SENNOSIDES AND DOCUSATE SODIUM 1 TABLET: 50; 8.6 TABLET ORAL at 10:18

## 2022-08-23 RX ADMIN — OXYCODONE 10 MG: 5 TABLET ORAL at 00:56

## 2022-08-23 RX ADMIN — AMIODARONE HYDROCHLORIDE 200 MG: 200 TABLET ORAL at 11:27

## 2022-08-23 RX ADMIN — SODIUM CHLORIDE, PRESERVATIVE FREE 10 ML: 5 INJECTION INTRAVENOUS at 21:28

## 2022-08-23 RX ADMIN — OXYCODONE 10 MG: 5 TABLET ORAL at 11:55

## 2022-08-23 RX ADMIN — SODIUM CHLORIDE: 9 INJECTION, SOLUTION INTRAVENOUS at 11:53

## 2022-08-23 RX ADMIN — ASPIRIN 81 MG: 81 TABLET, COATED ORAL at 10:18

## 2022-08-23 RX ADMIN — FAMOTIDINE 20 MG: 20 TABLET ORAL at 10:18

## 2022-08-23 RX ADMIN — OXYCODONE 10 MG: 5 TABLET ORAL at 17:28

## 2022-08-23 RX ADMIN — INSULIN GLARGINE 50 UNITS: 100 INJECTION, SOLUTION SUBCUTANEOUS at 10:20

## 2022-08-23 RX ADMIN — OXYCODONE 5 MG: 5 TABLET ORAL at 06:52

## 2022-08-23 ASSESSMENT — PAIN DESCRIPTION - ONSET: ONSET: ON-GOING

## 2022-08-23 ASSESSMENT — PAIN SCALES - GENERAL
PAINLEVEL_OUTOF10: 7
PAINLEVEL_OUTOF10: 1
PAINLEVEL_OUTOF10: 7
PAINLEVEL_OUTOF10: 8
PAINLEVEL_OUTOF10: 0
PAINLEVEL_OUTOF10: 5

## 2022-08-23 ASSESSMENT — PAIN DESCRIPTION - LOCATION
LOCATION: RIB CAGE
LOCATION: CHEST;INCISION
LOCATION: RIB CAGE

## 2022-08-23 ASSESSMENT — PAIN DESCRIPTION - ORIENTATION: ORIENTATION: MID

## 2022-08-23 ASSESSMENT — PAIN DESCRIPTION - DESCRIPTORS
DESCRIPTORS: ACHING;DISCOMFORT

## 2022-08-23 ASSESSMENT — PAIN DESCRIPTION - PAIN TYPE: TYPE: SURGICAL PAIN

## 2022-08-23 ASSESSMENT — PAIN DESCRIPTION - FREQUENCY: FREQUENCY: CONTINUOUS

## 2022-08-23 ASSESSMENT — PAIN - FUNCTIONAL ASSESSMENT
PAIN_FUNCTIONAL_ASSESSMENT: ACTIVITIES ARE NOT PREVENTED
PAIN_FUNCTIONAL_ASSESSMENT: PREVENTS OR INTERFERES SOME ACTIVE ACTIVITIES AND ADLS
PAIN_FUNCTIONAL_ASSESSMENT: ACTIVITIES ARE NOT PREVENTED

## 2022-08-23 NOTE — FLOWSHEET NOTE
08/23/22 1352   Safe Environment   Safety Measures Other (comment)  (virtual safety round complete)   Virtual Nurse rounds, pt did not respond to audio, talking heard in room so camera not turned on.

## 2022-08-23 NOTE — PROGRESS NOTES
Cardiovascular Surgery Progress Note    Comfortable on 1L NC  Stable, SR  CXR mild interstitial edema  WBC down to 20k, U/A negative  BUN/Cr 40/1.2; glucose low 200s  -Lasix to PO  -Lantus to 50 bid  -Amiodarone to qd  -Wean O2/ambulate

## 2022-08-23 NOTE — PROGRESS NOTES
[facility]  Occupational Therapy  Daily Note  Time:   Time In:   Time Out: 58  Timed Code Treatment Minutes: 29 Minutes  Minutes: 29          Date: 2022  Patient Name: Alejandrina Hinson,   Gender: female      Room: [facility]011-A  MRN: [id number]  : 1960  (58 y.o.)  Referring Practitioner: Saida Fontenot MD  Diagnosis: CAD, multiple vessel  Additional Pertinent Hx: Per H&P: 58 y.o. diabetic female, no previous history of CAD, originally referred for preoperative clearance for a cervical orthopedic procedure. Abnormal EKG prompted LHC, showing 3v CAD, including severe left main disease, and RCA . Patient reports only chronic progressive fatigue and dyspnea on exertion; denies chest pain, presyncope, or orthopnea. Pt s/p CABG X3 on 22. Restrictions/Precautions:  Restrictions/Precautions: Surgical Protocols, General Precautions, Fall Risk  Position Activity Restriction  Sternal Precautions: No Pushing, No Pulling, 5# Lifting Restrictions  Other position/activity restrictions: Turkish speaking-needs , chest tubes x2     SUBJECTIVE: Nurse approved Tx. Pt agreeable to Tx. Pt required verbal reminders of sternal precaution for no lift, pushing or pulling. Pt verbalized understanding. PAIN: denies pain this day    Vitals: Vitals not assessed per clinical judgement, see nursing flowsheet    COGNITION: WNL    ADL:   Grooming: Stand By Assistance and [employer]. Tolerated static standing and dynamic stand  x7min with 0 LOB engaged in oral hygiene and with 0 UE support. Chris Udall BALANCE:  Sitting Balance:  Stand By Assistance, [employer]. Sitting edge of chair unsupported completing CABG BUE exs. Standing Balance: Stand By Assistance, Contact Guard Assistance. 0 LOB  during static and dynamic standing x7min. BED MOBILITY:  Not Tested    TRANSFERS:  Sit to Stand:  Contact Guard Assistance.     Stand to Sit: Contact Guard Assistance. FUNCTIONAL MOBILITY:  Assistive Device: None  Assist Level:  Contact Guard Assistance. Distance: To and from bathroom  0 LOB. Refused us of 2ww. States she does not use one at home. ADDITIONAL ACTIVITIES:  Engaged in [address] seated CABG ex's, 1x10 each with RB's between each set to increase overall strength and activity tolerance for ADLs. ASSESSMENT:     Activity Tolerance:  Patient tolerance of  treatment: good. Discharge Recommendations: Home with Home Health OT  Equipment Recommendations: Other: will continue to monitor pending progress; may benefit from shower chair/tub transfer bench  Plan: Times per Week: 6x  Current Treatment Recommendations: ROM, Balance training, Functional mobility training, Safety education & training, Patient/Caregiver education & training, Equipment evaluation, education, & procurement, Self-Care / ADL, Endurance training    Patient Education  Patient Education: Role of OT, Plan of Care, ADL's, and Precautions    Goals  Short Term Goals  Time Frame for Short term goals: by discharge  Short Term Goal 1: Pt will increase activity tolerance for functional mobility household distances with supervision in prep for ADL completion. Short Term Goal 2: Pt will complete BADL tasks with supervision to increase independence with self care tasks. Short Term Goal 3: Pt will tolerate dynamic standing X5 minutes with supervision in prep for sinkside grooming/showering tasks. Short Term Goal 4: Pt will complete functional transfers with supervision in prep for toilet and tub/shower transfers. Short Term Goal 5: Pt will tolerate CABG step II/III exercises X10-15 reps to increase overall strength/endurance needed for ADL completion. Following session, patient left in safe position with all fall risk precautions in place.

## 2022-08-23 NOTE — PLAN OF CARE
Cardiovascular - Adult  Goal: Absence of cardiac dysrhythmias or at baseline  Outcome: Progressing  Flowsheets (Taken 8/23/2022 1000)  Absence of cardiac dysrhythmias or at baseline:   Monitor cardiac rate and rhythm   Assess for signs of decreased cardiac output     Problem: Skin/Tissue Integrity - Adult  Goal: Skin integrity remains intact  Outcome: Progressing  Flowsheets    Problem: Skin/Tissue Integrity - Adult  Goal: Skin integrity remains intact  Outcome: Progressing  Flowsheets    Problem: Musculoskeletal - Adult  Goal: Maintain proper alignment of affected body part  Outcome: Progressing  Flowsheets (Taken 8/23/2022 1000)  Maintain proper alignment of affected body part: Support and protect limb and body alignment per provider's orders     Problem: Gastrointestinal - Adult  Goal: Minimal or absence of nausea and vomiting  Outcome: Progressing    Problem: Infection - Adult  Goal: Absence of infection at discharge  Outcome: Progressing  Flowsheets (Taken 8/23/2022 1000)  Absence of infection at discharge:   Assess and monitor for signs and symptoms of infection   Monitor lab/diagnostic results   Administer medications as ordered     Problem: Infection - Adult  Goal: Absence of infection during hospitalization  Outcome: Progressing  Flowsheets (Taken 8/23/2022 1000)  Absence of infection during hospitalization:   Assess and monitor for signs and symptoms of infection   Monitor lab/diagnostic results   Administer medications as ordered     Problem: Infection - Adult  Goal: Absence of fever/infection during anticipated neutropenic period  Outcome: Progressing  Flowsheets (Taken 8/23/2022 1000)  Absence of fever/infection during anticipated neutropenic period:   Monitor white blood cell count   Administer growth factors as ordered     Problem: Metabolic/Fluid and Electrolytes - Adult  Goal: Electrolytes maintained within normal limits  Outcome: Progressing  Flowsheets (Taken 8/23/2022 1000)  Electrolytes maintained within normal limits: Monitor labs and assess patient for signs and symptoms of electrolyte imbalances     Problem: Metabolic/Fluid and Electrolytes - Adult  Goal: Hemodynamic stability and optimal renal function maintained  Outcome: Progressing  Flowsheets (Taken 8/23/2022 1000)  Hemodynamic stability and optimal renal function maintained:   Monitor labs and assess for signs and symptoms of volume excess or deficit   Monitor intake, output and patient weight   Monitor response to interventions for patient's volume status, including labs, urine output, blood pressure (other measures as available)     Problem: Metabolic/Fluid and Electrolytes - Adult  Goal: Glucose maintained within prescribed range  Outcome: Progressing  Flowsheets (Taken 8/23/2022 1000)  Glucose maintained within prescribed range:   Monitor blood glucose as ordered   Assess for signs and symptoms of hyperglycemia and hypoglycemia   Assess barriers to adequate nutritional intake and initiate nutrition consult as needed     Problem: Safety - Adult  Goal: Free from fall injury  Outcome: Progressing    Problem: Skin/Tissue Integrity  Goal: Absence of new skin breakdown  Description: 1. Monitor for areas of redness and/or skin breakdown  2. Assess vascular access sites hourly  3. Every 4-6 hours minimum:  Change oxygen saturation probe site  4. Every 4-6 hours:  If on nasal continuous positive airway pressure, respiratory therapy assess nares and determine need for appliance change or resting period. Outcome: Progressing  Note: Frequent repositioning      Problem: Nutrition Deficit:  Goal: Optimize nutritional status  Outcome: Progressing  Flowsheets (Taken 8/23/2022 1224)  Nutrient intake appropriate for improving, restoring, or maintaining nutritional needs:   Assess nutritional status and recommend course of action   Monitor oral intake, labs, and treatment plans     Care plan reviewed with patient.   Patient verbalizes understanding of the plan of care and contributes to goal setting.

## 2022-08-23 NOTE — PROGRESS NOTES
[facility]  INPATIENT PHYSICAL THERAPY  DAILY NOTE  [facility] ICU STEPDOWN TELEMETRY 4K - 4K-11/011-A    Time In: 1106  Time Out: 1140  Timed Code Treatment Minutes: 34 Minutes  Minutes: 34          Date: 2022  Patient Name: Dory Osei,  Gender:  female        MRN: [id number]  : 1960  (58 y.o.)     Referring Practitioner: Michell Saul MD  Diagnosis: CAD, multiple vessel  Additional Pertinent Hx: Per EMR \"58 y.o. diabetic female, no previous history of CAD, originally referred for preoperative clearance for a cervical orthopedic procedure. Abnormal EKG prompted LHC, showing 3v CAD, including severe left main disease, and RCA . Patient reports only chronic progressive fatigue and dyspnea on exertion; denies chest pain, presyncope, or orthopnea. \" Pt s/p CABG X3 on 22. Prior Level of Function:  Lives With: Spouse  Type of Home: Apartment  Home Layout: One level  Home Access: Level entry  Home Equipment:  (none)   Bathroom Shower/Tub: Tub/Shower unit  Bathroom Toilet: Standard  Bathroom Equipment:  (none)    ADL Assistance: Independent  Homemaking Assistance: Independent  Ambulation Assistance: Independent  Transfer Assistance: Independent  Active : Yes  Additional Comments: Sister will be staying with pt at discharge when  is at work. Pt amb with no AD prior and was very active and IND. Restrictions/Precautions:  Restrictions/Precautions: Surgical Protocols, General Precautions, Fall Risk  Position Activity Restriction  Sternal Precautions: No Pushing, No Pulling, 5# Lifting Restrictions  Other position/activity restrictions: Kazakh speaking-needs      SUBJECTIVE: RN approved session, pt is seated in recliner, sister-in-law present, agreeable to PT.     PAIN: 7/10:     Vitals: Vitals not assessed per clinical judgement, see nursing flowsheet    OBJECTIVE:  Bed Mobility:  Not Tested    Transfers:  Sit to Stand: [employer], X 1, with verbal cues  Stand to Sit:Contact Guard Assistance, X 1, with verbal cues    Ambulation:  Contact Guard Assistance  Distance: 90 feet  Surface: Level Tile  Device:Rolling Walker  Gait Deviations: Forward Flexed Posture, Slow Sammie, Decreased Step Length Bilaterally, and Decreased Gait Speed  **O2 sats 87% on 4 L during ambulation, increased to 5 L to keep sats in mid 90's, pt denies lightheadedness/dizziness when up    Exercise:  Patient was guided in 1 set(s) 10 reps of exercise to both lower extremities. Ankle pumps, Upper trunk rotations, Shoulder horizontal abduction/adduction, Shoulder rolls, and Long arc quads. Exercises were completed for increased independence with functional mobility. Functional Outcome Measures: Not completed       ASSESSMENT:  Assessment: Patient progressing toward established goals. Activity Tolerance:  Patient tolerance of  treatment: good. Equipment Recommendations:Equipment Needed: Yes  Mobility Devices: Miguel Simpers: Rolling  Discharge Recommendations: 24 hour assistance or supervision and Home with Home Health PT  Plan: Current Treatment Recommendations: Strengthening, Balance training, Gait training, Neuromuscular re-education, Functional mobility training, Transfer training, Endurance training, Safety education & training, Home exercise program, Equipment evaluation, education, & procurement, Patient/Caregiver education & training, Therapeutic activities  Plan:  (6x CABG)    Patient Education  Patient Education: Precautions/Restrictions, Transfers, Gait    Goals:  Patient goals : return home with   Short Term Goals  Time Frame for Short term goals: by discharge  Short term goal 1: Pt will amb for >100 feet with RW for support and S for safety to progress towards PLOF. Short term goal 2: Pt will demo S for transfers with RW for support with good safety to progress towards PLOF.   Short term goal 3: Pt will tolerate 10-20 reps of ther ex to increase overall mobility. Short term goal 4: Pt will demo S for bed mobility tasks with good demo of precautions to progress with overall mobility. Long Term Goals  Time Frame for Long term goals : NA due to short ELOS    Following session, patient left in safe position with all fall risk precautions in place.

## 2022-08-24 ENCOUNTER — APPOINTMENT (OUTPATIENT)
Dept: GENERAL RADIOLOGY | Age: 62
DRG: 166 | End: 2022-08-24
Attending: THORACIC SURGERY (CARDIOTHORACIC VASCULAR SURGERY)
Payer: COMMERCIAL

## 2022-08-24 VITALS
OXYGEN SATURATION: 93 % | HEART RATE: 96 BPM | WEIGHT: 161.7 LBS | TEMPERATURE: 98.7 F | DIASTOLIC BLOOD PRESSURE: 55 MMHG | RESPIRATION RATE: 20 BRPM | SYSTOLIC BLOOD PRESSURE: 110 MMHG | BODY MASS INDEX: 30.53 KG/M2 | HEIGHT: 61 IN

## 2022-08-24 LAB
ANION GAP SERPL CALCULATED.3IONS-SCNC: 11 MEQ/L (ref 8–16)
BASOPHILS # BLD: 0.3 %
BASOPHILS ABSOLUTE: 0 THOU/MM3 (ref 0–0.1)
BUN BLDV-MCNC: 33 MG/DL (ref 7–22)
CALCIUM SERPL-MCNC: 8.4 MG/DL (ref 8.5–10.5)
CHLORIDE BLD-SCNC: 103 MEQ/L (ref 98–111)
CO2: 29 MEQ/L (ref 23–33)
CREAT SERPL-MCNC: 1 MG/DL (ref 0.4–1.2)
EOSINOPHIL # BLD: 2 %
EOSINOPHILS ABSOLUTE: 0.3 THOU/MM3 (ref 0–0.4)
ERYTHROCYTE [DISTWIDTH] IN BLOOD BY AUTOMATED COUNT: 13.3 % (ref 11.5–14.5)
ERYTHROCYTE [DISTWIDTH] IN BLOOD BY AUTOMATED COUNT: 45.6 FL (ref 35–45)
GFR SERPL CREATININE-BSD FRML MDRD: 56 ML/MIN/1.73M2
GLUCOSE BLD-MCNC: 147 MG/DL (ref 70–108)
GLUCOSE BLD-MCNC: 73 MG/DL (ref 70–108)
GLUCOSE BLD-MCNC: 75 MG/DL (ref 70–108)
HCT VFR BLD CALC: 28.5 % (ref 37–47)
HEMOGLOBIN: 8.8 GM/DL (ref 12–16)
IMMATURE GRANS (ABS): 0.07 THOU/MM3 (ref 0–0.07)
IMMATURE GRANULOCYTES: 0.4 %
LYMPHOCYTES # BLD: 20.3 %
LYMPHOCYTES ABSOLUTE: 3.2 THOU/MM3 (ref 1–4.8)
MCH RBC QN AUTO: 29.2 PG (ref 26–33)
MCHC RBC AUTO-ENTMCNC: 30.9 GM/DL (ref 32.2–35.5)
MCV RBC AUTO: 94.7 FL (ref 81–99)
MONOCYTES # BLD: 8.7 %
MONOCYTES ABSOLUTE: 1.4 THOU/MM3 (ref 0.4–1.3)
NUCLEATED RED BLOOD CELLS: 0 /100 WBC
PLATELET # BLD: 261 THOU/MM3 (ref 130–400)
PMV BLD AUTO: 10.1 FL (ref 9.4–12.4)
POTASSIUM SERPL-SCNC: 3.5 MEQ/L (ref 3.5–5.2)
RBC # BLD: 3.01 MILL/MM3 (ref 4.2–5.4)
SEG NEUTROPHILS: 68.3 %
SEGMENTED NEUTROPHILS ABSOLUTE COUNT: 10.8 THOU/MM3 (ref 1.8–7.7)
SODIUM BLD-SCNC: 143 MEQ/L (ref 135–145)
WBC # BLD: 15.8 THOU/MM3 (ref 4.8–10.8)

## 2022-08-24 PROCEDURE — 6370000000 HC RX 637 (ALT 250 FOR IP): Performed by: THORACIC SURGERY (CARDIOTHORACIC VASCULAR SURGERY)

## 2022-08-24 PROCEDURE — 2580000003 HC RX 258: Performed by: THORACIC SURGERY (CARDIOTHORACIC VASCULAR SURGERY)

## 2022-08-24 PROCEDURE — 97535 SELF CARE MNGMENT TRAINING: CPT

## 2022-08-24 PROCEDURE — 85025 COMPLETE CBC W/AUTO DIFF WBC: CPT

## 2022-08-24 PROCEDURE — 6360000002 HC RX W HCPCS: Performed by: THORACIC SURGERY (CARDIOTHORACIC VASCULAR SURGERY)

## 2022-08-24 PROCEDURE — 80048 BASIC METABOLIC PNL TOTAL CA: CPT

## 2022-08-24 PROCEDURE — 36415 COLL VENOUS BLD VENIPUNCTURE: CPT

## 2022-08-24 PROCEDURE — APPSS60 APP SPLIT SHARED TIME 46-60 MINUTES: Performed by: PHYSICIAN ASSISTANT

## 2022-08-24 PROCEDURE — 82948 REAGENT STRIP/BLOOD GLUCOSE: CPT

## 2022-08-24 PROCEDURE — 71045 X-RAY EXAM CHEST 1 VIEW: CPT

## 2022-08-24 PROCEDURE — 97530 THERAPEUTIC ACTIVITIES: CPT

## 2022-08-24 RX ORDER — AMIODARONE HYDROCHLORIDE 200 MG/1
200 TABLET ORAL DAILY
Qty: 30 TABLET | Refills: 0 | Status: SHIPPED | OUTPATIENT
Start: 2022-08-25 | End: 2022-09-13

## 2022-08-24 RX ORDER — INSULIN LISPRO 100 [IU]/ML
0-16 INJECTION, SOLUTION INTRAVENOUS; SUBCUTANEOUS
Qty: 5 PEN | Refills: 0 | Status: SHIPPED | OUTPATIENT
Start: 2022-08-24 | End: 2022-09-13

## 2022-08-24 RX ORDER — CLOPIDOGREL BISULFATE 75 MG/1
75 TABLET ORAL DAILY
Qty: 30 TABLET | Refills: 3 | Status: SHIPPED | OUTPATIENT
Start: 2022-08-25

## 2022-08-24 RX ORDER — INSULIN GLARGINE 100 [IU]/ML
45 INJECTION, SOLUTION SUBCUTANEOUS 2 TIMES DAILY
Qty: 5 PEN | Refills: 3 | Status: SHIPPED | OUTPATIENT
Start: 2022-08-24

## 2022-08-24 RX ORDER — INSULIN GLARGINE 100 [IU]/ML
45 INJECTION, SOLUTION SUBCUTANEOUS 2 TIMES DAILY
Qty: 10 ML | Refills: 3 | Status: SHIPPED | OUTPATIENT
Start: 2022-08-24 | End: 2022-08-24 | Stop reason: HOSPADM

## 2022-08-24 RX ORDER — INSULIN LISPRO 100 [IU]/ML
0-16 INJECTION, SOLUTION INTRAVENOUS; SUBCUTANEOUS
Qty: 5 EACH | Refills: 0 | Status: SHIPPED | OUTPATIENT
Start: 2022-08-24 | End: 2022-08-24 | Stop reason: HOSPADM

## 2022-08-24 RX ORDER — OXYCODONE HYDROCHLORIDE 5 MG/1
5 TABLET ORAL EVERY 8 HOURS PRN
Qty: 21 TABLET | Refills: 0 | Status: SHIPPED | OUTPATIENT
Start: 2022-08-24 | End: 2022-08-31 | Stop reason: SDUPTHER

## 2022-08-24 RX ORDER — MULTIVITAMIN WITH IRON
1 TABLET ORAL
Qty: 30 TABLET | Refills: 0 | Status: SHIPPED | OUTPATIENT
Start: 2022-08-25 | End: 2022-09-13

## 2022-08-24 RX ADMIN — SODIUM CHLORIDE, PRESERVATIVE FREE 10 ML: 5 INJECTION INTRAVENOUS at 09:01

## 2022-08-24 RX ADMIN — FUROSEMIDE 20 MG: 20 TABLET ORAL at 09:01

## 2022-08-24 RX ADMIN — ENOXAPARIN SODIUM 40 MG: 100 INJECTION SUBCUTANEOUS at 09:00

## 2022-08-24 RX ADMIN — OXYCODONE 10 MG: 5 TABLET ORAL at 05:54

## 2022-08-24 RX ADMIN — OXYCODONE 10 MG: 5 TABLET ORAL at 01:41

## 2022-08-24 RX ADMIN — FAMOTIDINE 20 MG: 20 TABLET ORAL at 09:01

## 2022-08-24 RX ADMIN — CLOPIDOGREL BISULFATE 75 MG: 75 TABLET ORAL at 09:01

## 2022-08-24 RX ADMIN — AMIODARONE HYDROCHLORIDE 200 MG: 200 TABLET ORAL at 09:01

## 2022-08-24 RX ADMIN — ASPIRIN 81 MG: 81 TABLET, COATED ORAL at 09:01

## 2022-08-24 RX ADMIN — POTASSIUM CHLORIDE 40 MEQ: 1500 TABLET, EXTENDED RELEASE ORAL at 06:17

## 2022-08-24 RX ADMIN — Medication 1 TABLET: at 09:01

## 2022-08-24 RX ADMIN — INSULIN GLARGINE 50 UNITS: 100 INJECTION, SOLUTION SUBCUTANEOUS at 09:03

## 2022-08-24 RX ADMIN — SENNOSIDES AND DOCUSATE SODIUM 1 TABLET: 50; 8.6 TABLET ORAL at 09:01

## 2022-08-24 ASSESSMENT — PAIN DESCRIPTION - FREQUENCY
FREQUENCY: CONTINUOUS
FREQUENCY: CONTINUOUS

## 2022-08-24 ASSESSMENT — PAIN DESCRIPTION - ONSET
ONSET: ON-GOING
ONSET: ON-GOING

## 2022-08-24 ASSESSMENT — PAIN DESCRIPTION - ORIENTATION
ORIENTATION: MID
ORIENTATION: MID

## 2022-08-24 ASSESSMENT — PAIN DESCRIPTION - DESCRIPTORS
DESCRIPTORS: ACHING;DISCOMFORT
DESCRIPTORS: ACHING;DISCOMFORT

## 2022-08-24 ASSESSMENT — PAIN SCALES - GENERAL
PAINLEVEL_OUTOF10: 7
PAINLEVEL_OUTOF10: 5
PAINLEVEL_OUTOF10: 0
PAINLEVEL_OUTOF10: 0
PAINLEVEL_OUTOF10: 7
PAINLEVEL_OUTOF10: 4

## 2022-08-24 ASSESSMENT — PAIN DESCRIPTION - LOCATION
LOCATION: CHEST;INCISION
LOCATION: CHEST;INCISION

## 2022-08-24 ASSESSMENT — PAIN DESCRIPTION - PAIN TYPE
TYPE: SURGICAL PAIN
TYPE: SURGICAL PAIN

## 2022-08-24 ASSESSMENT — PAIN - FUNCTIONAL ASSESSMENT
PAIN_FUNCTIONAL_ASSESSMENT: PREVENTS OR INTERFERES SOME ACTIVE ACTIVITIES AND ADLS
PAIN_FUNCTIONAL_ASSESSMENT: PREVENTS OR INTERFERES SOME ACTIVE ACTIVITIES AND ADLS

## 2022-08-24 NOTE — PROGRESS NOTES
RN went over all discharge instructions with patient and patient's daughter - in -law Shanae You. RN answered all questions with no further questions at this time. Patient discharged with all personal belongings, discharge instructions, and will  prescription medications down at outpatient pharmacy. Patient is aware that the multivitamin is not covered by insurance and prefers to pick this medication up at [facility]. Discharge paperwork is located in yellow folder. RN went over incision care with patient and daughter in law Shanae You. RN answered all questions with no further questions at this time. RN informed patient on making follow-up appointments with providers. Patient acknowledged RN. Patient discharged hoem with Shanae You (daughter - in -law). Patient given extra CHG soap, saline, and gauze for incision care.

## 2022-08-24 NOTE — CARE COORDINATION
Abigail Mueller was evaluated today and a DME order was entered for a wheeled walker because she requires this to successfully complete daily living tasks of ambulating. A wheeled walker is necessary due to the patient's unsteady gait, upper body weakness, and inability to  an ambulation device; and she can ambulate only by pushing a walker instead of a lesser assistive device such as a cane, crutch, or standard walker. The need for this equipment was discussed with the patient and she understands and is in agreement. Abigail Mueller requires a bedside commode due to being confined to one level of the home, and is physically incapable of utilizing regular toilet facilities. Current body weight is Weight: 161 lb 11.2 oz (73.3 kg).

## 2022-08-24 NOTE — PROGRESS NOTES
Cardiac Rehabilitation Consult    Attempted to see patient for cardiac rehab education. Patient currently with other medical staff. Will re attempt at later time or will call patient at home to discuss cardiac rehab.

## 2022-08-24 NOTE — PROGRESS NOTES
[facility] ICU STEPDOWN TELEMETRY 4K  Occupational Therapy  Daily Note  Time:   Time In: 05  Time Out: 4040  Timed Code Treatment Minutes: 24 Minutes  Minutes: 24          Date: 2022  Patient Name: Buffy Nieves,   Gender: female      Room: [facility]011-A  MRN: [id number]  : 1960  (58 y.o.)  Referring Practitioner: Kamini Zapata MD  Diagnosis: CAD, multiple vessel  Additional Pertinent Hx: Per H&P: 58 y.o. diabetic female, no previous history of CAD, originally referred for preoperative clearance for a cervical orthopedic procedure. Abnormal EKG prompted LHC, showing 3v CAD, including severe left main disease, and RCA . Patient reports only chronic progressive fatigue and dyspnea on exertion; denies chest pain, presyncope, or orthopnea. Pt s/p CABG X3 on 22. Restrictions/Precautions:  Restrictions/Precautions: Surgical Protocols, General Precautions, Fall Risk  Position Activity Restriction  Sternal Precautions: No Pushing, No Pulling, 5# Lifting Restrictions  Other position/activity restrictions: Nigerien speaking-needs      SUBJECTIVE: Pt sitting in recliner upon arrival, pt agreeable to OT session, RN gave verbal approval for session, pt's family present during session    PAIN: 410: sternal area    Vitals: Nurse checked vitals prior to session    COGNITION: WFL    ADL:   Upper Extremity Dressing: Minimal Assistance. With threading shirt around back  Lower Extremity Dressing: Minimal Assistance. With donning pants around BLE, with pt then able to pull up . BALANCE:  Standing Balance: Stand By Assistance. With pt using RW for support, and BUE release durign ADL routine with pt pulling pants up    BED MOBILITY:  Not Tested    TRANSFERS:  Sit to Stand:  Contact Guard Assistance. From the edge of chair  Stand to Sit: Contact Guard Assistance.  With vc's to maintain sternal precautiosn    FUNCTIONAL MOBILITY:  Assistive Device: [address] Level:  Contact Guard Assistance. Distance:  HH distances with no SOB and vital signs WNL       ADDITIONAL ACTIVITIES:  Pt completed CABG Step II exercises x10 reps x1 set this date in order to increase strength and improve activity tolerance for ADLs and homemaking tasks. Pt exhibited no fatigue during task, requring no rest breaks and no VCs for technique. ASSESSMENT:     Activity Tolerance:  Patient tolerance of  treatment: good. Discharge Recommendations: 24 hour assistance or supervision and Home with Home Health OT  Equipment Recommendations: Other: will continue to monitor pending progress; may benefit from shower chair/tub transfer bench  Plan: Times per Week: 6x  Current Treatment Recommendations: ROM, Balance training, Functional mobility training, Safety education & training, Patient/Caregiver education & training, Equipment evaluation, education, & procurement, Self-Care / ADL, Endurance training    Patient Education  Patient Education: ADL's and Precautions    Goals  Short Term Goals  Time Frame for Short term goals: by discharge  Short Term Goal 1: Pt will increase activity tolerance for functional mobility household distances with supervision in prep for ADL completion. Short Term Goal 2: Pt will complete BADL tasks with supervision to increase independence with self care tasks. Short Term Goal 3: Pt will tolerate dynamic standing X5 minutes with supervision in prep for sinkside grooming/showering tasks. Short Term Goal 4: Pt will complete functional transfers with supervision in prep for toilet and tub/shower transfers. Short Term Goal 5: Pt will tolerate CABG step II/III exercises X10-15 reps to increase overall strength/endurance needed for ADL completion. Following session, patient left in safe position with all fall risk precautions in place.

## 2022-08-24 NOTE — DISCHARGE SUMMARY
Vitals:  height is 5' 1\" (1.549 m) and weight is 161 lb 11.2 oz (73.3 kg). Her oral temperature is 97.5 °F (36.4 °C). Her blood pressure is 97/54 (abnormal) and her pulse is 100. Her respiration is 20 and oxygen saturation is 90%. DISCHARGE INSTRUCTIONS:  Discharge Medications:         Medication List        START taking these medications      amiodarone 200 MG tablet  Commonly known as: CORDARONE  Take 1 tablet by mouth daily  Start taking on: August 25, 2022     clopidogrel 75 MG tablet  Commonly known as: PLAVIX  Take 1 tablet by mouth daily  Start taking on: August 25, 2022     insulin glargine 100 UNIT/ML injection vial  Commonly known as: LANTUS  Inject 45 Units into the skin 2 times daily If blood sugar consistently less than 80, contact your Primary Care Physician  Replaces: insulin glargine 100 UNIT/ML injection pen     insulin lispro 100 UNIT/ML Soln injection vial  Commonly known as: HUMALOG  Inject 0-16 Units into the skin 3 times daily (with meals)     metoprolol tartrate 25 MG tablet  Commonly known as: LOPRESSOR  Take 1 tablet by mouth 2 times daily     multivitamin Tabs tablet  Take 1 tablet by mouth daily (with breakfast)  Start taking on: August 25, 2022     oxyCODONE 5 MG immediate release tablet  Commonly known as: ROXICODONE  Take 1 tablet by mouth every 8 hours as needed for Pain for up to 7 days. CONTINUE taking these medications      aspirin EC 81 MG EC tablet  Take 1 tablet by mouth in the morning. atorvastatin 40 MG tablet  Commonly known as: LIPITOR  Take 1 tablet by mouth in the morning. blood glucose test strips strip  Commonly known as: FREESTYLE LITE  Apply 1 each topically three times daily     FreeStyle Lancets Misc  Apply 1 each topically 3 times daily.      glucose monitoring kit  Use as directed     Insulin Pen Needle 31G X 8 MM Misc  Commonly known as: B-D ULTRAFINE III SHORT PEN  1 each by Does not apply route daily            STOP taking these medications cyclobenzaprine 10 MG tablet  Commonly known as: FLEXERIL     HYDROcodone-acetaminophen 5-325 MG per tablet  Commonly known as: NORCO     insulin glargine 100 UNIT/ML injection pen  Commonly known as: Lantus SoloStar  Replaced by: insulin glargine 100 UNIT/ML injection vial     losartan 50 MG tablet  Commonly known as: COZAAR     meloxicam 15 MG tablet  Commonly known as: Mobic     metFORMIN 1000 MG tablet  Commonly known as: Glucophage     Trulicity 9.10 CR/2.4WO Sopn  Generic drug: Dulaglutide               Where to Get Your Medications        These medications were sent to [address] [place] [address] , [address] [place] [address] Street  [address], [address] [address]      Phone: [phone]   amiodarone 200 MG tablet  clopidogrel 75 MG tablet  insulin glargine 100 UNIT/ML injection vial  insulin lispro 100 UNIT/ML Soln injection vial  metoprolol tartrate 25 MG tablet  multivitamin Tabs tablet  oxyCODONE 5 MG immediate release tablet       Diet: AHA diet as tolerated    Activity: As instructed on discharge, no lifting more than 10 lbs for one month, no driving while on narcotics. Aerobic activity (walking, climbing stairs, etc.) is encouraged. Wound Care: Clean wounds as instructed on discharge    OFFICE VISIT   ? You should have a follow up appointment in the office in about 1 month after discharge from the hospital.   ?   You may call the office to make an appointment, if you don't have an appointment. ([phone]). ? You will need a chest xray and labs taken around 3-7 days before your follow up appointment. ?   Bring any questions you have so they may be addressed. ? You will need a follow up appointment with you primary care doctor in 7-10 days from discharge, please call and make one if you do not have one.   ?    You will need a follow up appointment with you Cardiologist in 2-3 weeks from discharge, please call and make one if you do not have one.   ? BRING YOUR MEDICATION LIST and medications even over the counter medications to all your follow up apointments. ? Office Location:   [facility], [address], [facility] JOSH VALENCIA II.YASMINE [address]            EMERGENCIES   ? You should either call 911 or go to the Emergency Room to be evaluated if you need seen immediately. ?         Sudden, severe shortness of breath go to the Emergency Room. If you have questions regarding these instructions, please call our office  [phone]. We have an answering service 24 hours a day to get your calls to the ON Call Physician, but we are often in surgery and it takes us awhile to get back to you. Discharge Medications for PCI/MI (performed or attempted): Trop: No results found for: TROPONINT   ASA:                            Yes                      Statin:                          Yes  ACE/ARB:                   No due to hypotension          P2Y12 Inhibitor:          Yes          Beta Blocker:               Yes        Cardiac Rehab:            Yes  Dietary Consult:           Yes           Follow-up:    1   Follow up with Cassius Merritt MD 2-3 weeks with focus on new DM medications and with pt's Cardiologist in 4-5 weeks. 2.  Follow up with Lauren Milian PA-C  in 3-4 weeks, with PA and Lateral CXR, CBC, and BMP. 3. The pt was agreeable to discharge and future plan of care. All questions were thoroughly answered.      Lauren Milian PA-C   Electronincally signed 8/24/2022 at 10:37 AM    CC: Cassius Merritt MD

## 2022-08-24 NOTE — CARE COORDINATION
8/24/22, 9:08 AM EDT    Patient goals/plan/ treatment preferences discussed by  and . Patient goals/plan/ treatment preferences reviewed with patient/ family. Patient/ family verbalize understanding of discharge plan and are in agreement with goal/plan/treatment preferences. Understanding was demonstrated using the teach back method. AVS provided by RN at time of discharge, which includes all necessary medical information pertaining to the patients current course of illness, treatment, post-discharge goals of care, and treatment preferences. Services At/After Discharge: [facility] discharge today, home with family and [facility]. SW notified Nancy Mckeon at [address] of discharge.

## 2022-08-24 NOTE — CARE COORDINATION
08/24/22 8:48 AM    Spoke with Christine and her sister-in-law yesterday; reported Dr. Brianna Dumont in [address] [place], [place] [address]Dr. Lilia in [facility], and Dr. Leslie Alford in [facility] all speak [place]. They states [place] [facility] [place] are too far away and would like to have Dr. Lilia Marie as PCP. CM called Dr. Heidy Quiles office; states he speak minimal [place] and they are not taking new patients. [facility] and asked if any of the residents speak [place]an; she reports Dr. Corbin Cornell speaks [place]. Reported back to Christine and her MICHELLE about the above findings. MICHELLE states she sees Dr. Norma Alfaro NP and is going to call her and see if she can get her into Dr. Surekha White since she goes there. Spoke with Christine and her MICHELLE today; reports she was unable to get her in to Dr. Juan Joel also. They state they would like to have [facility] Corbin Cornell [facility] at discharge. [facility] called [facility] and scheduled new PCP/Hospital follow-up appointment with Dr. Zachary Puckett. Reported to staff that patient only speaks [place]

## 2022-08-24 NOTE — DISCHARGE INSTR - DIET

## 2022-08-24 NOTE — DISCHARGE INSTRUCTIONS
[facility] Inst[facility]  Pt Name: [address] [place] [address] Record Number: [id number]  Today's Date: 8/24/2022    ACTIVITY/EXERCISE   ? It will take 2 to 3 months to feel like you did before surgery in terms of energy and strength. ?    Slowly increase your activity after you go home. Pace yourself, listen to your body. If it hurts, stop. During the first 2 months, no digging, outside bike riding, or using arm movement on  a stationary bike for sternal precautions. ?   The limit on how much you can lift, push or pull is 10 pounds. For the first 4 weeks after surgery, you must not lift anything over 10 pounds. (You cannot lift anything heavier than a gallon of  milk). Beginning the 5th week after surgery, you may lift, push or pull up to 20 pounds. ? Begin your walking program on the first day you are home and record how many times per day and number of minutes on your log. You may climb stairs; there are no limits to stair climbing. ? Continue to do your cough and deep breathing exercises and the incentive spirometer 6 times a day as you did in the hospital.   ?    Do not use arms to get up from a seated position. Instead, rock in your chair to give yourself momentum to sit up.   ?    You may begin light house hold chores, washing a few dishes, dusting, setting the table or folding laundry. ?    You can have sex when it is comfortable for you. The amount of stress on the heart during sex is about the same as climbing 2 flights of stairs. APPETITE   ? Your appetite might be decreased at first.  It should slowly improve. ? Small, frequent meals, as well as cold foods, may help. ?   The dietitian will assist you with a low fat, low cholesterol, low salt diet. ? Consult your open heart binder for diet instructions. TEMPERATURE   ? Take your temperature at the same time each day.   Take your temperature at 8 a.m. (morning) and 8 p.m. (evening). WEIGHT   ? Weigh yourself when you awaken in the morning and record in your daily log. PAIN   ? You may have pain at the incision. Shoulder, neck, back and upper chest discomfort are common. Take your pain medications as ordered. ?   You may use a heating pad on your neck and shoulders if you have soreness. Never place it on your incision. BOWEL HABITS   ? Constipation is common due to Pain medications and inactivity. ?   Try drinking prune juice, eating a well balanced diet including fruits, vegetables and whole grains. ?   If constipation persists, you may use any over-the-counter laxative, suppository or enema. DRIVING AND RIDING IN A CAR INSTRUCTIONS  ? You may ride in a car, NO DRIVING until seen by your surgeon. ? Your surgeon will tell you when you can resume driving at your postoperative office visit, normally 4 weeks after you are discharged, so he can check your sternal incision. ? No DRIVING while on Prescription pain medication! ? You should always wear the seat belt. It is OK to sit in the front passenger seat. If you are in an accident that causes the air bag to go off. ..it is better to have the seat belt on and the air bag to protect yourself. INCISIONS  ? Warning signs of infection: redness, warmth to touch, green colored pus, tender to touch. Notify surgeon's office right away if any warnings occur. ?   Ok to shower daily, no tub baths for the first month following procedure. ?   Be sure to rinse the incision with water and pat dry with clean towels. ?   Wash your hands before beginning to clean your incisions. ?   Wash each of your incisions with Exidine soap and water 2 times a day using a clean wash cloth and towel for each incision each time. Carefully pat incision dry with a towel. ?   Female patients, wear a bra 24 hours/day for 2 weeks. Change your bra daily.   You may place a gauze between the breasts to reduce moisture. ?   Sutures may be removed by any health-care professional after 7 days from JUDY/chest tube removal.    SMOKING   If you smoke, STOP. Smoking will cause early graft closure, other blockages, new heart attacks, and possibly even death. SLEEPING   ? If you have trouble sleeping during the night, take your pain medication at bedtime. SUPPORT STOCKINGS   ? Wear at home for 1 month and when the swelling in your legs go away. Take them off at night when you go to bed. ?   A family member needs to put them on you, it takes more than 10 pounds of pressure to put them on.   ? Hand or machine wash. Line dry. SWELLING OF LEGS   ? It is common to have leg swelling, especially if you have a leg incision. ? It is helpful to keep your legs elevated when you are sitting or lie down and elevate your legs on pillows. AWARENESS OF HEART BEATING   ? You may feel your heart is beating stronger or that your heart is beating in your neck and ears. DON'T WORRY - this is common especially at bedtime. VIDEO   ? This is a video link to watch about discharge. Type into computer and you will be able to watch them. [email or web address]            CALL YOUR SURGEON IF YOU HAVE:   ? Rapid heart rate or fluttering in your chest   ? Fever over 101° F.   ? Weight gain or loss of 3 pounds overnight or 5      pounds in one week   ? Persistent nausea or vomiting        ? ANY NEW STERNAL DRAINAGE   ? Excessive LEG drainage or very red incision   ? Increasing shortness of breath   ? Pain unrelieved by prescribed medication    OFFICE VISITS   ? BRING YOUR MEDICATION LIST and medications even over the counter medications to all your follow up appointments.    ?    You should have a follow up appointment in the office in about 1 month after discharge from the hospital.   Office Location:   [facility] [address] [place] [address] [employer] [facility] JOSH JACKSON OFFBRITTANYGG II.Mal[address]   ? You may call the office to make an appointment, if you don't have an appointment. ([phone]). ? You will need to have a chest xray and labs completed 3-7 days before your follow up appointment. ?    Bring any questions you have so they may be addressed. ? You should have a follow up appointment with your primary care doctor 7-10 days from discharge, please call and make one if you do not have one.   ?   You should have a  follow up appointment with your Cardiologist 2-3 weeks from discharge, please call and make one if you do not have one.   ?   Cardiac Rehab will set up a follow up time for you to begin your rehabilitation program.         EMERGENCIES   ? You should either call 911 or go to the Emergency Room to be evaluated if you need seen immediately. ?  Sudden, severe shortness of breath go to the Emergency Room. If you have questions regarding these instructions, please call our office  [phone]. We have an answering service 24 hours a day to get your calls to the ON Call Physician, but we are often in surgery and it takes us awhile to get back to you.

## 2022-08-30 ENCOUNTER — OFFICE VISIT (OUTPATIENT)
Dept: FAMILY MEDICINE CLINIC | Age: 62
End: 2022-08-30
Payer: COMMERCIAL

## 2022-08-30 ENCOUNTER — TELEPHONE (OUTPATIENT)
Dept: CARDIOTHORACIC SURGERY | Age: 62
End: 2022-08-30

## 2022-08-30 VITALS
BODY MASS INDEX: 30.29 KG/M2 | SYSTOLIC BLOOD PRESSURE: 102 MMHG | HEART RATE: 80 BPM | RESPIRATION RATE: 14 BRPM | TEMPERATURE: 98.7 F | WEIGHT: 160.4 LBS | HEIGHT: 61 IN | DIASTOLIC BLOOD PRESSURE: 56 MMHG | OXYGEN SATURATION: 98 %

## 2022-08-30 DIAGNOSIS — Z79.4 TYPE 2 DIABETES MELLITUS WITH STAGE 3 CHRONIC KIDNEY DISEASE, WITH LONG-TERM CURRENT USE OF INSULIN, UNSPECIFIED WHETHER STAGE 3A OR 3B CKD (HCC): ICD-10-CM

## 2022-08-30 DIAGNOSIS — E11.22 TYPE 2 DIABETES MELLITUS WITH STAGE 3 CHRONIC KIDNEY DISEASE, WITH LONG-TERM CURRENT USE OF INSULIN, UNSPECIFIED WHETHER STAGE 3A OR 3B CKD (HCC): ICD-10-CM

## 2022-08-30 DIAGNOSIS — N18.30 TYPE 2 DIABETES MELLITUS WITH STAGE 3 CHRONIC KIDNEY DISEASE, WITH LONG-TERM CURRENT USE OF INSULIN, UNSPECIFIED WHETHER STAGE 3A OR 3B CKD (HCC): ICD-10-CM

## 2022-08-30 DIAGNOSIS — M48.062 SPINAL STENOSIS, LUMBAR REGION, WITH NEUROGENIC CLAUDICATION: ICD-10-CM

## 2022-08-30 DIAGNOSIS — Z95.1 S/P CABG X 3: ICD-10-CM

## 2022-08-30 DIAGNOSIS — I25.10 CAD IN NATIVE ARTERY: ICD-10-CM

## 2022-08-30 DIAGNOSIS — Z72.0 TOBACCO ABUSE: ICD-10-CM

## 2022-08-30 DIAGNOSIS — I10 PRIMARY HYPERTENSION: Primary | ICD-10-CM

## 2022-08-30 DIAGNOSIS — S31.809A WOUND OF BUTTOCK, UNSPECIFIED LATERALITY, INITIAL ENCOUNTER: ICD-10-CM

## 2022-08-30 PROCEDURE — G8417 CALC BMI ABV UP PARAM F/U: HCPCS | Performed by: STUDENT IN AN ORGANIZED HEALTH CARE EDUCATION/TRAINING PROGRAM

## 2022-08-30 PROCEDURE — 1111F DSCHRG MED/CURRENT MED MERGE: CPT | Performed by: STUDENT IN AN ORGANIZED HEALTH CARE EDUCATION/TRAINING PROGRAM

## 2022-08-30 PROCEDURE — 2022F DILAT RTA XM EVC RTNOPTHY: CPT | Performed by: STUDENT IN AN ORGANIZED HEALTH CARE EDUCATION/TRAINING PROGRAM

## 2022-08-30 PROCEDURE — 3017F COLORECTAL CA SCREEN DOC REV: CPT | Performed by: STUDENT IN AN ORGANIZED HEALTH CARE EDUCATION/TRAINING PROGRAM

## 2022-08-30 PROCEDURE — 99204 OFFICE O/P NEW MOD 45 MIN: CPT | Performed by: STUDENT IN AN ORGANIZED HEALTH CARE EDUCATION/TRAINING PROGRAM

## 2022-08-30 PROCEDURE — G8427 DOCREV CUR MEDS BY ELIG CLIN: HCPCS | Performed by: STUDENT IN AN ORGANIZED HEALTH CARE EDUCATION/TRAINING PROGRAM

## 2022-08-30 PROCEDURE — 3051F HG A1C>EQUAL 7.0%<8.0%: CPT | Performed by: STUDENT IN AN ORGANIZED HEALTH CARE EDUCATION/TRAINING PROGRAM

## 2022-08-30 PROCEDURE — 4004F PT TOBACCO SCREEN RCVD TLK: CPT | Performed by: STUDENT IN AN ORGANIZED HEALTH CARE EDUCATION/TRAINING PROGRAM

## 2022-08-30 RX ORDER — FLASH GLUCOSE SCANNING READER
1 EACH MISCELLANEOUS 4 TIMES DAILY
Qty: 1 EACH | Refills: 1 | Status: SHIPPED | OUTPATIENT
Start: 2022-08-30

## 2022-08-30 RX ORDER — FLASH GLUCOSE SENSOR
1 KIT MISCELLANEOUS 4 TIMES DAILY
Qty: 1 EACH | Refills: 1 | Status: SHIPPED | OUTPATIENT
Start: 2022-08-30

## 2022-08-30 RX ORDER — NYSTATIN 100000 [USP'U]/G
POWDER TOPICAL
Qty: 60 G | Refills: 1 | Status: SHIPPED | OUTPATIENT
Start: 2022-08-30

## 2022-08-30 SDOH — ECONOMIC STABILITY: FOOD INSECURITY: WITHIN THE PAST 12 MONTHS, THE FOOD YOU BOUGHT JUST DIDN'T LAST AND YOU DIDN'T HAVE MONEY TO GET MORE.: NEVER TRUE

## 2022-08-30 SDOH — ECONOMIC STABILITY: FOOD INSECURITY: WITHIN THE PAST 12 MONTHS, YOU WORRIED THAT YOUR FOOD WOULD RUN OUT BEFORE YOU GOT MONEY TO BUY MORE.: NEVER TRUE

## 2022-08-30 ASSESSMENT — PATIENT HEALTH QUESTIONNAIRE - PHQ9
1. LITTLE INTEREST OR PLEASURE IN DOING THINGS: 0
SUM OF ALL RESPONSES TO PHQ QUESTIONS 1-9: 0
SUM OF ALL RESPONSES TO PHQ9 QUESTIONS 1 & 2: 0
SUM OF ALL RESPONSES TO PHQ QUESTIONS 1-9: 0
2. FEELING DOWN, DEPRESSED OR HOPELESS: 0

## 2022-08-30 ASSESSMENT — ENCOUNTER SYMPTOMS
BACK PAIN: 1
COUGH: 0
WHEEZING: 0
CONSTIPATION: 0
SHORTNESS OF BREATH: 0

## 2022-08-30 ASSESSMENT — SOCIAL DETERMINANTS OF HEALTH (SDOH): HOW HARD IS IT FOR YOU TO PAY FOR THE VERY BASICS LIKE FOOD, HOUSING, MEDICAL CARE, AND HEATING?: NOT HARD AT ALL

## 2022-08-30 NOTE — PROGRESS NOTES
[address] [facility] [address] [address]  Dept: [phone]  Loc: [address] Marge Christianson (:  1960) is a 58 y.o. female,New patient, here for evaluation of the following chief complaint(s):  Establish Care (Pt presents to establish care. Pt had a CABG x3 )      ASSESSMENT/PLAN:  1. Primary hypertension   - Continue metoprolol, stop losartan for now and consider restarting when blood pressures increase as currently hypotensive. 2. Tobacco abuse  3. S/P CABG x 3  - Healing well, continue cardiology follow ups, cardiac rehab and dietary visits.  - Continue ASA, metoprolol, plavix. - Stop losartan, can consider resuming when blood pressure increases for renal protection given CKD for renal protection, crn currently normal.  - Follow-up with cardiologist as scheduled 2022 with PA and lateral CXR, CBC and BMP - ordered. - Start taking atorvastatin 40  -     XR CHEST STANDARD (2 VW); Future  -     CBC with Auto Differential; Future  -     Basic Metabolic Panel; Future  4. Spinal stenosis, lumbar region, with neurogenic claudication  -     Babak Navarro MD, Pain Medicine, [facility]  5. Wound of buttock, unspecified laterality, initial encounter  - Small wound in left side of buttock crease, appears to be due to moist environment with mild maceration. Patient does a lot of sitting.  -     nystatin (MYCOSTATIN) 797320 UNIT/GM powder; Apply 3 times daily. , Disp-60 g, R-1, Normal  6. Type 2 diabetes mellitus with stage 3 chronic kidney disease, with long-term current use of insulin, unspecified whether stage 3a or 3b CKD ([facility] [address]  - Restart Trulicity 7.5, patient has pens at home, continue Lantus 44. Stop humalog for now. Start glucose monitor, patient to log results minimum 4 times daily.  -     CBC with Auto Differential; Future  -     Basic Metabolic Panel;  Future  -     Continuous Blood Gluc Sensor (FREESTYLE CARMENCITA 14 DAY SENSOR) MISC; 1 kit by Does not apply route 4 times daily, Disp-1 each, R-1Normal  -     Continuous Blood Gluc  (FREESTYLE CARMENCITA 14 DAY READER) LUCY; 1 kit by Does not apply route 4 times daily, Disp-1 each, R-1Normal  -     Microalbumin / Creatinine Urine Ratio; Future  7. CAD in native artery  - See above for #3  -     XR CHEST STANDARD (2 VW); Future  -     CBC with Auto Differential; Future  -     Basic Metabolic Panel; Future    Return in about 1 month (around 9/30/2022) for Diabetes. SUBJECTIVE/OBJECTIVE:  HPI  Patient presents with sister in law, both Irish speaking. Looking for a family doctor, was previously seeing Dr. Hortensia Renae - 10yrs. Recent CABG, started with irregular EKG, stress, then echo, then cath then CABG. Doing well, still has pain at surgical site requesting more pain medications. No shortness of breath. Is taking ASA, metoprolol, Plavix. Also stated taking losartan but not taking statin. A1c on 8/18/2022 was 7. Previously on Trulicity, 7.5 units, still has trulicity pens. On Lantus 44 units. Stopped humalog due to concerns of low blood sugar. Rash on bottom, left buttock. Pain management for neck, back, shoulders. Has cervical disk degeneration, lower back pain/spinal stenosis and shoulder pain. Review of Systems   Constitutional:  Negative for chills, fatigue and fever. HENT: Negative. Respiratory:  Negative for cough, shortness of breath and wheezing. Cardiovascular:  Positive for chest pain (at surgical site). Negative for palpitations and leg swelling. Gastrointestinal:  Negative for constipation. Genitourinary: Negative. Musculoskeletal:  Positive for arthralgias (neck, lower back and shoulders), back pain and neck pain. Skin:  Positive for wound (left buttock). Neurological: Negative. Psychiatric/Behavioral: Negative. Physical Exam  Constitutional:       Appearance: Normal appearance.    HENT: Head: Normocephalic. Right Ear: External ear normal.      Left Ear: External ear normal.      Mouth/Throat:      Mouth: Mucous membranes are moist.   Eyes:      Pupils: Pupils are equal, round, and reactive to light. Cardiovascular:      Rate and Rhythm: Normal rate and regular rhythm. Pulses: Normal pulses. Heart sounds: Normal heart sounds. Pulmonary:      Effort: Pulmonary effort is normal.      Breath sounds: Normal breath sounds. Abdominal:      General: Bowel sounds are normal.      Palpations: Abdomen is soft. Tenderness: There is no abdominal tenderness. Skin:     General: Skin is warm. Findings: Lesion (small left sided buttock crease lesion) present. Neurological:      General: No focal deficit present. Mental Status: She is alert and oriented to person, place, and time. Psychiatric:         Mood and Affect: Mood normal.         Vitals:    08/30/22 1403   BP: (!) 102/56   Pulse: 80   Resp: 14   Temp: 98.7 °F (37.1 °C)   SpO2: 98%   Weight: 160 lb 6.4 oz (72.8 kg)   Height: 5' 1\" (1.549 m)         An electronic signature was used to authenticate this note.     --Will Harris MD

## 2022-08-30 NOTE — LETTER
[address] [place] [address],[address] [facility] [address]  Phone: [phone]  Fax: [phone]    Shakira Jalloh MD         August 30, 2022     Patient: Fausto Smoker   YOB: 1960   Date of Visit: 8/30/2022       To Whom It May Concern: It is my medical opinion that Fausto Evans requires a disability parking placard for the following reasons:  She cannot walk without assistance from another person or the use of an assistance device (cane, crutch, prosthetic device, wheelchair, etc.). Triple bypass heart surgery. She cannot walk 200 feet without stopping to rest.  Duration of need: permanent    If you have any questions or concerns, please don't hesitate to call.     Sincerely,        Shakira Jalloh MD

## 2022-08-31 DIAGNOSIS — Z95.1 S/P CABG X 3: ICD-10-CM

## 2022-08-31 RX ORDER — OXYCODONE HYDROCHLORIDE 5 MG/1
5 TABLET ORAL EVERY 8 HOURS PRN
Qty: 21 TABLET | Refills: 0 | Status: SHIPPED | OUTPATIENT
Start: 2022-08-31 | End: 2022-09-07

## 2022-08-31 NOTE — TELEPHONE ENCOUNTER
Spoke with [facility], patient's sister in law, informed her of refill, and that any further pain medication will have to be handled by pain management. [facility] states that patient's primary care physician has already sent in a referral to pain management and they are waiting for an appointment to be made.

## 2022-09-01 ENCOUNTER — TELEPHONE (OUTPATIENT)
Dept: FAMILY MEDICINE CLINIC | Age: 62
End: 2022-09-01

## 2022-09-01 DIAGNOSIS — N18.30 TYPE 2 DIABETES MELLITUS WITH STAGE 3 CHRONIC KIDNEY DISEASE, WITH LONG-TERM CURRENT USE OF INSULIN, UNSPECIFIED WHETHER STAGE 3A OR 3B CKD (HCC): Primary | ICD-10-CM

## 2022-09-01 DIAGNOSIS — E11.22 TYPE 2 DIABETES MELLITUS WITH STAGE 3 CHRONIC KIDNEY DISEASE, WITH LONG-TERM CURRENT USE OF INSULIN, UNSPECIFIED WHETHER STAGE 3A OR 3B CKD (HCC): Primary | ICD-10-CM

## 2022-09-01 DIAGNOSIS — Z79.4 TYPE 2 DIABETES MELLITUS WITH STAGE 3 CHRONIC KIDNEY DISEASE, WITH LONG-TERM CURRENT USE OF INSULIN, UNSPECIFIED WHETHER STAGE 3A OR 3B CKD (HCC): Primary | ICD-10-CM

## 2022-09-01 NOTE — TELEPHONE ENCOUNTER
Patient needs script for Libre2 freestyle and monitor sent to pharmacy.  They do not make the one that was previously ordered any longer

## 2022-09-02 RX ORDER — FLASH GLUCOSE SENSOR
1 KIT MISCELLANEOUS 4 TIMES DAILY
Qty: 1 EACH | Refills: 1 | Status: SHIPPED | OUTPATIENT
Start: 2022-09-02

## 2022-09-02 RX ORDER — FLASH GLUCOSE SCANNING READER
1 EACH MISCELLANEOUS 4 TIMES DAILY
Qty: 1 EACH | Refills: 1 | Status: SHIPPED | OUTPATIENT
Start: 2022-09-02

## 2022-09-02 NOTE — TELEPHONE ENCOUNTER
Please let patient know Melony Sancheztle 2 kit and reader ordered.   Thank you,  Electronically signed by Brad Joyce MD on 9/2/2022 at 2:32 PM

## 2022-09-06 ENCOUNTER — TELEPHONE (OUTPATIENT)
Dept: FAMILY MEDICINE CLINIC | Age: 62
End: 2022-09-06

## 2022-09-06 NOTE — TELEPHONE ENCOUNTER
----- Message from [facility] sent at 9/6/2022  1:00 PM EDT -----  Subject: Message to Provider    QUESTIONS  Information for Provider? Pt needs to reschedule Hospital follow up appt. Info is as follows she had open heart surgery angel:// 08/19/22   discharged://08/24/22 at [facility] please cancel appointment for   09/13/22 at1 and call sister in law Nela Stanford at [phone] to reschedule  ---------------------------------------------------------------------------  --------------  [address]  [phone]; Do not leave any message, patient will call back for answer  ---------------------------------------------------------------------------  --------------  SCRIPT ANSWERS  Relationship to Patient? Third Party  Third Party Type? Other  Other Third Party Type? [facility] Nurse   Representative Name?  Bisi Stern

## 2022-09-09 PROBLEM — Z01.818 PREOPERATIVE CLEARANCE: Status: RESOLVED | Noted: 2022-08-10 | Resolved: 2022-09-09

## 2022-09-13 ENCOUNTER — OFFICE VISIT (OUTPATIENT)
Dept: CARDIOLOGY CLINIC | Age: 62
End: 2022-09-13
Payer: COMMERCIAL

## 2022-09-13 VITALS
BODY MASS INDEX: 30.4 KG/M2 | WEIGHT: 161 LBS | DIASTOLIC BLOOD PRESSURE: 62 MMHG | SYSTOLIC BLOOD PRESSURE: 103 MMHG | HEIGHT: 61 IN | HEART RATE: 73 BPM

## 2022-09-13 DIAGNOSIS — R00.2 PALPITATION: Primary | ICD-10-CM

## 2022-09-13 PROCEDURE — G8428 CUR MEDS NOT DOCUMENT: HCPCS | Performed by: INTERNAL MEDICINE

## 2022-09-13 PROCEDURE — 4004F PT TOBACCO SCREEN RCVD TLK: CPT | Performed by: INTERNAL MEDICINE

## 2022-09-13 PROCEDURE — 99214 OFFICE O/P EST MOD 30 MIN: CPT | Performed by: INTERNAL MEDICINE

## 2022-09-13 PROCEDURE — 1111F DSCHRG MED/CURRENT MED MERGE: CPT | Performed by: INTERNAL MEDICINE

## 2022-09-13 PROCEDURE — G8417 CALC BMI ABV UP PARAM F/U: HCPCS | Performed by: INTERNAL MEDICINE

## 2022-09-13 PROCEDURE — 3017F COLORECTAL CA SCREEN DOC REV: CPT | Performed by: INTERNAL MEDICINE

## 2022-09-13 RX ORDER — AMIODARONE HYDROCHLORIDE 100 MG/1
100 TABLET ORAL DAILY
Qty: 30 TABLET | Refills: 2 | Status: SHIPPED | OUTPATIENT
Start: 2022-09-13 | End: 2022-09-20 | Stop reason: ALTCHOICE

## 2022-09-13 NOTE — PROGRESS NOTES
[id number] [facility] [place] [address]  [facility] [address]  Dept: [phone]  Dept Fax: [phone]  Loc: [phone]    Visit Date: 9/13/2022    Ms. Vasquez Harrison is a 58 y.o. female  who presented for:  Chief Complaint   Patient presents with    Follow-Up from Hospital    Coronary Artery Disease   LHC revealed MV CAD (LM diseasE)   S/P CABG  HPI:   RC Vallejo is a pleasant 58year old female patient who  has a past medical history of CAD in native artery, DDD (degenerative disc disease), Frozen shoulder, GERD (gastroesophageal reflux disease), HTN (hypertension), Hyperlipidemia, Lumbar radiculopathy, Obesity, Osteoarthritis, Tobacco abuse, and Type II or unspecified type diabetes mellitus without mention of complication, not stated as uncontrolled. Her father had CAD, CABG. She was previously referred for abnormal EKG, fatigue, risk assessment prior to C-Spine Sx. LHC revealed MV CAD, LM disease. On 8/19/2022, he underwent three vessel CABG (LIMA/LAD, SVG/RI, SVG/PDA). It seems that patient had post operative A Fib, he was discharged home on amiodarone. She is [place] speaking, interpretor service was utilized. She denies chest pain, BUSTAMANTE, palpitations.        Procedures:  8/19/22   1) Coronary artery bypass grafting x 3, with the left internal mammary artery grafted to the left anterior descending artery, a reversed greater saphenous aortocoronary vein graft to the ramus intermedius artery, and a reversed greater saphenous aortocoronary vein graft to the posterior left ventricular artery  2) Endoscopic greater saphenous vein harvest  3) Intraoperative coronary angiography of all distal anastomoses      Current Outpatient Medications:     amiodarone (CORDARONE) 200 MG tablet, Take 1 tablet by mouth daily, Disp: 30 tablet, Rfl: 0    metoprolol tartrate (LOPRESSOR) 25 MG tablet, Take 1 tablet by mouth 2 times daily, Disp: 60 tablet, Rfl: 3    clopidogrel (PLAVIX) 75 MG tablet, Take 1 tablet by mouth daily, Disp: 30 tablet, Rfl: 3    insulin glargine (LANTUS SOLOSTAR) 100 UNIT/ML injection pen, Inject 45 Units into the skin 2 times daily (Patient taking differently: Inject 15 Units into the skin nightly), Disp: 5 pen, Rfl: 3    atorvastatin (LIPITOR) 40 MG tablet, Take 1 tablet by mouth in the morning., Disp: 30 tablet, Rfl: 3    aspirin EC 81 MG EC tablet, Take 1 tablet by mouth in the morning., Disp: 90 tablet, Rfl: 1    Continuous Blood Gluc  (FREESTYLE CARMENCITA 2 READER) LUCY, 1 kit by Does not apply route 4 times daily, Disp: 1 each, Rfl: 1    Continuous Blood Gluc Sensor (FREESTYLE CARMENCITA 2 SENSOR) MISC, 1 kit by Does not apply route 4 times daily, Disp: 1 each, Rfl: 1    nystatin (MYCOSTATIN) 668257 UNIT/GM powder, Apply 3 times daily. , Disp: 60 g, Rfl: 1    Continuous Blood Gluc Sensor (FREESTYLE CARMENCITA 14 DAY SENSOR) MISC, 1 kit by Does not apply route 4 times daily, Disp: 1 each, Rfl: 1    Continuous Blood Gluc  (FREESTYLE CARMENCITA 14 DAY READER) LUCY, 1 kit by Does not apply route 4 times daily, Disp: 1 each, Rfl: 1    Insulin Pen Needle (B-D ULTRAFINE III SHORT PEN) 31G X 8 MM MISC, 1 each by Does not apply route daily, Disp: 30 each, Rfl: 11    glucose blood VI test strips (FREESTYLE LITE) strip, Apply 1 each topically three times daily, Disp: 90 each, Rfl: 11    FREESTYLE LANCETS MISC, Apply 1 each topically 3 times daily. , Disp: 100 each, Rfl: 11    glucose monitoring kit (FREESTYLE) monitoring kit, Use as directed, Disp: 1 kit, Rfl: 11    Past Medical History  Jaylan Dugan  has a past medical history of CAD in native artery, DDD (degenerative disc disease), Frozen shoulder, GERD (gastroesophageal reflux disease), HTN (hypertension), Hyperlipidemia, Lumbar radiculopathy, Obesity, Osteoarthritis, Tobacco abuse, and Type II or unspecified type diabetes mellitus without mention of complication, not stated as uncontrolled. Social History  Christine  reports that she has been smoking cigarettes. She has a 30.00 pack-year smoking history. She has never used smokeless tobacco. She reports that she does not drink alcohol and does not use drugs. Family History  Tejinder Ordonez family history includes Arthritis in her mother; Cancer in her brother; Diabetes in her brother; Diabetes type 2  in her father; Heart Disease in her brother, father, and sister; High Blood Pressure in her sister; Hypertension in her father; Pancreatic Cancer in her brother; Parkinson's Disease in her mother. Past Surgical History   Past Surgical History:   Procedure Laterality Date     SECTION      x3    CORONARY ARTERY BYPASS GRAFT N/A 2022    CABGx3 / JODY performed by Heidi Garay MD at [address]      left and right    TUBAL LIGATION         Review of Systems   Constitutional: Negative for chills and fever  HENT: Negative for congestion, sinus pressure, sneezing and sore throat. Eyes: Negative for pain, discharge, redness and itching. Respiratory: Negative for apnea, cough  Gastrointestinal: Negative for blood in stool, constipation, diarrhea   Endocrine: Negative for cold intolerance, heat intolerance, polydipsia. Genitourinary: Negative for dysuria, enuresis, flank pain and hematuria. Musculoskeletal: Negative for arthralgias, joint swelling and neck pain. Neurological: Negative for numbness and headaches. Psychiatric/Behavioral: Negative for agitation, confusion, decreased concentration and dysphoric mood. Objective:     /62   Pulse 73   Ht 5' 1\" (1.549 m)   Wt 161 lb (73 kg)   BMI 30.42 kg/m²     Wt Readings from Last 3 Encounters:   22 161 lb (73 kg)   22 160 lb 6.4 oz (72.8 kg)   22 161 lb 11.2 oz (73.3 kg)     BP Readings from Last 3 Encounters:   22 103/62   22 (!) 102/56   22 (!) 110/55       Nursing note and vitals reviewed.     Physical Exam Constitutional: Oriented to person, place, and time. Appears well-developed and well-nourished. ENT: Moist mucous membranes. No bleeding. Tongue is midline. Head: Normocephalic and atraumatic. Eyes: EOM are normal. Pupils are equal, round, and reactive to light. Neck: Normal range of motion. Neck supple. No JVD present. Cardiovascular: Normal rate, regular rhythm, no murmur, no rubs, and intact distal pulses. Pulmonary/Chest: Effort normal and breath sounds normal. No respiratory distress. No wheezes. No rales. Abdominal: Soft. Bowel sounds are normal. No distension. There is no tenderness. Musculoskeletal: Normal range of motion. no edema. Neurological: Alert and oriented to person, place, and time. No cranial nerve deficit. Coordination normal.   Skin: Skin is warm and dry. Psychiatric: Normal mood and affect.        No results found for: CKTOTAL, CKMB, CKMBINDEX    Lab Results   Component Value Date/Time    WBC 15.8 08/24/2022 03:46 AM    RBC 3.01 08/24/2022 03:46 AM    HGB 8.8 08/24/2022 03:46 AM    HCT 28.5 08/24/2022 03:46 AM    MCV 94.7 08/24/2022 03:46 AM    MCH 29.2 08/24/2022 03:46 AM    MCHC 30.9 08/24/2022 03:46 AM    RDW 13.0 06/06/2022 12:52 PM     08/24/2022 03:46 AM    MPV 10.1 08/24/2022 03:46 AM       Lab Results   Component Value Date/Time     08/24/2022 03:46 AM    K 3.5 08/24/2022 03:46 AM     08/24/2022 03:46 AM    CO2 29 08/24/2022 03:46 AM    BUN 33 08/24/2022 03:46 AM    LABALBU 4.2 06/06/2022 12:52 PM    CREATININE 1.0 08/24/2022 03:46 AM    CALCIUM 8.4 08/24/2022 03:46 AM    LABGLOM 56 08/24/2022 03:46 AM    GLUCOSE 73 08/24/2022 03:46 AM    GLUCOSE 157 06/06/2022 12:52 PM       Lab Results   Component Value Date/Time    ALKPHOS 92 07/02/2014 11:00 AM    ALT 26 07/02/2014 11:00 AM    AST 23 07/02/2014 11:00 AM    PROT 8.0 07/02/2014 11:00 AM    BILITOT 0.2 07/02/2014 11:00 AM    LABALBU 4.2 06/06/2022 12:52 PM       Lab Results   Component Value Date/Time    MG 1.9 08/20/2022 05:30 AM       Lab Results   Component Value Date    INR 1.16 (H) 08/20/2022    INR 1.35 (H) 08/19/2022    INR 1.13 08/18/2022         Lab Results   Component Value Date/Time    LABA1C 7.0 08/18/2022 08:45 AM       Lab Results   Component Value Date/Time    TRIG 278 08/10/2022 05:47 AM    HDL 28 08/10/2022 05:47 AM    LDLCALC 83 08/10/2022 05:47 AM       No results found for: TSH      Testing Reviewed:      I have individually reviewed the cardiac test below:    ECHO: Results for orders placed during the hospital encounter of 07/29/22    Echo 2D w doppler w color complete    Narrative  Transthoracic Echocardiography Report (TTE)    Demographics    Patient Name  Lyndsay Cash     Gender            Female  [address]    MR #          [id number]         Race              Other    Ethnicity          or     Account #     [de-identified]         Room Number    Accession     [id number]         Date of Study     07/29/2022  Number    Date of Birth 1960        Referring         Navi Kong MD  Physician         Corbin Tovar MD    Age           58 year(s)        Sonographer       MARIA ELENA Lynch,  RDCS, RDMS, RVT    Interpreting      Russell Aguilar MD  Physician    Procedure    Type of Study    TTE procedure:ECHOCARDIOGRAM COMPLETE 2D W DOPPLER W COLOR. Procedure Date  Date: 07/29/2022 Start: 02:16 PM    Study Location: Echo Lab  Technical Quality: Limited visualization due to body habitus. Indications:Abnormal ECG and Hypertension. Additional Medical History:hyperlipidemia, hypertension, gastroesophageal  reflux disease, osteoarthritis, tobacco abuse, family history of coronary  artery disease, diabetes    Patient Status: Routine    Height: 1 inches Weight: 164 pounds BSA: 0.09 m^2 BMI: 480748.35 kg/m^2    BP: 130/78 mmHg    Conclusions    Summary  Left ventricular size and systolic function is normal. Ejection fraction  was estimated at 55-60%.  LV wall thickness is within normal limits and  there are no obvious wall motion abnormalities. The right ventricular size appears normal with normal systolic function  and wall thickness. Signature    ----------------------------------------------------------------  Electronically signed by Selena Mitchell MD (Interpreting  physician) on 07/30/2022 at 11:41 AM  ----------------------------------------------------------------    Findings    Mitral Valve  The mitral valve structure is normal with normal leaflet separation. DOPPLER: The transmitral velocity was within the normal range with no  evidence for mitral stenosis. There was no evidence of mitral  regurgitation. Aortic Valve  The aortic valve appears trileaflet with normal thickness and leaflet  excursion. DOPPLER: Transaortic velocity was within the normal range with  no evidence of aortic stenosis. There was no evidence of aortic  regurgitation. Tricuspid Valve  The tricuspid valve structure is normal with normal leaflet separation. DOPPLER: There is no evidence of tricuspid stenosis. Mild tricuspid regurgitation visualized. Pulmonic Valve  The pulmonic valve leaflets appear normal thickness, and normal cuspal  separation. DOPPLER: The transpulmonic velocity was within the normal  range. No evidence for regurgitation. Left Atrium  Left atrial size is normal.    Left Ventricle  Left ventricular size and systolic function is normal. Ejection fraction  was estimated at 55-60%. LV wall thickness is within normal limits and  there are no obvious wall motion abnormalities. Right Atrium  Right atrial size was normal.    Right Ventricle  The right ventricular size appears normal with normal systolic function  and wall thickness. Pericardial Effusion  The pericardium appears normal with no evidence of a pericardial effusion. Pleural Effusion  No evidence of pleural effusion. Aorta / Great Vessels  -Aortic root dimension within normal limits.   -IVC size is within normal limits with normal respiratory phasic changes. M-Mode/2D Measurements & Calculations    LV Diastolic   LV Systolic Dimension: 3  AV Cusp Separation: 1.3 cmLA  Dimension: 4.5 cm                        Dimension: 3.2 cmAO Root  cm             LV Volume Diastolic: 39.8 Dimension: 2.9 cmLA Area: 17.6  LV FS:33.3 %   ml                        cm^2  LV PW          LV Volume Systolic: 35 ml  Diastolic: 1.3 LV EDV/LV EDV Index: 92.4  cm             ml/1027 m^2LV ESV/LV ESV  Septum         Index: 35 ml/389 m^2      RV Diastolic Dimension: 2.8 cm  Diastolic: 0.9 EF Calculated: 62.1 %  cm                                       LA/Aorta: 1.1  Ascending Aorta: 3.2 cm  LA volume/Index: 54.7 ml /608m^2  LVOT: 1.9 cm    Doppler Measurements & Calculations    MV Peak E-Wave: 79.9 cm/s  AV Peak Velocity:     LVOT Peak Velocity: 78.8  MV Peak A-Wave: 93.5 cm/s  91.2 cm/s             cm/s  MV E/A Ratio: 0.85         AV Peak Gradient:     LVOT Peak Gradient: 2  MV Peak Gradient: 2.55     3.33 mmHg             mmHg  mmHg  TV Peak E-Wave: 61.3 cm/s  MV Deceleration Time: 148                        TV Peak A-Wave: 47.6 cm/s  msec  IVRT: 109 msec        TV Peak Gradient: 1.5  mmHg  MV E' Septal Velocity: 5.8                       TR Velocity:217 cm/s  cm/s                       AV DVI (Vmax):0.86    TR Gradient:18.84 mmHg  MV A' Septal Velocity: 8.2                       PV Peak Velocity: 72.4  cm/s                                             cm/s  MV E' Lateral Velocity:                          PV Peak Gradient: 2.1  7.2 cm/s                                         mmHg  MV A' Lateral Velocity:  11.5 cm/s  E/E' septal: 13.78  E/E' lateral: 11.1  MR Velocity: 311 cm/s    [email or web address] [id number]%[id number]     Cath:8/2022  FINDINGS:  LEFT VENTRICULOGRAM:  No regional wall motion abnormality.   Ejection  fraction 50% to 55%. HEMODYNAMICS:  Left ventricular end-diastolic pressure 9 mmHg. No  significant pressure gradient across the aortic valve upon pullback. CORONARY ANGIOGRAM:  1. RIGHT CORONARY ARTERY:  Dominant vessel. Proximal RCA is patent. Mid RCA has chronic total occlusion. Distal RCA does receive  left-to-right collaterals. 2.  LEFT MAIN CORONARY ARTERY:  Distal left main coronary artery has 50%  to 60% stenosis. Gives rise to ramus intermedius, left circumflex, and  left anterior descending arteries. 3.  RAMUS INTERMEDIUS:  Proximal part of the vessel has 70% to 80%  stenosis. 4.  LEFT CIRCUMFLEX ARTERY:  Mild-to-moderate diffuse disease,  relatively small, nondominant vessel. 5.  LEFT ANTERIOR DESCENDING ARTERY:  Proximal LAD has 10% to 20%  stenosis. Mid LAD has 80% to 90% stenosis. Distal LAD with mild  diffuse disease. MEDICATIONS:  See MAR. COMPLICATIONS:  None. ESTIMATED BLOOD LOSS:  Less than 50 mL. ACCESS:  Right radial artery access. Vasc Band was applied. Hemostasis  was achieved. IMPRESSION:  Multivessel coronary artery disease including severe  stenosis of left main coronary artery, left anterior descending artery  and ramus intermedius artery. Chronic total occlusion of the right  coronary artery. RECOMMENDATIONS:  Bedrest for the next two hours. Access site care. Medical therapy and aggressive risk factor modification. Aspirin 81 mg  p.o. daily. High intensity statin therapy. Avoid nonsteroidal  anti-inflammatory drugs. The patient has prior prohibitive cardiac risk  for planned spinal surgery, will need to hold off for now. Consult  Cardiovascular Surgery, heart team discussion.        AssessmentPlan:   Nehemiah Westbrook is a pleasant 58year old female patient who  has a past medical history of CAD in native artery, DDD (degenerative disc disease), Frozen shoulder, GERD (gastroesophageal reflux disease), HTN (hypertension), Hyperlipidemia,

## 2022-09-13 NOTE — PROGRESS NOTES
Pt here for 1 mo hospital fu     Pt denies chest pain, sob, dizziness,     Pt continues with swelling in right foot

## 2022-09-14 ENCOUNTER — TELEPHONE (OUTPATIENT)
Dept: FAMILY MEDICINE CLINIC | Age: 62
End: 2022-09-14

## 2022-09-14 NOTE — TELEPHONE ENCOUNTER
----- Message from Amy Beckett sent at 9/14/2022 12:54 PM EDT -----  Subject: Appointment Request    Reason for Call: Established Patient Appointment needed: New Patient   Request Appointment    QUESTIONS    Reason for appointment request? Available appointments did not meet   patient need     Additional Information for Provider? Pt would like to see Joana Tabor because   she speaks [place] Pt would like to est. care with her.  Would like a call   back [phone]  ---------------------------------------------------------------------------  --------------  CALL BACK INFO  [phone]; OK to leave message on voicemail  ---------------------------------------------------------------------------  --------------  SCRIPT ANSWERS  USMANID Screen: Jaya Levy

## 2022-09-15 ENCOUNTER — HOSPITAL ENCOUNTER (OUTPATIENT)
Dept: GENERAL RADIOLOGY | Age: 62
Discharge: HOME | End: 2022-09-15
Payer: COMMERCIAL

## 2022-09-15 ENCOUNTER — HOSPITAL ENCOUNTER (OUTPATIENT)
Age: 62
Discharge: HOME | End: 2022-09-15
Payer: COMMERCIAL

## 2022-09-15 DIAGNOSIS — Z79.4 TYPE 2 DIABETES MELLITUS WITH STAGE 3 CHRONIC KIDNEY DISEASE, WITH LONG-TERM CURRENT USE OF INSULIN, UNSPECIFIED WHETHER STAGE 3A OR 3B CKD (HCC): ICD-10-CM

## 2022-09-15 DIAGNOSIS — E11.22 TYPE 2 DIABETES MELLITUS WITH STAGE 3 CHRONIC KIDNEY DISEASE, WITH LONG-TERM CURRENT USE OF INSULIN, UNSPECIFIED WHETHER STAGE 3A OR 3B CKD (HCC): ICD-10-CM

## 2022-09-15 DIAGNOSIS — I25.10 CAD IN NATIVE ARTERY: ICD-10-CM

## 2022-09-15 DIAGNOSIS — Z95.1 S/P CABG X 3: ICD-10-CM

## 2022-09-15 DIAGNOSIS — N18.30 TYPE 2 DIABETES MELLITUS WITH STAGE 3 CHRONIC KIDNEY DISEASE, WITH LONG-TERM CURRENT USE OF INSULIN, UNSPECIFIED WHETHER STAGE 3A OR 3B CKD (HCC): ICD-10-CM

## 2022-09-15 LAB
ANION GAP SERPL CALCULATED.3IONS-SCNC: 11 MEQ/L (ref 8–16)
BASOPHILS # BLD: 0.5 %
BASOPHILS ABSOLUTE: 0 THOU/MM3 (ref 0–0.1)
BUN BLDV-MCNC: 26 MG/DL (ref 7–22)
CALCIUM SERPL-MCNC: 9.3 MG/DL (ref 8.5–10.5)
CHLORIDE BLD-SCNC: 104 MEQ/L (ref 98–111)
CO2: 29 MEQ/L (ref 23–33)
CREAT SERPL-MCNC: 1.6 MG/DL (ref 0.4–1.2)
CREATININE, URINE: 222.1 MG/DL
EOSINOPHIL # BLD: 3.9 %
EOSINOPHILS ABSOLUTE: 0.3 THOU/MM3 (ref 0–0.4)
ERYTHROCYTE [DISTWIDTH] IN BLOOD BY AUTOMATED COUNT: 14 % (ref 11.5–14.5)
ERYTHROCYTE [DISTWIDTH] IN BLOOD BY AUTOMATED COUNT: 47.6 FL (ref 35–45)
GFR SERPL CREATININE-BSD FRML MDRD: 33 ML/MIN/1.73M2
GLUCOSE BLD-MCNC: 165 MG/DL (ref 70–108)
HCT VFR BLD CALC: 35.9 % (ref 37–47)
HEMOGLOBIN: 11.1 GM/DL (ref 12–16)
IMMATURE GRANS (ABS): 0.02 THOU/MM3 (ref 0–0.07)
IMMATURE GRANULOCYTES: 0.2 %
LYMPHOCYTES # BLD: 40 %
LYMPHOCYTES ABSOLUTE: 3.2 THOU/MM3 (ref 1–4.8)
MCH RBC QN AUTO: 28.8 PG (ref 26–33)
MCHC RBC AUTO-ENTMCNC: 30.9 GM/DL (ref 32.2–35.5)
MCV RBC AUTO: 93.2 FL (ref 81–99)
MICROALBUMIN UR-MCNC: 15.61 MG/DL
MICROALBUMIN/CREAT UR-RTO: 70 MG/G (ref 0–30)
MONOCYTES # BLD: 8.4 %
MONOCYTES ABSOLUTE: 0.7 THOU/MM3 (ref 0.4–1.3)
NUCLEATED RED BLOOD CELLS: 0 /100 WBC
PLATELET # BLD: 285 THOU/MM3 (ref 130–400)
PMV BLD AUTO: 9.2 FL (ref 9.4–12.4)
POTASSIUM SERPL-SCNC: 4.6 MEQ/L (ref 3.5–5.2)
RBC # BLD: 3.85 MILL/MM3 (ref 4.2–5.4)
SEG NEUTROPHILS: 47 %
SEGMENTED NEUTROPHILS ABSOLUTE COUNT: 3.8 THOU/MM3 (ref 1.8–7.7)
SODIUM BLD-SCNC: 144 MEQ/L (ref 135–145)
WBC # BLD: 8.1 THOU/MM3 (ref 4.8–10.8)

## 2022-09-15 PROCEDURE — 85025 COMPLETE CBC W/AUTO DIFF WBC: CPT

## 2022-09-15 PROCEDURE — 80048 BASIC METABOLIC PNL TOTAL CA: CPT

## 2022-09-15 PROCEDURE — 36415 COLL VENOUS BLD VENIPUNCTURE: CPT

## 2022-09-15 PROCEDURE — 71046 X-RAY EXAM CHEST 2 VIEWS: CPT

## 2022-09-15 PROCEDURE — 82043 UR ALBUMIN QUANTITATIVE: CPT

## 2022-09-15 NOTE — TELEPHONE ENCOUNTER
Tabitha Aguayo is not accepting new patient at [address] [place] [address]. Patient has been seen at [facility] where Tabitha Aguayo works with [employer]. Patient will need to continue to schedule there during Dr. Mark Call scheduled hours with [employer].

## 2022-09-16 ENCOUNTER — TELEPHONE (OUTPATIENT)
Dept: FAMILY MEDICINE CLINIC | Age: 62
End: 2022-09-16

## 2022-09-16 DIAGNOSIS — N18.32 STAGE 3B CHRONIC KIDNEY DISEASE (HCC): Primary | ICD-10-CM

## 2022-09-20 ENCOUNTER — OFFICE VISIT (OUTPATIENT)
Dept: CARDIOTHORACIC SURGERY | Age: 62
End: 2022-09-20

## 2022-09-20 VITALS
WEIGHT: 162 LBS | SYSTOLIC BLOOD PRESSURE: 90 MMHG | HEIGHT: 61 IN | BODY MASS INDEX: 30.58 KG/M2 | DIASTOLIC BLOOD PRESSURE: 58 MMHG | OXYGEN SATURATION: 97 % | HEART RATE: 69 BPM

## 2022-09-20 DIAGNOSIS — Z95.1 S/P CABG X 3: Primary | ICD-10-CM

## 2022-09-20 PROCEDURE — 99024 POSTOP FOLLOW-UP VISIT: CPT | Performed by: PHYSICIAN ASSISTANT

## 2022-09-20 NOTE — PROGRESS NOTES
CT/CV Surgery Follow Up Office Visit      Patient's Name/Date of Birth: Will Balderas / 1960 (58 y.o.)    CC:   Chief Complaint   Patient presents with    Follow-up     S/p CABG x3 2022 with [facility]     Results     Labs/  chest xray        PCP: Keyana Wolf MD    Date: 2022     HPI:   We had the pleasure of seeing Will Balderas in the office today, as you know this is a very pleasant 58y.o. year old female with a history of CAD. She is S/p CABG X 3 on 22. She presents with niece and interpretation device was used during visit for translation. She is doing well and has no complaints except mild right ankle discomfort. CXR PA & LATERAL: 9/15/22  FINDINGS:    Median sternotomy wires are present. Surgical clips are in the mediastinum. There is cardiomegaly. The pulmonary vasculature is within normal limits. There is no significant pleural effusion or pneumothorax. Visualized portions of the upper abdomen are within normal limits. The osseous structures are intact. No acute fractures or suspicious osseous lesions. Impression   Cardiomegaly with no acute intrathoracic process. **This report has been created using voice recognition software. It may contain minor errors which are inherent in voice recognition technology. **       Final report electronically signed by Dr Antoni Marks on 9/15/2022 11:49 AM       Past Medical History:  Erlinda Bolden  has a past medical history of CAD in native artery, DDD (degenerative disc disease), Frozen shoulder, GERD (gastroesophageal reflux disease), HTN (hypertension), Hyperlipidemia, Lumbar radiculopathy, Obesity, Osteoarthritis, Tobacco abuse, and Type II or unspecified type diabetes mellitus without mention of complication, not stated as uncontrolled. Past Surgical History:  The patient  has a past surgical history that includes shoulder surgery;   section; Tubal ligation; and Coronary artery bypass graft (N/A, 8/19/2022). Allergies: The patient is allergic to tramadol. Medications:  Prior to Admission medications    Medication Sig Start Date End Date Taking? Authorizing Provider   Dulaglutide (TRULICITY SC) Inject into the skin   Yes Historical Provider, MD   amiodarone (PACERONE) 100 MG tablet Take 1 tablet by mouth daily 9/13/22  Yes Luda Patel MD   nystatin (MYCOSTATIN) 578430 UNIT/GM powder Apply 3 times daily. 8/30/22  Yes Jina Molina MD   metoprolol tartrate (LOPRESSOR) 25 MG tablet Take 1 tablet by mouth 2 times daily 8/24/22  Yes Nilay Fernandez PA-C   clopidogrel (PLAVIX) 75 MG tablet Take 1 tablet by mouth daily 8/25/22  Yes Nilay Fernandez PA-C   insulin glargine (LANTUS SOLOSTAR) 100 UNIT/ML injection pen Inject 45 Units into the skin 2 times daily  Patient taking differently: Inject 15 Units into the skin nightly 8/24/22  Yes Nilay Fernandez PA-C   atorvastatin (LIPITOR) 40 MG tablet Take 1 tablet by mouth in the morning. 8/10/22 12/8/22 Yes Luda Patel MD   aspirin EC 81 MG EC tablet Take 1 tablet by mouth in the morning.  8/10/22  Yes Luda Ptael MD   Continuous Blood Gluc  (FREESTYLE CARMENCITA 2 READER) LUCY 1 kit by Does not apply route 4 times daily 9/2/22   Jina Molina MD   Continuous Blood Gluc Sensor (FREESTYLE CARMENCITA 2 SENSOR) MISC 1 kit by Does not apply route 4 times daily 9/2/22   Jina Molina MD   Continuous Blood Gluc Sensor (FREESTYLE CARMENCITA 14 DAY SENSOR) MISC 1 kit by Does not apply route 4 times daily 8/30/22   Jina Molina MD   Continuous Blood Gluc  (FREESTYLE CARMENCITA 14 DAY READER) LUCY 1 kit by Does not apply route 4 times daily 8/30/22   Jina Molina MD   losartan (COZAAR) 50 MG tablet Take 50 mg by mouth daily  8/19/22  Historical Provider, MD   Insulin Pen Needle (B-D ULTRAFINE III SHORT PEN) 31G X 8 MM MISC 1 each by Does not apply route daily 10/17/16   Mark Millskey Respiratory:  Normal respiratory effort. Clear to auscultation, bilaterally without Rales/Wheezes/Rhonch. Cardiovascular:  Regular rate and rhythm with normal S1/S2 without murmurs, rubs or gallops. Abdomen: Soft, non-tender, non-distended with normal bowel sounds. Ext: No clubbing, cyanosis or edema bilaterally. Full range of motion without deformity. Skin: Skin color, texture, turgor normal.  No rashes or lesions. Neurologic:  Neurovascularly intact without any focal sensory/motor deficits. Psychiatric:  Alert and oriented, thought content appropriate, normal insight. Peripheral Pulses: +2 radial palpable, equal bilaterally   Incisions: Clean, dry, and intact. Sternum with no dehiscence. Labs:    CBC:  Lab Results   Component Value Date/Time    WBC 8.1 09/15/2022 10:52 AM    HGB 11.1 09/15/2022 10:52 AM    HCT 35.9 09/15/2022 10:52 AM    MCV 93.2 09/15/2022 10:52 AM     09/15/2022 10:52 AM    INR 1.16 08/20/2022 05:30 AM     BMP:   Lab Results   Component Value Date/Time     09/15/2022 10:52 AM    K 4.6 09/15/2022 10:52 AM     09/15/2022 10:52 AM    CO2 29 09/15/2022 10:52 AM    BUN 26 09/15/2022 10:52 AM    CREATININE 1.6 09/15/2022 10:52 AM    MG 1.9 08/20/2022 05:30 AM       Active Problem List  Patient Active Problem List   Diagnosis    GERD (gastroesophageal reflux disease)    Acquired spondylolisthesis    Spinal stenosis, lumbar region, with neurogenic claudication    Tobacco abuse    Arthralgia of right hand    HTN (hypertension)    Type 1 diabetes mellitus (HCC)    Insomnia    Hyperlipidemia    Onychomycosis of toenail    CAD in native artery    Abnormal stress test    Other fatigue    CAD, multiple vessel    S/P CABG x 3       Assessment:  S/p CABG X 3      Plan 9/20/22:  Continue current medical therapy. D/c'd Amiodarone. Has scheduled Holter Monitor and follow up appointment with Dr. Polly Perez. 2.  CXR and labs reviewed.    3.  Sternal precautions are still in place for 2 full months postop with no heavy lifting, but they are cleared to start driving locally. 4.  Importance of cardiac rehab recommended. 5.  Follow up office visit at [facility]    Thank you for allowing us to be involved in the patient's care.     Electronically by Queen Rowena PA-C  on 9/20/2022 at 11:19 AM

## 2022-09-21 ENCOUNTER — HOSPITAL ENCOUNTER (OUTPATIENT)
Dept: NON INVASIVE DIAGNOSTICS | Age: 62
Discharge: HOME | End: 2022-09-21
Payer: COMMERCIAL

## 2022-09-21 DIAGNOSIS — R00.2 PALPITATION: ICD-10-CM

## 2022-09-21 PROCEDURE — 93226 XTRNL ECG REC<48 HR SCAN A/R: CPT

## 2022-09-21 PROCEDURE — 93225 XTRNL ECG REC<48 HRS REC: CPT

## 2022-09-21 NOTE — PROCEDURES
48 hour Holter Monitor was applied to patient. Patient was instructed to remove monitor on 09/23/2022 at 1011 and return to  of hospital. The serial number on the Holter Monitor is [id number].

## 2022-09-21 NOTE — TELEPHONE ENCOUNTER
Spoke with pt's sister Zoey Srinivasan. Zoey Srinivasan states that she called Dr Andrew Alfaro office on [facility] and they said they are accepting new patients at this time. When informs pt that the message says they are not. Informed the pt to call Dr Andrew Alfaro office. Pt verbalized understanding.

## 2022-09-26 ENCOUNTER — TELEPHONE (OUTPATIENT)
Dept: CARDIOLOGY CLINIC | Age: 62
End: 2022-09-26

## 2022-09-26 LAB
ACQUISITION DURATION: NORMAL S
AVERAGE HEART RATE: 78 BPM
HOOKUP DATE: NORMAL
HOOKUP TIME: NORMAL
MAX HEART RATE TIME/DATE: NORMAL
MAX HEART RATE: 104 BPM
MIN HEART RATE TIME/DATE: NORMAL
MIN HEART RATE: 61 BPM
NUMBER OF QRS COMPLEXES: NORMAL
NUMBER OF SUPRAVENTRICULAR COUPLETS: 0
NUMBER OF SUPRAVENTRICULAR ECTOPICS: 0
NUMBER OF SUPRAVENTRICULAR ISOLATED BEATS: 0
NUMBER OF VENTRICULAR BIGEMINAL CYCLES: 0
NUMBER OF VENTRICULAR COUPLETS: 0
NUMBER OF VENTRICULAR ECTOPICS: 16

## 2022-09-26 NOTE — TELEPHONE ENCOUNTER
----- Message from Alexander Shah MD sent at 9/26/2022  2:29 PM EDT -----  Unremarkable  Only few \"extra beats\" which are not clinically significant       CONCLUSION:   Normal Sinus rhythm   Average Heart Rate 78 Range from 61 bpm to 104 bpm   No long pause or profound bradycardia  Rare Premature ventricular complexes (only 4 detected)         Confirmed by Josh Hobson ([id number]) on 9/26/2022 2:10:57 PM      Specimen Collected: 09/21/22 10:14 EDT

## 2022-11-09 ENCOUNTER — TELEPHONE (OUTPATIENT)
Dept: CARDIOLOGY CLINIC | Age: 62
End: 2022-11-09

## 2022-11-09 NOTE — TELEPHONE ENCOUNTER
Patient had caregiver call to schedule an appointment due to feeling heart palpitations and pain on right side. Advised caregiver patient needs to follow up in ER. Caregiver voiced understanding. Caregiver still requested an appointment. Appointment scheduled for 12-8-22.

## 2022-12-08 ENCOUNTER — OFFICE VISIT (OUTPATIENT)
Dept: CARDIOTHORACIC SURGERY | Age: 62
End: 2022-12-08

## 2022-12-08 ENCOUNTER — OFFICE VISIT (OUTPATIENT)
Dept: CARDIOLOGY CLINIC | Age: 62
End: 2022-12-08
Payer: COMMERCIAL

## 2022-12-08 VITALS
DIASTOLIC BLOOD PRESSURE: 69 MMHG | HEART RATE: 92 BPM | BODY MASS INDEX: 31.23 KG/M2 | HEIGHT: 61 IN | SYSTOLIC BLOOD PRESSURE: 119 MMHG | WEIGHT: 165.4 LBS

## 2022-12-08 DIAGNOSIS — Z95.1 S/P CABG X 3: Primary | ICD-10-CM

## 2022-12-08 DIAGNOSIS — R00.2 PALPITATIONS: Primary | ICD-10-CM

## 2022-12-08 PROCEDURE — 3074F SYST BP LT 130 MM HG: CPT | Performed by: INTERNAL MEDICINE

## 2022-12-08 PROCEDURE — 3017F COLORECTAL CA SCREEN DOC REV: CPT | Performed by: INTERNAL MEDICINE

## 2022-12-08 PROCEDURE — G8427 DOCREV CUR MEDS BY ELIG CLIN: HCPCS | Performed by: INTERNAL MEDICINE

## 2022-12-08 PROCEDURE — 3078F DIAST BP <80 MM HG: CPT | Performed by: INTERNAL MEDICINE

## 2022-12-08 PROCEDURE — G8417 CALC BMI ABV UP PARAM F/U: HCPCS | Performed by: INTERNAL MEDICINE

## 2022-12-08 PROCEDURE — 4004F PT TOBACCO SCREEN RCVD TLK: CPT | Performed by: INTERNAL MEDICINE

## 2022-12-08 PROCEDURE — G8484 FLU IMMUNIZE NO ADMIN: HCPCS | Performed by: INTERNAL MEDICINE

## 2022-12-08 PROCEDURE — 99214 OFFICE O/P EST MOD 30 MIN: CPT | Performed by: INTERNAL MEDICINE

## 2022-12-08 PROCEDURE — 99024 POSTOP FOLLOW-UP VISIT: CPT | Performed by: PHYSICIAN ASSISTANT

## 2022-12-08 NOTE — PROGRESS NOTES
[id number] [facility] [place] [address] [facility] [address]  Dept: [phone]  Dept Fax: [phone]  Loc: [phone]    Visit Date: 12/8/2022    Ms. Presley Frazier is a 58 y.o. female  who presented for:  LHC revealed MV CAD (LM disease)   S/P CABG  HPI:   RC Rodríguez is a pleasant 58year old female patient who  has a past medical history of CAD in native artery, DDD (degenerative disc disease), Frozen shoulder, GERD (gastroesophageal reflux disease), HTN (hypertension), Hyperlipidemia, Lumbar radiculopathy, Obesity, Osteoarthritis, Tobacco abuse, and Type II or unspecified type diabetes mellitus without mention of complication, not stated as uncontrolled. Her father had CAD, CABG. LHC revealed MV CAD, LM disease. On 8/19/2022, he underwent three vessel CABG (LIMA/LAD, SVG/RI, SVG/PDA). It seems that patient had brief post operative A Fib. Holter on 09/2022 was consistent with normal sinus rhythm. Some occasional \"breast pain\". Patient called office on 11/2022 and reported some palpitations. Patient states that the palpitations were transient/intermittent, still with few occasional/mild palpitations, no associated dizziness. She had some mild leg swelling last week, resolved, none at this time. Patient denies chest pain, syncope. Current Outpatient Medications:     Dulaglutide (TRULICITY SC), Inject into the skin, Disp: , Rfl:     Continuous Blood Gluc  (FREESTYLE CARMENCITA 2 READER) LUCY, 1 kit by Does not apply route 4 times daily, Disp: 1 each, Rfl: 1    Continuous Blood Gluc Sensor (FREESTYLE CARMENCITA 2 SENSOR) MISC, 1 kit by Does not apply route 4 times daily, Disp: 1 each, Rfl: 1    nystatin (MYCOSTATIN) 954633 UNIT/GM powder, Apply 3 times daily. , Disp: 60 g, Rfl: 1    Continuous Blood Gluc Sensor (FREESTYLE CARMENCITA 14 DAY SENSOR) MISC, 1 kit by Does not apply route 4 times daily, Disp: 1 each, Rfl: 1    Continuous Blood Gluc  (FREESTYLE CARMENCITA 14 DAY READER) LUCY, 1 kit by Does not apply route 4 times daily, Disp: 1 each, Rfl: 1    metoprolol tartrate (LOPRESSOR) 25 MG tablet, Take 1 tablet by mouth 2 times daily, Disp: 60 tablet, Rfl: 3    clopidogrel (PLAVIX) 75 MG tablet, Take 1 tablet by mouth daily, Disp: 30 tablet, Rfl: 3    insulin glargine (LANTUS SOLOSTAR) 100 UNIT/ML injection pen, Inject 45 Units into the skin 2 times daily (Patient taking differently: Inject 15 Units into the skin nightly), Disp: 5 pen, Rfl: 3    atorvastatin (LIPITOR) 40 MG tablet, Take 1 tablet by mouth in the morning., Disp: 30 tablet, Rfl: 3    aspirin EC 81 MG EC tablet, Take 1 tablet by mouth in the morning., Disp: 90 tablet, Rfl: 1    Insulin Pen Needle (B-D ULTRAFINE III SHORT PEN) 31G X 8 MM MISC, 1 each by Does not apply route daily, Disp: 30 each, Rfl: 11    glucose blood VI test strips (FREESTYLE LITE) strip, Apply 1 each topically three times daily, Disp: 90 each, Rfl: 11    FREESTYLE LANCETS MISC, Apply 1 each topically 3 times daily. , Disp: 100 each, Rfl: 11    glucose monitoring kit (FREESTYLE) monitoring kit, Use as directed, Disp: 1 kit, Rfl: 11    Past Medical History  Goldy Silverio  has a past medical history of CAD in native artery, DDD (degenerative disc disease), Frozen shoulder, GERD (gastroesophageal reflux disease), HTN (hypertension), Hyperlipidemia, Lumbar radiculopathy, Obesity, Osteoarthritis, Tobacco abuse, and Type II or unspecified type diabetes mellitus without mention of complication, not stated as uncontrolled. Social History  Christine  reports that she has been smoking cigarettes. She has a 30.00 pack-year smoking history. She has never used smokeless tobacco. She reports that she does not drink alcohol and does not use drugs.     Family History  Goldy Silverio family history includes Arthritis in her mother; Cancer in her brother; Diabetes in her brother; Diabetes type 2  in her father; Heart Disease in her brother, father, and sister; High Blood Pressure in her sister; Hypertension in her father; Pancreatic Cancer in her brother; Parkinson's Disease in her mother. Past Surgical History   Past Surgical History:   Procedure Laterality Date     SECTION      x3    CORONARY ARTERY BYPASS GRAFT N/A 2022    CABGx3 / JODY performed by Artis Christensen MD at [place] [address].      left and right    TUBAL LIGATION         Review of Systems   Constitutional: Negative for chills and fever  HENT: Negative for congestion, sinus pressure, sneezing and sore throat. Eyes: Negative for pain, discharge, redness and itching. Respiratory: Negative for apnea, cough  Gastrointestinal: Negative for blood in stool, constipation, diarrhea   Endocrine: Negative for cold intolerance, heat intolerance, polydipsia. Genitourinary: Negative for dysuria, enuresis, flank pain and hematuria. Musculoskeletal: Negative for arthralgias, joint swelling and neck pain. Neurological: Negative for numbness and headaches. Psychiatric/Behavioral: Negative for agitation, confusion, decreased concentration and dysphoric mood. Objective: There were no vitals taken for this visit. Wt Readings from Last 3 Encounters:   22 162 lb (73.5 kg)   22 161 lb (73 kg)   22 160 lb 6.4 oz (72.8 kg)     BP Readings from Last 3 Encounters:   22 (!) 90/58   22 103/62   22 (!) 102/56       Nursing note and vitals reviewed. Physical Exam   Constitutional: Oriented to person, place, and time. Appears well-developed and well-nourished. ENT: Moist mucous membranes. No bleeding. Tongue is midline. Head: Normocephalic and atraumatic. Eyes: EOM are normal. Pupils are equal, round, and reactive to light. Neck: Normal range of motion. Neck supple. No JVD present. Cardiovascular: Normal rate, regular rhythm, no murmur, no rubs, and intact distal pulses.     Pulmonary/Chest: Effort normal and breath sounds normal. No respiratory distress. No wheezes. No rales. Abdominal: Soft. Bowel sounds are normal. No distension. There is no tenderness. Musculoskeletal: Normal range of motion. no edema. Neurological: Alert and oriented to person, place, and time. No cranial nerve deficit. Coordination normal.   Skin: Skin is warm and dry. Psychiatric: Normal mood and affect.        No results found for: CKTOTAL, CKMB, CKMBINDEX    Lab Results   Component Value Date/Time    WBC 8.1 09/15/2022 10:52 AM    RBC 3.85 09/15/2022 10:52 AM    HGB 11.1 09/15/2022 10:52 AM    HCT 35.9 09/15/2022 10:52 AM    MCV 93.2 09/15/2022 10:52 AM    MCH 28.8 09/15/2022 10:52 AM    MCHC 30.9 09/15/2022 10:52 AM    RDW 13.0 06/06/2022 12:52 PM     09/15/2022 10:52 AM    MPV 9.2 09/15/2022 10:52 AM       Lab Results   Component Value Date/Time     09/15/2022 10:52 AM    K 4.6 09/15/2022 10:52 AM     09/15/2022 10:52 AM    CO2 29 09/15/2022 10:52 AM    BUN 26 09/15/2022 10:52 AM    LABALBU 4.2 06/06/2022 12:52 PM    CREATININE 1.6 09/15/2022 10:52 AM    CALCIUM 9.3 09/15/2022 10:52 AM    LABGLOM 33 09/15/2022 10:52 AM    GLUCOSE 165 09/15/2022 10:52 AM    GLUCOSE 157 06/06/2022 12:52 PM       Lab Results   Component Value Date/Time    ALKPHOS 92 07/02/2014 11:00 AM    ALT 26 07/02/2014 11:00 AM    AST 23 07/02/2014 11:00 AM    PROT 8.0 07/02/2014 11:00 AM    BILITOT 0.2 07/02/2014 11:00 AM    LABALBU 4.2 06/06/2022 12:52 PM       Lab Results   Component Value Date/Time    MG 1.9 08/20/2022 05:30 AM       Lab Results   Component Value Date    INR 1.16 (H) 08/20/2022    INR 1.35 (H) 08/19/2022    INR 1.13 08/18/2022         Lab Results   Component Value Date/Time    LABA1C 7.0 08/18/2022 08:45 AM       Lab Results   Component Value Date/Time    TRIG 278 08/10/2022 05:47 AM    HDL 28 08/10/2022 05:47 AM    LDLCALC 83 08/10/2022 05:47 AM       No results found for: TSH      Testing Reviewed:      I have individually reviewed the cardiac test below:    ECHO: No results found for this or any previous visit. Holter 9/2022  HOLTER MONITOR: 48 Hours      INDICATION FOR STUDY:  IRREGULAR HEART RATE     CONCLUSION:   Normal Sinus rhythm   Average Heart Rate 78 Range from 61 bpm to 104 bpm   No long pause or profound bradycardia  Rare Premature ventricular complexes (only 4 detected)      Confirmed by BESSY Stevens ([id number]) on 9/26/2022 2:10:57 PM      Cath:8/2022  FINDINGS:  LEFT VENTRICULOGRAM:  No regional wall motion abnormality. Ejection  fraction 50% to 55%. HEMODYNAMICS:  Left ventricular end-diastolic pressure 9 mmHg. No  significant pressure gradient across the aortic valve upon pullback. CORONARY ANGIOGRAM:  1. RIGHT CORONARY ARTERY:  Dominant vessel. Proximal RCA is patent. Mid RCA has chronic total occlusion. Distal RCA does receive  left-to-right collaterals. 2.  LEFT MAIN CORONARY ARTERY:  Distal left main coronary artery has 50%  to 60% stenosis. Gives rise to ramus intermedius, left circumflex, and  left anterior descending arteries. 3.  RAMUS INTERMEDIUS:  Proximal part of the vessel has 70% to 80%  stenosis. 4.  LEFT CIRCUMFLEX ARTERY:  Mild-to-moderate diffuse disease,  relatively small, nondominant vessel. 5.  LEFT ANTERIOR DESCENDING ARTERY:  Proximal LAD has 10% to 20%  stenosis. Mid LAD has 80% to 90% stenosis. Distal LAD with mild  diffuse disease. MEDICATIONS:  See MAR. COMPLICATIONS:  None. ESTIMATED BLOOD LOSS:  Less than 50 mL. ACCESS:  Right radial artery access. Vasc Band was applied. Hemostasis  was achieved. IMPRESSION:  Multivessel coronary artery disease including severe  stenosis of left main coronary artery, left anterior descending artery  and ramus intermedius artery. Chronic total occlusion of the right  coronary artery. RECOMMENDATIONS:  Bedrest for the next two hours. Access site care. Medical therapy and aggressive risk factor modification.   Aspirin 81 mg  p.o. daily. High intensity statin therapy. Avoid nonsteroidal  anti-inflammatory drugs. The patient has prior prohibitive cardiac risk  for planned spinal surgery, will need to hold off for now. Consult  Cardiovascular Surgery, heart team discussion. AssessmentPlan:     Sandy Guthrie is a pleasant 58year old female patient who  has a past medical history of CAD in native artery, DDD (degenerative disc disease), Frozen shoulder, GERD (gastroesophageal reflux disease), HTN (hypertension), Hyperlipidemia, Lumbar radiculopathy, Obesity, Osteoarthritis, Tobacco abuse, and Type II or unspecified type diabetes mellitus without mention of complication, not stated as uncontrolled. Her father had CAD, CABG. LHC revealed MV CAD, LM disease. On 8/19/2022, he underwent three vessel CABG (LIMA/LAD, SVG/RI, SVG/PDA). It seems that patient had brief post operative A Fib. Holter on 09/2022 was consistent with normal sinus rhythm. Some occasional \"breast pain\". Patient called office on 11/2022 and reported some palpitations. Patient states that the palpitations were transient/intermittent, still with few occasional/mild palpitations, no associated dizziness. She had some mild leg swelling last week, resolved, none at this time. Patient denies chest pain, syncope. CAD  S/p 3 vessel CABG 8/2022  HTN  DM  Dyslipidemia   FH of CAD  H/o tobacco abuse, stopped    Preserved EF on Echo 7/2022  On 8/19/2022, he underwent three vessel CABG (LIMA/LAD, SVG/RI, SVG/PDA)  Denies significant chest pain  Continue risk factor modification and medical management  ASA, Plavix  Denies bleeding complications   Lipitor 40 mg po daily   LDL 83 on 8/2022  ChecK Lipid panel, LFT   On Metoprolol   /69  It seems that patient had brief post operative A Fib. Holter on 09/2022 was consistent with normal sinus rhythm. Amiodarone has been stopped. Patient reports mild palpitations.  Counseled about possible AF, possible need for 4 [facility]. She understands, agrees to proceed with event monitor   The patient is advised to continue smoking and begin progressive daily aerobic exercise program   Limit Na intake   Daily weight   The patient was instructed to check the blood pressure at home, and record the readings. Patient will call office with blood pressure readings, will adjust patient's antihypertensive medications as needed accordingly       Above findings and plan of care were discussed with patient in details, patient's questions were answered.      Disposition:  RTC in 12 months            Electronically signed by Cornelia Saavedra MD, [facility]    12/8/2022 at 11:31 AM EST

## 2022-12-08 NOTE — PROGRESS NOTES
CT/CV Surgery Follow Up Office Visit      Patient's Name/Date of Birth: Guido Felix / 1960 (58 y.o.)    CC:   No chief complaint on file. PCP: Layla Lacy MD    Date: 2022     HPI:   Ms. Guido Felix had a scheduled office appointment with Dr. Wilma Paniagua today. She is S/P CABG X 3 on 22. She presents with an interpretation device which was used during the visit for translation. While the patient was in the office for her appointment with Dr. Wilma Paniagua, she asked if CT Surgery could look at a small \"bump\" under sternal skin incision. I felt the area she was concerned about. I explained to her that what she was feeling was probably the surgical knot and the sternal skin incision is well healed. She states that it is not painful and not getting larger. She denies fever, erythema of incision. Otherwise she is doing well. Past Medical History:  Mariajose Whittaker  has a past medical history of CAD in native artery, DDD (degenerative disc disease), Frozen shoulder, GERD (gastroesophageal reflux disease), HTN (hypertension), Hyperlipidemia, Lumbar radiculopathy, Obesity, Osteoarthritis, Tobacco abuse, and Type II or unspecified type diabetes mellitus without mention of complication, not stated as uncontrolled. Past Surgical History:  The patient  has a past surgical history that includes shoulder surgery;  section; Tubal ligation; and Coronary artery bypass graft (N/A, 2022). Allergies: The patient is allergic to tramadol. Medications:  Prior to Admission medications    Medication Sig Start Date End Date Taking?  Authorizing Provider   Dulaglutide (TRULICITY SC) Inject into the skin    Historical Provider, MD   Continuous Blood Gluc  (FREESTYLE CARMENCITA 2 READER) LUCY 1 kit by Does not apply route 4 times daily 22   Suzanne Nash MD   Continuous Blood Gluc Sensor (FREESTYLE CARMENCITA 2 SENSOR) MISC 1 kit by Does not apply route 4 times daily 9/2/22   Sarahy Resendiz MD   Continuous Blood Gluc Sensor (FREESTYLE CARMENCITA 14 DAY SENSOR) MISC 1 kit by Does not apply route 4 times daily 8/30/22   Sarahy Resendiz MD   Continuous Blood Gluc  (FREESTYLE CARMENCITA 14 DAY READER) LUCY 1 kit by Does not apply route 4 times daily 8/30/22   Sarahy Resendiz MD   metoprolol tartrate (LOPRESSOR) 25 MG tablet Take 1 tablet by mouth 2 times daily 8/24/22   JANY Be-C   clopidogrel (PLAVIX) 75 MG tablet Take 1 tablet by mouth daily 8/25/22   Dafne Fulton PA-C   insulin glargine (LANTUS SOLOSTAR) 100 UNIT/ML injection pen Inject 45 Units into the skin 2 times daily  Patient taking differently: Inject 15 Units into the skin nightly 8/24/22   JANY Be-C   atorvastatin (LIPITOR) 40 MG tablet Take 1 tablet by mouth in the morning. 8/10/22 12/8/22  Kayla Floyd MD   aspirin EC 81 MG EC tablet Take 1 tablet by mouth in the morning. 8/10/22   Kayla Floyd MD   losartan (COZAAR) 50 MG tablet Take 50 mg by mouth daily  8/19/22  Historical Provider, MD   Insulin Pen Needle (B-D ULTRAFINE III SHORT PEN) 31G X 8 MM MISC 1 each by Does not apply route daily 10/17/16   Gavino Poole MD   meloxicam [facility]) 15 MG tablet Take 1 tablet by mouth daily 10/17/16 8/10/22  Gavino Poole MD   metFORMIN (GLUCOPHAGE) 1000 MG tablet Take 1 tablet by mouth 2 times daily (with meals) 7/19/16 8/24/22  Gavino Poole MD   glucose blood VI test strips (FREESTYLE LITE) strip Apply 1 each topically three times daily 4/20/16   Gavino Poole MD   FREESTYLE LANCETS MISC Apply 1 each topically 3 times daily. 2/3/14   Gavino Poole MD   glucose monitoring kit (FREESTYLE) monitoring kit Use as directed 2/3/14   Gavino Poole MD       Family History:   This patient's family history includes Arthritis in her mother; Cancer in her brother; Diabetes in her brother; Diabetes type 2  in her father; Heart Disease in her brother, father, and sister; High Blood Pressure in her sister; Hypertension in her father; Pancreatic Cancer in her brother; Parkinson's Disease in her mother. Social History:  Su Case  reports that she has been smoking cigarettes. She has a 30.00 pack-year smoking history. She has never used smokeless tobacco. She reports that she does not drink alcohol and does not use drugs. Vital Signs: There were no vitals taken for this visit. ROS:   Constitutional: Negative for activity change, chills, fatigue, fever and unexpected weight change. Respiratory: Negative for apnea, shortness of breath, wheezing and stridor. Cardiovascular: Negative for chest pain, palpitations and leg swelling. Gastrointestinal: Negative for hematochezia, melana, constipation, and N/V/D. Musculoskeletal: Negative for myalgias  Skin: As above. Negative for color change, rash and wound. Neurological: Negative for dizziness or syncope. Physical Exam:  General appearance:  No acute distress, appears stated age and cooperative. Neck: No jugular venous distention. Trachea midline. Respiratory:  Normal respiratory effort. Clear to auscultation, bilaterally without Rales/Wheezes/Rhonch. Cardiovascular:  Regular rate and rhythm with normal S1/S2 without murmurs, rubs or gallops. Abdomen: Soft, non-tender, non-distended with normal bowel sounds. Ext: No clubbing, cyanosis or edema bilaterally. Full range of motion without deformity. Skin: Skin color, texture, turgor normal.  No rashes or lesions. Neurologic:  Neurovascularly intact without any focal sensory/motor deficits. Psychiatric:  Alert and oriented, thought content appropriate, normal insight. Peripheral Pulses: Sternum+2 radial palpable, equal bilaterally   Sternum: Incision clean, dry, and intact, healed. Inferior portion of sternal skin incision - surgical knot palpable under well healed incision.  No erythema of skin incision. Labs:    CBC:  Lab Results   Component Value Date/Time    WBC 8.1 09/15/2022 10:52 AM    HGB 11.1 09/15/2022 10:52 AM    HCT 35.9 09/15/2022 10:52 AM    MCV 93.2 09/15/2022 10:52 AM     09/15/2022 10:52 AM    INR 1.16 08/20/2022 05:30 AM     BMP:   Lab Results   Component Value Date/Time     09/15/2022 10:52 AM    K 4.6 09/15/2022 10:52 AM     09/15/2022 10:52 AM    CO2 29 09/15/2022 10:52 AM    BUN 26 09/15/2022 10:52 AM    CREATININE 1.6 09/15/2022 10:52 AM    MG 1.9 08/20/2022 05:30 AM       Active Problem List  Patient Active Problem List   Diagnosis    GERD (gastroesophageal reflux disease)    Acquired spondylolisthesis    Spinal stenosis, lumbar region, with neurogenic claudication    Tobacco abuse    Arthralgia of right hand    HTN (hypertension)    Type 1 diabetes mellitus (HCC)    Insomnia    Hyperlipidemia    Onychomycosis of toenail    CAD in native artery    Abnormal stress test    Other fatigue    CAD, multiple vessel    S/P CABG x 3       Assessment/Plan 12/8/22:  S/p CABG X 3  Call office to make appointment with CT Surgery if area of surgical knot becomes enlarged, painful or infected. Follow up office visit at [facility]       Thank you for allowing us to be involved in the patient's care.     Electronically by JANY Carreno  on 12/8/2022 at 12:41 PM

## 2022-12-12 ENCOUNTER — HOSPITAL ENCOUNTER (OUTPATIENT)
Age: 62
Discharge: HOME | End: 2022-12-12
Payer: COMMERCIAL

## 2022-12-12 DIAGNOSIS — R00.2 PALPITATIONS: ICD-10-CM

## 2022-12-12 LAB
ALBUMIN SERPL-MCNC: 4.1 G/DL (ref 3.5–5.1)
ALP BLD-CCNC: 77 U/L (ref 38–126)
ALT SERPL-CCNC: 18 U/L (ref 11–66)
AST SERPL-CCNC: 18 U/L (ref 5–40)
BILIRUB SERPL-MCNC: < 0.2 MG/DL (ref 0.3–1.2)
BILIRUBIN DIRECT: < 0.2 MG/DL (ref 0–0.3)
CHOLESTEROL, TOTAL: 160 MG/DL (ref 100–199)
HDLC SERPL-MCNC: 38 MG/DL
LDL CHOLESTEROL CALCULATED: 67 MG/DL
TOTAL PROTEIN: 7.6 G/DL (ref 6.1–8)
TRIGL SERPL-MCNC: 273 MG/DL (ref 0–199)

## 2022-12-12 PROCEDURE — 80061 LIPID PANEL: CPT

## 2022-12-12 PROCEDURE — 80076 HEPATIC FUNCTION PANEL: CPT

## 2022-12-12 PROCEDURE — 36415 COLL VENOUS BLD VENIPUNCTURE: CPT

## 2022-12-16 ENCOUNTER — HOSPITAL ENCOUNTER (OUTPATIENT)
Dept: NON INVASIVE DIAGNOSTICS | Age: 62
Discharge: HOME | End: 2022-12-16
Payer: COMMERCIAL

## 2022-12-16 DIAGNOSIS — R00.2 PALPITATIONS: ICD-10-CM

## 2022-12-16 PROCEDURE — 93270 REMOTE 30 DAY ECG REV/REPORT: CPT

## 2022-12-16 NOTE — PROCEDURES
The skin was prepped and a 30 day cardiac event monitor was applied. The patient was instructed on the documentation of symptoms and the purpose of the monitor as well as the things to avoid while wearing the monitor. The patient was instructed to remove and return the monitor on 01/15/2023.   The serial number of the monitor that was applied is [id number]

## 2023-01-13 ENCOUNTER — HOSPITAL ENCOUNTER (OUTPATIENT)
Age: 63
Setting detail: SPECIMEN
Discharge: HOME | End: 2023-01-13

## 2023-01-15 LAB
CULTURE: ABNORMAL
CULTURE: ABNORMAL
SPECIMEN DESCRIPTION: ABNORMAL

## 2023-02-16 ENCOUNTER — TELEPHONE (OUTPATIENT)
Dept: CARDIOLOGY CLINIC | Age: 63
End: 2023-02-16

## 2023-02-16 ENCOUNTER — TELEPHONE (OUTPATIENT)
Dept: PHYSICAL MEDICINE AND REHAB | Age: 63
End: 2023-02-16

## 2023-02-16 ENCOUNTER — OFFICE VISIT (OUTPATIENT)
Dept: PHYSICAL MEDICINE AND REHAB | Age: 63
End: 2023-02-16
Payer: COMMERCIAL

## 2023-02-16 VITALS
SYSTOLIC BLOOD PRESSURE: 128 MMHG | HEIGHT: 61 IN | WEIGHT: 165 LBS | BODY MASS INDEX: 31.15 KG/M2 | DIASTOLIC BLOOD PRESSURE: 72 MMHG

## 2023-02-16 DIAGNOSIS — M47.814 THORACIC SPONDYLOSIS: ICD-10-CM

## 2023-02-16 DIAGNOSIS — M47.816 LUMBAR SPONDYLOSIS: Primary | ICD-10-CM

## 2023-02-16 DIAGNOSIS — M54.12 CERVICAL RADICULITIS: ICD-10-CM

## 2023-02-16 DIAGNOSIS — G89.4 CHRONIC PAIN SYNDROME: ICD-10-CM

## 2023-02-16 DIAGNOSIS — M47.816 LUMBAR FACET ARTHROPATHY: ICD-10-CM

## 2023-02-16 DIAGNOSIS — M54.2 NECK PAIN: ICD-10-CM

## 2023-02-16 PROCEDURE — 99244 OFF/OP CNSLTJ NEW/EST MOD 40: CPT | Performed by: PAIN MEDICINE

## 2023-02-16 PROCEDURE — 3074F SYST BP LT 130 MM HG: CPT | Performed by: PAIN MEDICINE

## 2023-02-16 PROCEDURE — G8417 CALC BMI ABV UP PARAM F/U: HCPCS | Performed by: PAIN MEDICINE

## 2023-02-16 PROCEDURE — G8484 FLU IMMUNIZE NO ADMIN: HCPCS | Performed by: PAIN MEDICINE

## 2023-02-16 PROCEDURE — G8427 DOCREV CUR MEDS BY ELIG CLIN: HCPCS | Performed by: PAIN MEDICINE

## 2023-02-16 PROCEDURE — 3078F DIAST BP <80 MM HG: CPT | Performed by: PAIN MEDICINE

## 2023-02-16 RX ORDER — HYDROCODONE BITARTRATE AND ACETAMINOPHEN 5; 325 MG/1; MG/1
1 TABLET ORAL EVERY 6 HOURS PRN
Qty: 60 TABLET | Refills: 0 | Status: SHIPPED | OUTPATIENT
Start: 2023-02-18 | End: 2023-03-05

## 2023-02-16 ASSESSMENT — ENCOUNTER SYMPTOMS
EYE PAIN: 0
SINUS PRESSURE: 0
SORE THROAT: 0
CONSTIPATION: 0
DIARRHEA: 0
VOMITING: 0
SHORTNESS OF BREATH: 0
BACK PAIN: 1
ABDOMINAL PAIN: 0
PHOTOPHOBIA: 0
COUGH: 0
WHEEZING: 0
RHINORRHEA: 0
COLOR CHANGE: 0
NAUSEA: 0
CHEST TIGHTNESS: 0

## 2023-02-16 NOTE — LETTER
[address] [address] [facility] [address] [place] [address]  Phone: [phone]  Fax: [phone]    Brett Castrejon MD    February 16, 2023     Keenan Matamoros MD  [place] [id number]    Patient: Arminda Vallejo   MR Number: [id number]   YOB: 1960   Date of Visit: 2/16/2023       Dear Cdoi Bass: Thank you for referring Arminda Vallejo to me for evaluation/treatment. Below are the relevant portions of my assessment and plan of care. Assessment:     1. Lumbar spondylosis    2. Lumbar facet arthropathy    3. Thoracic spondylosis    4. Neck pain    5. Cervical radiculitis    6. Chronic pain syndrome        Plan:      · Patient read and signed orientation and opioid agreement. · OARRS reviewed. Current MED: 20  · Patient was not offered naloxone for home. · Discussed long term side effects of medications, tolerance, dependency and addiction. · UDS preformed today. · Patient told can not receive any pain medications from any other source. · No evidence of abuse, diversion or aberrant behavior. · Prescription Needs: Will prescribe patient a short length of pain medications in good jessica until UDS come back   Medications and/or procedures to improve function and quality of life- patient understanding with this and that may not be pain free   Discussed possible weaning of medication dosing dependent on treatment/procedure results.  Discussed with patient about safe storage of medications at home   Testing or Radiology Reviewed:  Reviewed CT Lumbar Spine with patient.  Labs:    Procedures: Bilateral L-facet MBB @ L4-5 and L5-S1    Discussed with patient about risks with procedure including infection, reaction to medication, increased pain, or bleeding.  Medications:  LAST DOSE OF NORCO WAS IN THE MORNING TODAY.    If patient is on blood thinners will need approval to hold: yes     ASA, Plavix   Does patient have implanted device Stimulator, AICD or Pacemaker etc? No          Meds. Prescribed:   Orders Placed This Encounter   Medications    HYDROcodone-acetaminophen (NORCO) 5-325 MG per tablet     Sig: Take 1 tablet by mouth every 6 hours as needed for Pain for up to 15 days. Take lowest dose possible to manage pain Max Daily Amount: 4 tablets     Dispense:  60 tablet     Refill:  0     Reduce doses taken as pain becomes manageable         Return Bilateral L-facet MBB @ L4-5 and L5-S1. If you have questions, please do not hesitate to call me. I look forward to following Christine along with you.     Sincerely,      Gabe Dominguez MD

## 2023-02-16 NOTE — TELEPHONE ENCOUNTER
Pre op Risk Assessment    Procedure Lumbar Facet MBB  Physician [facility]  Date of surgery/procedure TBD    Last OV 12-8-22  Last Stress 7-19-22  Last Echo 7-29-22  Last Cath 8-10-22  Last Stent CABGx3 on 8-19-22  Is patient on blood thinners Plavix & ASA  Hold Meds/how many days 5-7 days prior

## 2023-02-16 NOTE — PROGRESS NOTES
[address] [facility] [address]  Dept: [phone]  Dept Fax: [phone]: [phone]    Visit Date: 2/16/2023    Arminda Vallejo is a 61 y.o. female who is referred for pain management evaluation and treatment per Dr. Nel Gama. CAGE and CAGE-AID Questions   1. In the last three months, have you felt you should cut down or stop drinking or using drugs? Yes []        No [x]     2. In the last three months, has anyone annoyed you or gotten on your nerves by telling you to cut down or stop drinking or using drugs? Yes []        No [x]     3. In the last three months, have you felt guilty or bad about how much you drink or use drugs? Yes []        No [x]     4. In the last three months, have you been waking up wanting to have an alcoholic drink or use drugs? Yes []        No [x]        Opioid Risk Tool:  Clinician Form       1. Family History of Substance Abuse: Female Male    Alcohol   []1   []3    Illegal drugs   []2   []3    Prescription drugs     []4   []4   2. Personal History of Substance Abuse:          Alcohol   []3   []3    Illegal drugs   []4   []4    Prescription drugs     []5   []5   3. Age (ming box if between 12 and 39):     []1   []1   4. History of Preadolescent Sexual Abuse:     []3   []0   5. Psychological Disease:      Attention deficit disorder, obsessive-compulsive disorder, bipolar, schizophrenia   []2   []2      Depression     []1   []1    Scoring Totals 0 0     Total Score  Low Risk  Moderate Risk  High Risk   Risk Category   0 - 3   4 - 7   8 or Above      Patient states symptoms interfere with:  A.  General Activity:  yes  B. Mood: yes    C. Walking Ability:   yes  D. Normal Work (Includes both work outside the home and housework):   no   E.  Relations with Other People:  {no   F.  Sleep:   yes   G.  Enjoyment of Life:  yes      HPI: ChiefComplaint: Lumbar back pain and cervical pain        Consult preformed in Angolan. HPI    Patient is a  62 y/o  lady with complaints of cervical and lumbar area. States cervical spine pain started around 2 years ago. States pain had jose david worse secondary taking care of her mother with Parkinson. Patient did see Dr Maddie Skinner for evaluation of her cervical area and recommend surgery. The surgery was cancelled secondary was found CAD. Patient required cardiac surgery August/2022 at [facility]. Patient states still healed from this surgery. Patient denies any falls or previous cervical surgeries. Pain is worse with house activity, getting up and down, turning neck, push and pulling, States has numbness BUE and sometimes weakness. PT: Yes,  any benefit? No, how many weeks? 6, last date done: last year  Any prior spine or ortho surgeon consult and with whom Yes      Patient states has at least 7 years of lower back pain. Patient denies any falls or prior lumbar surgeries.  has referred several years ago to Dr Dulce Blanco, had injection no benefit. Patient does not recall type of injections.  has been on Norco for many years per PCP, Dr Soo Velasco. States pain is worse with bending, standing,walking and house work. States pain is across lower back. Denies any bladder or bowel dysfunction. Denies radicular symptoms. States lower back pain worse now. Pain scale 9/10 at worse and 7/10 at best.    Radiology:      CT Lumbar Scan 1/12/22       EXAMINATION: CT LUMBAR SPINE - CT Spine Lumbar W/O Contrast Injection          TECHNIQUE:     Helically acquired images were obtained of the lumbar spine. 2D reformats     were reviewed. A radiation dose optimization technique was used for this     scan. IV Contrast dosage and agent: None. COMPARISON: None.     ____________________________________________          FINDINGS:          VERTEBRAE: No fracture or traumatic subluxation.  No discrete lytic or     blastic abnormality observed. Normal alignment. DISCS and SPINAL CANAL: T10-11 and T11-T12 significant irregular endplate     sclerosis and anterior osteophyte formation. At T11-T12 there is     posterior irregular osteophyte formation that appears to result in     moderate central canal stenosis. Mild degenerative disc and endplate changes lumbar spine with anterior     osteophyte formation. At L5-S1 there is a posterior osteophyte formation     and endplate sclerosis extending into the left neural foramina appears to     result in at least moderate left neural foraminal narrowing. There is at     least mild right neural foraminal narrowing. Significant facet     arthropathy L4-5 and L5-S1 levels with irregular subchondral sclerosis and     vacuum disc phenomenon. VISUALIZED ABDOMEN: Visualized abdominal aorta is not dilated but shows     moderate atherosclerosis. No appreciable luminal narrowing. There is no     retroperitoneal adenopathy. IMPRESSION:          Multilevel degenerative disc disease and facet arthropathy with     significant, right greater than left, bilateral neural foraminal narrowing     at L5-S1. Distal thoracic spine degenerative disc and endplate changes with moderate     central canal stenosis at T11-T12 from posterior osteophyte. Prior Injections: The patient is allergic to tramadol. Subjective:      Review of Systems   Constitutional:  Positive for activity change. Negative for appetite change, chills, diaphoresis, fatigue, fever and unexpected weight change. HENT:  Negative for congestion, ear pain, hearing loss, mouth sores, nosebleeds, rhinorrhea, sinus pressure and sore throat. Eyes:  Negative for photophobia, pain and visual disturbance. Respiratory:  Negative for cough, chest tightness, shortness of breath and wheezing. Smoked until day before CABG.    Cardiovascular:  Negative for chest pain and palpitations. CAD S/P CABG 2002. Gastrointestinal:  Negative for abdominal pain, constipation, diarrhea, nausea and vomiting. Endocrine: Negative for cold intolerance, heat intolerance, polydipsia, polyphagia and polyuria. NIDDM   Genitourinary:  Positive for frequency. Negative for decreased urine volume, difficulty urinating and hematuria. Musculoskeletal:  Positive for back pain, gait problem and neck pain. Negative for arthralgias, joint swelling, myalgias and neck stiffness. Skin:  Negative for color change and rash. Allergic/Immunologic: Negative for food allergies and immunocompromised state. Neurological:  Positive for numbness. Negative for dizziness, tremors, seizures, syncope, facial asymmetry, speech difficulty, weakness, light-headedness and headaches. Hematological:  Does not bruise/bleed easily. Psychiatric/Behavioral:  Positive for sleep disturbance. Negative for agitation, behavioral problems, confusion, decreased concentration, dysphoric mood, hallucinations, self-injury and suicidal ideas. The patient is not nervous/anxious and is not hyperactive. Objective:     Vitals:    02/16/23 1434   BP: 128/72   Site: Right Upper Arm   Position: Sitting   Cuff Size: Medium Adult   Weight: 165 lb (74.8 kg)   Height: 5' 0.98\" (1.549 m)       Physical Exam  Vitals and nursing note reviewed. Constitutional:       General: She is not in acute distress. Appearance: She is well-developed. She is not diaphoretic. HENT:      Head: Normocephalic and atraumatic. Right Ear: External ear normal.      Left Ear: External ear normal.      Nose: Nose normal.      Mouth/Throat:      Pharynx: No oropharyngeal exudate. Eyes:      General: No scleral icterus. Right eye: No discharge. Left eye: No discharge. Conjunctiva/sclera: Conjunctivae normal.      Pupils: Pupils are equal, round, and reactive to light. Neck:      Thyroid: No thyromegaly. Cardiovascular:      Rate and Rhythm: Normal rate and regular rhythm. Heart sounds: Normal heart sounds. No murmur heard. No friction rub. No gallop. Pulmonary:      Effort: Pulmonary effort is normal. No respiratory distress. Breath sounds: Normal breath sounds. No wheezing or rales. Chest:      Chest wall: No tenderness. Abdominal:      General: Bowel sounds are normal. There is no distension. Palpations: Abdomen is soft. Tenderness: There is no abdominal tenderness. There is no guarding or rebound. Musculoskeletal:      Cervical back: Neck supple. Spasms and tenderness present. No edema, erythema or rigidity. No muscular tenderness. Decreased range of motion. Thoracic back: Tenderness present. Lumbar back: Tenderness present. Decreased range of motion. Negative right straight leg raise test and negative left straight leg raise test.   Skin:     General: Skin is warm. Coloration: Skin is not pale. Findings: No erythema or rash. Neurological:      Mental Status: She is alert and oriented to person, place, and time. She is not disoriented. Cranial Nerves: No cranial nerve deficit. Sensory: No sensory deficit. Motor: No atrophy or abnormal muscle tone. Coordination: Coordination normal.      Gait: Gait normal.      Deep Tendon Reflexes: Reflexes are normal and symmetric. Babinski sign absent on the right side. Psychiatric:         Attention and Perception: Attention normal. She is attentive. Mood and Affect: Mood normal. Mood is not anxious or depressed. Affect is not labile, blunt, angry or inappropriate. Speech: Speech normal. She is communicative. Speech is not rapid and pressured, delayed, slurred or tangential.         Behavior: Behavior normal. Behavior is not agitated, slowed, aggressive, withdrawn, hyperactive or combative. Behavior is cooperative. Thought Content:  Thought content normal. Thought content is not paranoid or delusional. Thought content does not include homicidal or suicidal ideation. Thought content does not include homicidal or suicidal plan. Cognition and Memory: Cognition normal. Memory is not impaired. She does not exhibit impaired recent memory or impaired remote memory. Judgment: Judgment normal. Judgment is not impulsive or inappropriate. MARIA LUISA  Patricks test  positive  Yeoman's or Gaenslen's  positive  Kemps  positive  Spurlings  na       Assessment:     1. Lumbar spondylosis    2. Lumbar facet arthropathy    3. Thoracic spondylosis    4. Neck pain    5. Cervical radiculitis    6. Chronic pain syndrome            Plan:      Patient read and signed orientation and opioid agreement. OARRS reviewed. Current MED: 20  Patient was not offered naloxone for home. Discussed long term side effects of medications, tolerance, dependency and addiction. UDS preformed today. Patient told can not receive any pain medications from any other source. No evidence of abuse, diversion or aberrant behavior. Prescription Needs: Will prescribe patient a short length of pain medications in good [place]ess[place] until UDS come back  Medications and/or procedures to improve function and quality of life- patient understanding with this and that may not be pain free  Discussed possible weaning of medication dosing dependent on treatment/procedure results. Discussed with patient about safe storage of medications at home  Testing or Radiology Reviewed:  Reviewed CT Lumbar Spine with patient. Labs:   Procedures: Bilateral L-facet MBB @ L4-5 and L5-S1   Discussed with patient about risks with procedure including infection, reaction to medication, increased pain, or bleeding. Medications:  LAST DOSE OF NORCO WAS IN THE MORNING TODAY. If patient is on blood thinners will need approval to hold: yes    ASA, Plavix  Does patient have implanted device Stimulator, AICD or Pacemaker etc? No          Meds. Prescribed:   Orders Placed This Encounter   Medications    HYDROcodone-acetaminophen (NORCO) 5-325 MG per tablet     Sig: Take 1 tablet by mouth every 6 hours as needed for Pain for up to 15 days. Take lowest dose possible to manage pain Max Daily Amount: 4 tablets     Dispense:  60 tablet     Refill:  0     Reduce doses taken as pain becomes manageable         Return Bilateral L-facet MBB @ L4-5 and L5-S1.          Electronically signed by Alejandro Gibbs MD on 2/16/2023 at 3:47 PM

## 2023-02-16 NOTE — LETTER
[address] [facility] [address] [facility]  Phone: [phone]  Fax: [phone]           Luis Alberto Zaidi MD      February 16, 2023     Patient: Chip Cabrales   MR Number: [id number]   YOB: 1960   Date of Visit: 2/16/2023       Dear Dr. Andrew Severino: Thank you for referring Chip Cabrales to me for evaluation/treatment. Below are the relevant portions of my assessment and plan of care. Assessment:     1. Lumbar spondylosis    2. Lumbar facet arthropathy    3. Thoracic spondylosis    4. Neck pain    5. Cervical radiculitis    6. Chronic pain syndrome        Plan:      · Patient read and signed orientation and opioid agreement. · OARRS reviewed. Current MED: 20  · Patient was not offered naloxone for home. · Discussed long term side effects of medications, tolerance, dependency and addiction. · UDS preformed today. · Patient told can not receive any pain medications from any other source. · No evidence of abuse, diversion or aberrant behavior. · Prescription Needs: Will prescribe patient a short length of pain medications in [place] jess[place] until UDS come back   Medications and/or procedures to improve function and quality of life- patient understanding with this and that may not be pain free   Discussed possible weaning of medication dosing dependent on treatment/procedure results.  Discussed with patient about safe storage of medications at home   Testing or Radiology Reviewed:  Reviewed CT Lumbar Spine with patient.  Labs:    Procedures: Bilateral L-facet MBB @ L4-5 and L5-S1    Discussed with patient about risks with procedure including infection, reaction to medication, increased pain, or bleeding.  Medications:  LAST DOSE OF NORCO WAS IN THE MORNING TODAY.    If patient is on blood thinners will need approval to hold: yes     ASA, Plavix   Does patient have implanted device Stimulator, AICD or Pacemaker etc? No          Meds. Prescribed:   Orders Placed This Encounter   Medications    HYDROcodone-acetaminophen (NORCO) 5-325 MG per tablet     Sig: Take 1 tablet by mouth every 6 hours as needed for Pain for up to 15 days. Take lowest dose possible to manage pain Max Daily Amount: 4 tablets     Dispense:  60 tablet     Refill:  0     Reduce doses taken as pain becomes manageable         Return Bilateral L-facet MBB @ L4-5 and L5-S1. If you have questions, please do not hesitate to call me. I look forward to following Christine along with you.     Sincerely,        Ca Durand MD    CC providers:    No Recipients

## 2023-02-16 NOTE — Clinical Note
[address] [facility]  [address] [facility] [address] [place] [address]  Phone: [phone]  Fax: [phone]    Judie Francis MD        February 16, 2023     Patient: Bhakti Douglass   YOB: 1960   Date of Visit: 2/16/2023       To Whom It May Concern: It is my medical opinion that Bhakti Douglass {Work release (duty restriction):34118}. If you have any questions or concerns, please don't hesitate to call.     Sincerely,        Judie Francis MD

## 2023-02-16 NOTE — LETTER
[id number] [facility] [address] [place] [address] [place]  Phone: [phone]  Fax: [phone]           Gregory Rasmussen MD      February 16, 2023     Patient: Fidelia Sepulveda   MR Number: [id number]   YOB: 1960   Date of Visit: 2/16/2023       Dear Dr. Konrad Loza: Thank you for referring Fidelia Sepulveda to me for evaluation/treatment. Below are the relevant portions of my assessment and plan of care. Assessment:     1. Lumbar spondylosis    2. Lumbar facet arthropathy    3. Thoracic spondylosis    4. Neck pain    5. Cervical radiculitis    6. Chronic pain syndrome        Plan:      · Patient read and signed orientation and opioid agreement. · OARRS reviewed. Current MED: 20  · Patient was not offered naloxone for home. · Discussed long term side effects of medications, tolerance, dependency and addiction. · UDS preformed today. · Patient told can not receive any pain medications from any other source. · No evidence of abuse, diversion or aberrant behavior. · Prescription Needs: Will prescribe patient a short length of pain medications in [place]ess[place] until UDS come back   Medications and/or procedures to improve function and quality of life- patient understanding with this and that may not be pain free   Discussed possible weaning of medication dosing dependent on treatment/procedure results.  Discussed with patient about safe storage of medications at home   Testing or Radiology Reviewed:  Reviewed CT Lumbar Spine with patient.  Labs:    Procedures: Bilateral L-facet MBB @ L4-5 and L5-S1    Discussed with patient about risks with procedure including infection, reaction to medication, increased pain, or bleeding.  Medications:  LAST DOSE OF NORCO WAS IN THE MORNING TODAY.    If patient is on blood thinners will need approval to hold: yes     ASA, Plavix   Does patient have implanted device Stimulator, AICD or Pacemaker etc? No          Meds. Prescribed:   Orders Placed This Encounter   Medications    HYDROcodone-acetaminophen (NORCO) 5-325 MG per tablet     Sig: Take 1 tablet by mouth every 6 hours as needed for Pain for up to 15 days. Take lowest dose possible to manage pain Max Daily Amount: 4 tablets     Dispense:  60 tablet     Refill:  0     Reduce doses taken as pain becomes manageable         Return Bilateral L-facet MBB @ L4-5 and L5-S1. If you have questions, please do not hesitate to call me. I look forward to following Christine along with you.     Sincerely,        Benjamine Castleman, MD    CC providers:    No Recipients

## 2023-02-16 NOTE — LETTER
[address] [facility] [address] [place] [address]  Phone: [phone]  Fax: [phone]           Benjamine Castleman, MD      February 16, 2023     Patient: Padmini Dolan   MR Number: [id number]   YOB: 1960   Date of Visit: 2/16/2023       Dear Dr. Keith Ordoñez: Thank you for referring Padmini Dolan to me for evaluation/treatment. Below are the relevant portions of my assessment and plan of care. Assessment:     1. Lumbar spondylosis    2. Lumbar facet arthropathy    3. Thoracic spondylosis    4. Neck pain    5. Cervical radiculitis    6. Chronic pain syndrome        Plan:      Patient read and signed orientation and opioid agreement. OARRS reviewed. Current MED: 20  Patient was not offered naloxone for home. Discussed long term side effects of medications, tolerance, dependency and addiction. UDS preformed today. Patient told can not receive any pain medications from any other source. No evidence of abuse, diversion or aberrant behavior. Prescription Needs: Will prescribe patient a short length of pain medications in [place] until UDS come back  Medications and/or procedures to improve function and quality of life- patient understanding with this and that may not be pain free  Discussed possible weaning of medication dosing dependent on treatment/procedure results. Discussed with patient about safe storage of medications at home  Testing or Radiology Reviewed:  Reviewed CT Lumbar Spine with patient. Labs:   Procedures: Bilateral L-facet MBB @ L4-5 and L5-S1   Discussed with patient about risks with procedure including infection, reaction to medication, increased pain, or bleeding. Medications:  LAST DOSE OF NORCO WAS IN THE MORNING TODAY.   If patient is on blood thinners will need approval to hold: yes    ASA, Plavix  Does patient have implanted device Stimulator, AICD or Pacemaker etc? No          Meds. Prescribed:   Orders Placed This Encounter   Medications    HYDROcodone-acetaminophen (NORCO) 5-325 MG per tablet     Sig: Take 1 tablet by mouth every 6 hours as needed for Pain for up to 15 days. Take lowest dose possible to manage pain Max Daily Amount: 4 tablets     Dispense:  60 tablet     Refill:  0     Reduce doses taken as pain becomes manageable         Return Bilateral L-facet MBB @ L4-5 and L5-S1. If you have questions, please do not hesitate to call me. I look forward to following Christine along with you. Sincerely,        Ang Perez MD    CC providers:  Benny Coreas MD  [address]  [address] [place]

## 2023-02-16 NOTE — LETTER
[address] [facility] [address] [place]  Phone: [phone]  Fax: [phone]    Judie Francis MD    February 16, 2023     Everett Villa MD  [facility] [address]    Patient: Bhakti Douglass   MR Number: [id number]   YOB: 1960   Date of Visit: 2/16/2023       Dear Maria Bass: Thank you for referring Bhakti Douglass to me for evaluation/treatment. Below are the relevant portions of my assessment and plan of care. Assessment:     1. Lumbar spondylosis    2. Lumbar facet arthropathy    3. Thoracic spondylosis    4. Neck pain    5. Cervical radiculitis    6. Chronic pain syndrome        Plan:      Patient read and signed orientation and opioid agreement. OARRS reviewed. Current MED: 20  Patient was not offered naloxone for home. Discussed long term side effects of medications, tolerance, dependency and addiction. UDS preformed today. Patient told can not receive any pain medications from any other source. No evidence of abuse, diversion or aberrant behavior. Prescription Needs: Will prescribe patient a short length of pain medications in good [place]ess[place] until UDS come back  Medications and/or procedures to improve function and quality of life- patient understanding with this and that may not be pain free  Discussed possible weaning of medication dosing dependent on treatment/procedure results. Discussed with patient about safe storage of medications at home  Testing or Radiology Reviewed:  Reviewed CT Lumbar Spine with patient. Labs:   Procedures: Bilateral L-facet MBB @ L4-5 and L5-S1   Discussed with patient about risks with procedure including infection, reaction to medication, increased pain, or bleeding. Medications:  LAST DOSE OF NORCO WAS IN THE MORNING TODAY.   If patient is on blood thinners will need approval to hold: yes    ASA, Plavix  Does patient have implanted device Stimulator, AICD or Pacemaker etc? No          Meds. Prescribed:   Orders Placed This Encounter   Medications    HYDROcodone-acetaminophen (NORCO) 5-325 MG per tablet     Sig: Take 1 tablet by mouth every 6 hours as needed for Pain for up to 15 days. Take lowest dose possible to manage pain Max Daily Amount: 4 tablets     Dispense:  60 tablet     Refill:  0     Reduce doses taken as pain becomes manageable         Return Bilateral L-facet MBB @ L4-5 and L5-S1. If you have questions, please do not hesitate to call me. I look forward to following Christine along with you.     Sincerely,      Toshia Zimmerman MD

## 2023-03-03 DIAGNOSIS — M47.816 LUMBAR SPONDYLOSIS: Primary | ICD-10-CM

## 2023-03-03 RX ORDER — HYDROCODONE BITARTRATE AND ACETAMINOPHEN 5; 325 MG/1; MG/1
1 TABLET ORAL EVERY 8 HOURS PRN
Qty: 45 TABLET | Refills: 0 | Status: SHIPPED | OUTPATIENT
Start: 2023-03-03 | End: 2023-03-18

## 2023-03-10 NOTE — DISCHARGE INSTRUCTIONS
ANESTHESIA INSTRUCTIONS FOLLOWING SURGERY        Since you may experience some intermittent light-headedness for the next several hours, we suggest you plan on bed rest or quiet relaxation this evening. You must have a friend or relative stay with you tonight. Because of the sedation you have received, it is recommended that you do not drive a motor vehicle, operate any kind of machinery, or sign any contractual agreement for 24 hours following the procedure. You should not take alcoholic beverages tonight and only take sleeping medication that has been specifically prescribed for you by your physician. Call office [phone] if you have:  Temperature greater than 100.4  Persistent nausea and vomiting  Severe uncontrolled pain  Redness, tenderness, or signs of infection (pain, swelling, redness, odor or green/yellow discharge around the site)  Difficulty breathing, headache or visual disturbances  Hives  Persistent dizziness or light-headedness  Extreme fatigue  Any other questions or concerns you may have after discharge    In an emergency, call 911 or go to an Emergency Department at a nearby hospital    It is important to bring a complete, current list of your medications to any medical appointments or hospitalizations. REMINDER:   Carry a list of your medications and allergies with you at all times  Call your pharmacy at least 1 week in advance to refill prescriptions    Diet: Resume your usual diet. Good nutrition promotes healing. Increase fluid intake. Activity: Rest for 24 hrs then resume normal activity. HOME MEDICATIONS:      If on blood thinning products such as; Aspirin, NSAIDS, Plavix, Coumadin, Xarelto, Fish Oil, Multi-Vitamins or Herbal Supplements restart in 24 hours      Restart Metformin in 48 hours if you had procedure with dye. Restart Metformin in 24 hours if no dye used during procedure.         Education Materials Received: {yes/no:397633}  Belongings Returned: {yes/no:[id number]}          I understand and acknowledge receipt of the above instructions. Patient or Guardian Signature                                                         Date/Time                                                                                                                                            Physician's or R.N.'s Signature                                                                  Date/Time      The discharge instructions have been reviewed with the patient and/or Guardian. Patient and/or Guardian signed and retained a printed copy. Surgical Site Infections        How can we work together to prevent Surgical Site Infections? We would like to thank you for choosing [facility] for your Surgical Care. Below you will find helpful information on how we can work together to prevent Surgical Site Infections. What is a Surgical Site Infection (SSI)? A surgical site infection is an infection that occurs after surgery in the part of the body where the surgery took place. Most patients who have surgery do not develop an infection. However, infections develop in about 1 to 3 out of every 100 patients who have surgery. Some of the common symptoms of a surgical site infection are:  Redness and pain around the area where you had surgery  Drainage of cloudy fluid from your surgical wound  Fever    Can SSIs be treated? Yes. Most surgical site infections can be treated with antibiotics. The antibiotic given to you depends on the bacteria (germs) causing the infection. Sometimes patients with SSIs also need another surgery to treat the infection. What are some of the things that hospitals are doing to prevent SSIs? To prevent SSIs, doctors, nurses, and other healthcare providers:   May remove some of your hair immediately before your surgery using electric clippers if the hair is in the same area where the procedure will occur. They should not shave you with a razor.  Give you antibiotics before your surgery starts. In most cases, you should get antibiotics within 60 minutes before the surgery starts and the antibiotics should be stopped within 24 hours after surgery.  Clean the skin at the site of your surgery with a special soap that kills germs.  Clean their hands and arms up to their elbows with an antiseptic agent just before the surgery.  Wear special hair covers, masks, gowns, and gloves during surgery to  keep the surgery area clean.  Clean their hands with soap and water or an alcohol-based hand rub before and after caring for each patient.             If you do not see your providers clean their hands, please     ask  them to do so.     What can I do to help prevent SSIs?  Before your surgery:  Tell your doctor about other medical problems you may have.  Health problems such as allergies, diabetes, and obesity could affect your surgery and your treatment.   Quit smoking. Patients who smoke get more infections. Talk to your doctor about how you can quit before your surgery.  Do not shave near where you will have surgery. Shaving with a razor can irritate your skin and make it easier to develop an infection.  At the time of your surgery:  Speak up if someone tries to shave you with a razor before surgery. Ask why you need to be shaved and talk with your surgeon if you have any concerns.  Ask if you will get antibiotics before surgery.  After your surgery:  Make sure that your healthcare providers clean their hands before examining you, either with soap and water or an alcohol-based hand rub.  Family and friends who visit you should not touch the surgical wound or dressings.  Family and friends should clean their hands with soap and water or an alcohol-based hand rub before and after visiting you. If you do not see them clean their hands,  ask them to clean their hands. What do I need to do when I go home from the hospital?  Before you go home, your doctor or nurse should explain everything you need to know about taking care of your wound. Make sure you understand how to care for your wound before you leave the hospital.  Always clean your hands before and after caring for your wound. Before you go home, make sure you know who to contact if you have questions or problems after you get home. If you have any symptoms of an infection, such as redness and pain at the surgery site, drainage, or fever, call your doctor immediately. If you have additional questions, please ask your doctor or nurse. Call office [phone] if you have:  Temperature greater than 100.4  Persistent nausea and vomiting  Severe uncontrolled pain  Redness, tenderness, or signs of infection (pain, swelling, redness, odor or green/yellow discharge around the site)  Difficulty breathing, headache or visual disturbances  Hives  Persistent dizziness or light-headedness  Extreme fatigue  Any other questions or concerns you may have after discharge    In an emergency, call 911 or go to an Emergency Department at a nearby hospital    It is important to bring a complete, current list of your medications to any medical appointments or hospitalizations. REMINDER:   Carry a list of your medications and allergies with you at all times  Call your pharmacy at least 1 week in advance to refill prescriptions    Diet: Resume your usual diet. Good nutrition promotes healing. Increase fluid intake. Activity: Rest for 24 hrs then resume normal activity. Education Materials Received: {yes/no:[id number]}  Belongings Returned: {yes/no:[id number]}          I understand and acknowledge receipt of the above instructions.                                                                                                                                           Patient or Guardian Signature                                                         Date/Time                                                                                                                                            Physician's or R.N.'s Signature                                                                  Date/Time      The discharge instructions have been reviewed with the patient and/or Guardian. Patient and/or Guardian signed and retained a printed copy.

## 2023-03-16 ENCOUNTER — PREP FOR PROCEDURE (OUTPATIENT)
Dept: PHYSICAL MEDICINE AND REHAB | Age: 63
End: 2023-03-16

## 2023-03-17 ENCOUNTER — ANESTHESIA (OUTPATIENT)
Dept: OPERATING ROOM | Age: 63
End: 2023-03-17
Payer: COMMERCIAL

## 2023-03-17 ENCOUNTER — APPOINTMENT (OUTPATIENT)
Dept: GENERAL RADIOLOGY | Age: 63
End: 2023-03-17
Attending: PAIN MEDICINE
Payer: COMMERCIAL

## 2023-03-17 ENCOUNTER — ANESTHESIA EVENT (OUTPATIENT)
Dept: OPERATING ROOM | Age: 63
End: 2023-03-17
Payer: COMMERCIAL

## 2023-03-17 ENCOUNTER — HOSPITAL ENCOUNTER (OUTPATIENT)
Age: 63
Setting detail: OUTPATIENT SURGERY
Discharge: HOME | End: 2023-03-17
Attending: PAIN MEDICINE | Admitting: PAIN MEDICINE
Payer: COMMERCIAL

## 2023-03-17 VITALS
TEMPERATURE: 96.3 F | OXYGEN SATURATION: 94 % | RESPIRATION RATE: 16 BRPM | HEART RATE: 98 BPM | HEIGHT: 62 IN | SYSTOLIC BLOOD PRESSURE: 122 MMHG | DIASTOLIC BLOOD PRESSURE: 68 MMHG | WEIGHT: 171 LBS | BODY MASS INDEX: 31.47 KG/M2

## 2023-03-17 DIAGNOSIS — M47.817 LUMBOSACRAL SPONDYLOSIS WITHOUT MYELOPATHY: Primary | ICD-10-CM

## 2023-03-17 PROBLEM — M47.816 LUMBAR SPONDYLOSIS: Status: ACTIVE | Noted: 2023-03-17

## 2023-03-17 LAB — GLUCOSE BLD STRIP.AUTO-MCNC: 239 MG/DL (ref 70–108)

## 2023-03-17 PROCEDURE — 2709999900 HC NON-CHARGEABLE SUPPLY: Performed by: PAIN MEDICINE

## 2023-03-17 PROCEDURE — 3700000000 HC ANESTHESIA ATTENDED CARE: Performed by: PAIN MEDICINE

## 2023-03-17 PROCEDURE — 7100000011 HC PHASE II RECOVERY - ADDTL 15 MIN: Performed by: PAIN MEDICINE

## 2023-03-17 PROCEDURE — 82948 REAGENT STRIP/BLOOD GLUCOSE: CPT

## 2023-03-17 PROCEDURE — 2500000003 HC RX 250 WO HCPCS: Performed by: PAIN MEDICINE

## 2023-03-17 PROCEDURE — 3209999900 FLUORO FOR SURGICAL PROCEDURES

## 2023-03-17 PROCEDURE — 6370000000 HC RX 637 (ALT 250 FOR IP): Performed by: ANESTHESIOLOGY

## 2023-03-17 PROCEDURE — 3600000056 HC PAIN LEVEL 4 BASE: Performed by: PAIN MEDICINE

## 2023-03-17 PROCEDURE — 2500000003 HC RX 250 WO HCPCS: Performed by: NURSE ANESTHETIST, CERTIFIED REGISTERED

## 2023-03-17 PROCEDURE — 6360000002 HC RX W HCPCS: Performed by: PAIN MEDICINE

## 2023-03-17 PROCEDURE — 3600000057 HC PAIN LEVEL 4 ADDL 15 MIN: Performed by: PAIN MEDICINE

## 2023-03-17 PROCEDURE — 3700000001 HC ADD 15 MINUTES (ANESTHESIA): Performed by: PAIN MEDICINE

## 2023-03-17 PROCEDURE — 6360000002 HC RX W HCPCS: Performed by: NURSE ANESTHETIST, CERTIFIED REGISTERED

## 2023-03-17 PROCEDURE — 7100000010 HC PHASE II RECOVERY - FIRST 15 MIN: Performed by: PAIN MEDICINE

## 2023-03-17 RX ORDER — INSULIN LISPRO 100 [IU]/ML
0-4 INJECTION, SOLUTION INTRAVENOUS; SUBCUTANEOUS NIGHTLY
Status: DISCONTINUED | OUTPATIENT
Start: 2023-03-17 | End: 2023-03-17 | Stop reason: HOSPADM

## 2023-03-17 RX ORDER — PROPOFOL 10 MG/ML
INJECTION, EMULSION INTRAVENOUS PRN
Status: DISCONTINUED | OUTPATIENT
Start: 2023-03-17 | End: 2023-03-17 | Stop reason: SDUPTHER

## 2023-03-17 RX ORDER — BUPIVACAINE HYDROCHLORIDE 2.5 MG/ML
INJECTION, SOLUTION EPIDURAL; INFILTRATION; INTRACAUDAL PRN
Status: DISCONTINUED | OUTPATIENT
Start: 2023-03-17 | End: 2023-03-17 | Stop reason: ALTCHOICE

## 2023-03-17 RX ORDER — HYDROCODONE BITARTRATE AND ACETAMINOPHEN 5; 325 MG/1; MG/1
1 TABLET ORAL EVERY 8 HOURS PRN
Qty: 45 TABLET | Refills: 0 | Status: SHIPPED | OUTPATIENT
Start: 2023-03-18 | End: 2023-04-02

## 2023-03-17 RX ORDER — LIDOCAINE HYDROCHLORIDE 20 MG/ML
INJECTION, SOLUTION EPIDURAL; INFILTRATION; INTRACAUDAL; PERINEURAL PRN
Status: DISCONTINUED | OUTPATIENT
Start: 2023-03-17 | End: 2023-03-17 | Stop reason: SDUPTHER

## 2023-03-17 RX ORDER — METHYLPREDNISOLONE ACETATE 80 MG/ML
INJECTION, SUSPENSION INTRA-ARTICULAR; INTRALESIONAL; INTRAMUSCULAR; SOFT TISSUE PRN
Status: DISCONTINUED | OUTPATIENT
Start: 2023-03-17 | End: 2023-03-17 | Stop reason: ALTCHOICE

## 2023-03-17 RX ORDER — LIDOCAINE HYDROCHLORIDE 10 MG/ML
INJECTION, SOLUTION INFILTRATION; PERINEURAL PRN
Status: DISCONTINUED | OUTPATIENT
Start: 2023-03-17 | End: 2023-03-17 | Stop reason: ALTCHOICE

## 2023-03-17 RX ORDER — INSULIN LISPRO 100 [IU]/ML
0-8 INJECTION, SOLUTION INTRAVENOUS; SUBCUTANEOUS
Status: DISCONTINUED | OUTPATIENT
Start: 2023-03-17 | End: 2023-03-17 | Stop reason: HOSPADM

## 2023-03-17 RX ADMIN — PROPOFOL 40 MG: 10 INJECTION, EMULSION INTRAVENOUS at 13:05

## 2023-03-17 RX ADMIN — PROPOFOL 50 MG: 10 INJECTION, EMULSION INTRAVENOUS at 13:02

## 2023-03-17 RX ADMIN — LIDOCAINE HYDROCHLORIDE 40 MG: 20 INJECTION, SOLUTION EPIDURAL; INFILTRATION; INTRACAUDAL; PERINEURAL at 13:01

## 2023-03-17 RX ADMIN — PROPOFOL 50 MG: 10 INJECTION, EMULSION INTRAVENOUS at 13:04

## 2023-03-17 RX ADMIN — Medication 2 UNITS: at 12:21

## 2023-03-17 ASSESSMENT — PAIN SCALES - GENERAL: PAINLEVEL_OUTOF10: 0

## 2023-03-17 NOTE — PROGRESS NOTES
1312: Patient to Phase II Recovery via bed in stable condition on room air. Patient awake & alert at this time. 1315: Patient requesting snack. 1321: Patient resting.     1323: IV removed. No complications. 1326: Patient getting dressed. 1333: Patient discharged.
Discharge instructions given with assistance of status line . Pt. Verbalized understanding of instructions,  Blood sugar 239. Dr. Kinga Kohler notified and orders received. Insulin given per order.
normal...

## 2023-03-17 NOTE — ANESTHESIA POSTPROCEDURE EVALUATION
Department of Anesthesiology  Postprocedure Note    Patient: Bryn Morton  MRN: [id number]  YOB: 1960  Date of evaluation: 3/17/2023      Procedure Summary     Date: 03/17/23 Room / Location: [address]    Anesthesia Start: 8620 Anesthesia Stop: 8695    Procedure: Bilateral Lumbar facet medial Branch Block Lumbar 4-5, 5-Sacral 1. (Bilateral: Back) Diagnosis:       Lumbar spondylosis      (Lumbar spondylosis)    Surgeons: Lily Zaidi MD Responsible Provider: Brenda Kemp MD    Anesthesia Type: MAC ASA Status: 3          Anesthesia Type: No value filed.     Mariano Phase I:      Mariano Phase II: Mariano Score: 10      Anesthesia Post Evaluation    Patient location during evaluation: bedside  Patient participation: complete - patient participated  Level of consciousness: awake  Airway patency: patent  Nausea & Vomiting: no vomiting and no nausea  Complications: no  Cardiovascular status: hemodynamically stable  Respiratory status: acceptable  Hydration status: stable

## 2023-03-17 NOTE — ANESTHESIA PRE PROCEDURE
Department of Anesthesiology  Preprocedure Note       Name:  Christine Souza   Age:  63 y.o.  :  1960                                          MRN:  [id number]         Date:  3/17/2023      Surgeon: Surgeon(s):  Mike Kenney MD    Procedure: Procedure(s):  Bilateral Lumbar facet medial Branch Block Lumbar 4-5, 5-Sacral 1.    Medications prior to admission:   Prior to Admission medications    Medication Sig Start Date End Date Taking? Authorizing Provider   HYDROcodone-acetaminophen (NORCO) 5-325 MG per tablet Take 1 tablet by mouth every 8 hours as needed for Pain for up to 15 days. Take lowest dose possible to manage pain Max Daily Amount: 3 tablets 3/3/23 3/18/23  Mike Kenney MD   Dulaglutide (TRULICITY SC) Inject into the skin    Historical Provider, MD   Continuous Blood Gluc  (FREESTYLE CARMENCITA 2 READER) LUCY 1 kit by Does not apply route 4 times daily 22   Russell Parikh MD   Continuous Blood Gluc Sensor (FREESTYLE CARMENCITA 2 SENSOR) MISC 1 kit by Does not apply route 4 times daily 22   Russell Parikh MD   Continuous Blood Gluc Sensor (FREESTYLE CARMENCITA 14 DAY SENSOR) MISC 1 kit by Does not apply route 4 times daily 22   Russell Parikh MD   Continuous Blood Gluc  (FREESTYLE CARMENCITA 14 DAY READER) LUCY 1 kit by Does not apply route 4 times daily 22   Russell Parikh MD   metoprolol tartrate (LOPRESSOR) 25 MG tablet Take 1 tablet by mouth 2 times daily 22   Mike Edwards PA-C   clopidogrel (PLAVIX) 75 MG tablet Take 1 tablet by mouth daily 22   Mike Edwards PA-C   insulin glargine (LANTUS SOLOSTAR) 100 UNIT/ML injection pen Inject 45 Units into the skin 2 times daily  Patient taking differently: Inject 15 Units into the skin nightly 22   Mike Edwards PA-C   atorvastatin (LIPITOR) 40 MG tablet Take 1 tablet by mouth in the morning. 8/10/22 12/8/22  Helena Yan MD   aspirin EC 81 MG EC tablet Take 1 tablet by  mouth in the morning. 8/10/22   Helena Yan MD   losartan (COZAAR) 50 MG tablet Take 50 mg by mouth daily  8/19/22  Historical Provider, MD   Insulin Pen Needle (B-D ULTRAFINE III SHORT PEN) 31G X 8 MM MISC 1 each by Does not apply route daily 10/17/16   Alejandro Bass MD   meloxicam (MOBIC) 15 MG tablet Take 1 tablet by mouth daily 10/17/16 8/10/22  Alejandro Bass MD   metFORMIN (GLUCOPHAGE) 1000 MG tablet Take 1 tablet by mouth 2 times daily (with meals) 7/19/16 8/24/22  Alejandro Bass MD   glucose blood VI test strips (FREESTYLE LITE) strip Apply 1 each topically three times daily 4/20/16   Alejandro Bass MD   FREESTYLE LANCETS MISC Apply 1 each topically 3 times daily. 2/3/14   Alejandro Bass MD   glucose monitoring kit (FREESTYLE) monitoring kit Use as directed 2/3/14   Alejandro Bass MD       Current medications:    No current facility-administered medications for this encounter.       Allergies:    Allergies   Allergen Reactions   • Tramadol Nausea And Vomiting       Problem List:    Patient Active Problem List   Diagnosis Code   • GERD (gastroesophageal reflux disease) K21.9   • Acquired spondylolisthesis M43.10   • Spinal stenosis, lumbar region, with neurogenic claudication M48.062   • Tobacco abuse Z72.0   • Arthralgia of right hand M25.541   • HTN (hypertension) I10   • Type 1 diabetes mellitus (HCC) E10.9   • Insomnia G47.00   • Hyperlipidemia E78.5   • Onychomycosis of toenail B35.1   • CAD in native artery I25.10   • Abnormal stress test R94.39   • Other fatigue R53.83   • CAD, multiple vessel I25.10   • S/P CABG x 3 Z95.1       Past Medical History:        Diagnosis Date   • CAD in native artery 8/10/2022   • DDD (degenerative disc disease)    • Frozen shoulder     Left with arthritis   • GERD (gastroesophageal reflux disease)    • HTN (hypertension) 10/31/2013   • Hyperlipidemia 2/3/2014   • Lumbar radiculopathy    • Obesity    Osteoarthritis     Tobacco abuse     Type II or unspecified type diabetes mellitus without mention of complication, not stated as uncontrolled        Past Surgical History:        Procedure Laterality Date     SECTION      x3    CORONARY ARTERY BYPASS GRAFT N/A 2022    CABGx3 / JODY performed by Taniya Brown MD at [address] [place] [address]      left and right    TUBAL LIGATION         Social History:    Social History     Tobacco Use    Smoking status: Every Day     Packs/day: 1.00     Years: 30.00     Pack years: 30.00     Types: Cigarettes    Smokeless tobacco: Never   Substance Use Topics    Alcohol use: No     Alcohol/week: 0.0 standard drinks                                Ready to quit: Not Answered  Counseling given: Not Answered      Vital Signs (Current): There were no vitals filed for this visit.                                            BP Readings from Last 3 Encounters:   23 128/72   22 119/69   22 (!) 90/58       NPO Status:                                                                                 BMI:   Wt Readings from Last 3 Encounters:   23 165 lb (74.8 kg)   22 165 lb 6.4 oz (75 kg)   22 162 lb (73.5 kg)     There is no height or weight on file to calculate BMI.    CBC:   Lab Results   Component Value Date/Time    WBC 8.1 09/15/2022 10:52 AM    RBC 3.85 09/15/2022 10:52 AM    HGB 11.1 09/15/2022 10:52 AM    HCT 35.9 09/15/2022 10:52 AM    MCV 93.2 09/15/2022 10:52 AM    RDW 13.0 2022 12:52 PM     09/15/2022 10:52 AM       CMP:   Lab Results   Component Value Date/Time     09/15/2022 10:52 AM    K 4.6 09/15/2022 10:52 AM     09/15/2022 10:52 AM    CO2 29 09/15/2022 10:52 AM    BUN 26 09/15/2022 10:52 AM    CREATININE 1.6 09/15/2022 10:52 AM    LABGLOM 33 09/15/2022 10:52 AM    GLUCOSE 165 09/15/2022 10:52 AM    GLUCOSE 157 2022 12:52 PM    PROT 7.6 2022 08:25 AM    CALCIUM 9.3 09/15/2022 10:52 AM    BILITOT <0.2 12/12/2022 08:25 AM    ALKPHOS 77 12/12/2022 08:25 AM    AST 18 12/12/2022 08:25 AM    ALT 18 12/12/2022 08:25 AM       POC Tests: No results for input(s): POCGLU, POCNA, POCK, POCCL, POCBUN, POCHEMO, POCHCT in the last 72 hours. Coags:   Lab Results   Component Value Date/Time    INR 1.16 08/20/2022 05:30 AM    APTT 31.8 08/10/2022 05:47 AM       HCG (If Applicable): No results found for: PREGTESTUR, PREGSERUM, HCG, HCGQUANT     ABGs: No results found for: PHART, PO2ART, QZB7NKZ, JGR5ETK, BEART, Y7NMNDIP     Type & Screen (If Applicable):  Lab Results   Component Value Date    LABRH POS 08/19/2022       Drug/Infectious Status (If Applicable):  No results found for: HIV, HEPCAB    COVID-19 Screening (If Applicable): No results found for: COVID19        Anesthesia Evaluation    Airway: Mallampati: II  TM distance: >3 FB   Neck ROM: full  Mouth opening: > = 3 FB   Dental:          Pulmonary:   (+) decreased breath sounds                            Cardiovascular:    (+) hypertension:, CAD:, CABG/stent:,         Rhythm: regular                      Neuro/Psych:               GI/Hepatic/Renal:   (+) GERD:,           Endo/Other:    (+) DiabetesType I DM, , .                 Abdominal:   (+) obese,           Vascular: Other Findings:           Anesthesia Plan      MAC     ASA 3     (Communicated with pt. Through )  Induction: intravenous. Anesthetic plan and risks discussed with patient. Plan discussed with CRNA.                     Dennis Juarez MD   3/17/2023

## 2023-03-17 NOTE — H&P
[facility]  History and Physical Update    Pt Name: Guido Felix  MRN: [id number]  YOB: 1960  Date of evaluation: 3/17/2023      I have examined the patient and reviewed the H&P/Consult and there are no changes to the patient or plans.         Electronically signed by Armand Carreon MD on 3/17/2023 at 11:55 AM

## 2023-03-17 NOTE — H&P
H&P    Patient is a  60 y/o  lady with complaints of cervical and lumbar area. States cervical spine pain started around 2 years ago. States pain had jose david worse secondary taking care of her mother with Parkinson. Patient did see Dr Artis Atwood for evaluation of her cervical area and recommend surgery. The surgery was cancelled secondary was found CAD. Patient required cardiac surgery August/2022 at [facility]. Patient states still healed from this surgery. Patient denies any falls or previous cervical surgeries. Pain is worse with house activity, getting up and down, turning neck, push and pulling, States has numbness BUE and sometimes weakness. PT: Yes,  any benefit? No, how many weeks? 6, last date done: last year  Any prior spine or ortho surgeon consult and with whom Yes        Patient states has at least 7 years of lower back pain. Patient denies any falls or prior lumbar surgeries.  has referred several years ago to Dr Rajwinder Mackay, had injection no benefit. Patient does not recall type of injections.  has been on Norco for many years per PCP, Dr Beverly Madrid. States pain is worse with bending, standing,walking and house work. States pain is across lower back. Denies any bladder or bowel dysfunction. Denies radicular symptoms. States lower back pain worse now. Pain scale 9/10 at worse and 7/10 at best.     Radiology:        CT Lumbar Scan 1/12/22       EXAMINATION: CT LUMBAR SPINE - CT Spine Lumbar W/O Contrast Injection          TECHNIQUE:     Helically acquired images were obtained of the lumbar spine. 2D reformats     were reviewed. A radiation dose optimization technique was used for this     scan. IV Contrast dosage and agent: None. COMPARISON: None.     ____________________________________________          FINDINGS:          VERTEBRAE: No fracture or traumatic subluxation. No discrete lytic or     blastic abnormality observed. Normal alignment.           DISCS and SPINAL CANAL: T10-11 and T11-T12 significant irregular endplate     sclerosis and anterior osteophyte formation. At T11-T12 there is     posterior irregular osteophyte formation that appears to result in     moderate central canal stenosis. Mild degenerative disc and endplate changes lumbar spine with anterior     osteophyte formation. At L5-S1 there is a posterior osteophyte formation     and endplate sclerosis extending into the left neural foramina appears to     result in at least moderate left neural foraminal narrowing. There is at     least mild right neural foraminal narrowing. Significant facet     arthropathy L4-5 and L5-S1 levels with irregular subchondral sclerosis and     vacuum disc phenomenon. VISUALIZED ABDOMEN: Visualized abdominal aorta is not dilated but shows     moderate atherosclerosis. No appreciable luminal narrowing. There is no     retroperitoneal adenopathy. IMPRESSION:          Multilevel degenerative disc disease and facet arthropathy with     significant, right greater than left, bilateral neural foraminal narrowing     at L5-S1. Distal thoracic spine degenerative disc and endplate changes with moderate     central canal stenosis at T11-T12 from posterior osteophyte. Prior Injections: The patient is allergic to tramadol. Subjective:      Review of Systems   Constitutional:  Positive for activity change. Negative for appetite change, chills, diaphoresis, fatigue, fever and unexpected weight change. HENT:  Negative for congestion, ear pain, hearing loss, mouth sores, nosebleeds, rhinorrhea, sinus pressure and sore throat. Eyes:  Negative for photophobia, pain and visual disturbance. Respiratory:  Negative for cough, chest tightness, shortness of breath and wheezing. Smoked until day before CABG. Cardiovascular:  Negative for chest pain and palpitations. CAD S/P CABG 2002.    Gastrointestinal:  Negative for abdominal pain, constipation, diarrhea, nausea and vomiting. Endocrine: Negative for cold intolerance, heat intolerance, polydipsia, polyphagia and polyuria. NIDDM   Genitourinary:  Positive for frequency. Negative for decreased urine volume, difficulty urinating and hematuria. Musculoskeletal:  Positive for back pain, gait problem and neck pain. Negative for arthralgias, joint swelling, myalgias and neck stiffness. Skin:  Negative for color change and rash. Allergic/Immunologic: Negative for food allergies and immunocompromised state. Neurological:  Positive for numbness. Negative for dizziness, tremors, seizures, syncope, facial asymmetry, speech difficulty, weakness, light-headedness and headaches. Hematological:  Does not bruise/bleed easily. Psychiatric/Behavioral:  Positive for sleep disturbance. Negative for agitation, behavioral problems, confusion, decreased concentration, dysphoric mood, hallucinations, self-injury and suicidal ideas. The patient is not nervous/anxious and is not hyperactive. Objective:      Vitals       Vitals:     02/16/23 1434   BP: 128/72   Site: Right Upper Arm   Position: Sitting   Cuff Size: Medium Adult   Weight: 165 lb (74.8 kg)   Height: 5' 0.98\" (1.549 m)            Physical Exam  Vitals and nursing note reviewed. Constitutional:       General: She is not in acute distress. Appearance: She is well-developed. She is not diaphoretic. HENT:      Head: Normocephalic and atraumatic. Right Ear: External ear normal.      Left Ear: External ear normal.      Nose: Nose normal.      Mouth/Throat:      Pharynx: No oropharyngeal exudate. Eyes:      General: No scleral icterus. Right eye: No discharge. Left eye: No discharge. Conjunctiva/sclera: Conjunctivae normal.      Pupils: Pupils are equal, round, and reactive to light. Neck:      Thyroid: No thyromegaly. Cardiovascular:      Rate and Rhythm: Normal rate and regular rhythm. Heart sounds: Normal heart sounds. No murmur heard. No friction rub. No gallop. Pulmonary:      Effort: Pulmonary effort is normal. No respiratory distress. Breath sounds: Normal breath sounds. No wheezing or rales. Chest:      Chest wall: No tenderness. Abdominal:      General: Bowel sounds are normal. There is no distension. Palpations: Abdomen is soft. Tenderness: There is no abdominal tenderness. There is no guarding or rebound. Musculoskeletal:      Cervical back: Neck supple. Spasms and tenderness present. No edema, erythema or rigidity. No muscular tenderness. Decreased range of motion. Thoracic back: Tenderness present. Lumbar back: Tenderness present. Decreased range of motion. Negative right straight leg raise test and negative left straight leg raise test.   Skin:     General: Skin is warm. Coloration: Skin is not pale. Findings: No erythema or rash. Neurological:      Mental Status: She is alert and oriented to person, place, and time. She is not disoriented. Cranial Nerves: No cranial nerve deficit. Sensory: No sensory deficit. Motor: No atrophy or abnormal muscle tone. Coordination: Coordination normal.      Gait: Gait normal.      Deep Tendon Reflexes: Reflexes are normal and symmetric. Babinski sign absent on the right side. Psychiatric:         Attention and Perception: Attention normal. She is attentive. Mood and Affect: Mood normal. Mood is not anxious or depressed. Affect is not labile, blunt, angry or inappropriate. Speech: Speech normal. She is communicative. Speech is not rapid and pressured, delayed, slurred or tangential.         Behavior: Behavior normal. Behavior is not agitated, slowed, aggressive, withdrawn, hyperactive or combative. Behavior is cooperative. Thought Content:  Thought content normal. Thought content is not paranoid or delusional. Thought content does not include homicidal or suicidal ideation. Thought content does not include homicidal or suicidal plan. Cognition and Memory: Cognition normal. Memory is not impaired. She does not exhibit impaired recent memory or impaired remote memory. Judgment: Judgment normal. Judgment is not impulsive or inappropriate. MARIA LUISA  Patricks test  positive  Yeoman's or Gaenslen's  positive  Kemps  positive  Spurlings  na     Assessment:      1. Lumbar spondylosis    2. Lumbar facet arthropathy    3. Thoracic spondylosis    4. Neck pain    5. Cervical radiculitis    6. Chronic pain syndrome       Plan:      Patient read and signed orientation and opioid agreement. OARRS reviewed. Current MED: 20  Patient was not offered naloxone for home. Discussed long term side effects of medications, tolerance, dependency and addiction. UDS preformed today. Patient told can not receive any pain medications from any other source. No evidence of abuse, diversion or aberrant behavior. Prescription Needs: Will prescribe patient a short length of pain medications in good jessica until UDS come back  Medications and/or procedures to improve function and quality of life- patient understanding with this and that may not be pain free  Discussed possible weaning of medication dosing dependent on treatment/procedure results. Discussed with patient about safe storage of medications at home  Testing or Radiology Reviewed:  Reviewed CT Lumbar Spine with patient. Labs:   Procedures: Bilateral L-facet MBB @ L4-5 and L5-S1   Discussed with patient about risks with procedure including infection, reaction to medication, increased pain, or bleeding. Medications:  LAST DOSE OF NORCO WAS IN THE MORNING TODAY. If patient is on blood thinners will need approval to hold: yes    ASA, Plavix  Does patient have implanted device Stimulator, AICD or Pacemaker etc? No            Return Bilateral L-facet MBB @ L4-5 and L5-S1.

## 2023-03-17 NOTE — OP NOTE
Pre-Procedure Note    Patient Name: Yokasta Mathew   YOB: 1960  Room/Bed: [address] [place] [address] Pool/[facility]  Medical Record Number: [id number]  Date: 3/17/23      Indication:  Lower back pain    Consent: On file. Vital Signs:   Vitals:    03/17/23 1215   BP: (!) 143/78   Pulse: 91   Resp: 16   Temp: 97.9 °F (36.6 °C)   SpO2: 94%       Pre-Sedation Documentation and Exam:   Vital signs have been reviewed (see flow sheet for vitals). Sedation/ Anesthesia Plan:   MAC    Patient is an appropriate candidate for plan of sedation: yes    Preoperative Diagnosis:   L-spondylosis    Postoperative Diagnosis:   as above    Procedure Performed:  Diagnostic/Confirmatory median branch blocks at the levels of L4-5 and L5-S1 bilateral under fluoroscopic guidance #1. Indication for the Procedure: The patient has a history of chronic low back pain that is not responding well to the conservative treatment. The patient's pain is mostly axial in nature. Pain is interfering with the activities of daily living. Physical examination revealed facet tenderness and facet loading is positive. The procedure and risks  were discussed with the patient and an informed consent was obtained. Procedure:     Patient is placed in prone position and skin over  the back was prepped and draped in sterile manner. Then using fluoroscopy the junction of the transverse process of the vertebra with the superior process of the facet joint was observed and the view was optimized. The skin and deep tissues posterior were infiltrated with  20  ml of % lidocaine. Then a #22-gauge 3-1/2 [facility] spinal needle was introduced through the skin wheal under fluoroscopy guidance such that the tip of the needle lies at the junction of the transverse process L3/L4/L5  with the superior processes of the facet joint.   Then after negative aspiration a total of 12 ml of 0.25% Marcaine and depomedrol (total 80 mg) was injected through the needles in divided doses.          For L5 Median branch block the junction of the ala of  the sacrum with the superior articular process of the facet joint was taken as a reference point and L4 median branch the junction of the transverse process the L5 with the superolateral possible facet joint was taken to the point and healthy median branch the junction of the transverse process of L4 with the superior lateral process of the facet joint was taken as a reference point. For S1 median branch the most lateral and superior aspect of S1 foramina was taken as a reference point. EBL-0  Patient's vital signs and neurological status remained stable through  the procedure and post procedural  Period. Patient was instructed to keep track of pain for next 24 hours. every hour and bring it with next visit. Patient tollerated the procedure well and was discharged home in stable condition.     Electronically signed by Ismael Wilkins MD on 3/17/23 at 1:02 PM EDT

## 2023-03-30 ENCOUNTER — OFFICE VISIT (OUTPATIENT)
Dept: PHYSICAL MEDICINE AND REHAB | Age: 63
End: 2023-03-30
Payer: COMMERCIAL

## 2023-03-30 ENCOUNTER — TELEPHONE (OUTPATIENT)
Dept: PHYSICAL MEDICINE AND REHAB | Age: 63
End: 2023-03-30

## 2023-03-30 VITALS
DIASTOLIC BLOOD PRESSURE: 70 MMHG | WEIGHT: 171 LBS | BODY MASS INDEX: 31.47 KG/M2 | HEIGHT: 62 IN | SYSTOLIC BLOOD PRESSURE: 140 MMHG

## 2023-03-30 DIAGNOSIS — M47.816 LUMBAR SPONDYLOSIS: ICD-10-CM

## 2023-03-30 DIAGNOSIS — M47.816 LUMBAR FACET ARTHROPATHY: Primary | ICD-10-CM

## 2023-03-30 DIAGNOSIS — M47.816 LUMBAR SPONDYLOSIS: Primary | ICD-10-CM

## 2023-03-30 DIAGNOSIS — M54.2 NECK PAIN: ICD-10-CM

## 2023-03-30 DIAGNOSIS — M47.814 THORACIC SPONDYLOSIS: ICD-10-CM

## 2023-03-30 DIAGNOSIS — M54.12 CERVICAL RADICULITIS: ICD-10-CM

## 2023-03-30 DIAGNOSIS — G89.4 CHRONIC PAIN SYNDROME: ICD-10-CM

## 2023-03-30 DIAGNOSIS — M47.816 LUMBAR FACET ARTHROPATHY: ICD-10-CM

## 2023-03-30 PROCEDURE — 1036F TOBACCO NON-USER: CPT | Performed by: PAIN MEDICINE

## 2023-03-30 PROCEDURE — G8427 DOCREV CUR MEDS BY ELIG CLIN: HCPCS | Performed by: PAIN MEDICINE

## 2023-03-30 PROCEDURE — 99214 OFFICE O/P EST MOD 30 MIN: CPT | Performed by: PAIN MEDICINE

## 2023-03-30 PROCEDURE — 3017F COLORECTAL CA SCREEN DOC REV: CPT | Performed by: PAIN MEDICINE

## 2023-03-30 PROCEDURE — 3078F DIAST BP <80 MM HG: CPT | Performed by: PAIN MEDICINE

## 2023-03-30 PROCEDURE — 3077F SYST BP >= 140 MM HG: CPT | Performed by: PAIN MEDICINE

## 2023-03-30 PROCEDURE — G8417 CALC BMI ABV UP PARAM F/U: HCPCS | Performed by: PAIN MEDICINE

## 2023-03-30 PROCEDURE — G8484 FLU IMMUNIZE NO ADMIN: HCPCS | Performed by: PAIN MEDICINE

## 2023-03-30 RX ORDER — HYDROCODONE BITARTRATE AND ACETAMINOPHEN 5; 325 MG/1; MG/1
1 TABLET ORAL EVERY 8 HOURS PRN
Qty: 90 TABLET | Refills: 0 | Status: SHIPPED | OUTPATIENT
Start: 2023-03-30 | End: 2023-04-29

## 2023-03-30 RX ORDER — MELOXICAM 15 MG/1
15 TABLET ORAL DAILY
COMMUNITY

## 2023-03-30 ASSESSMENT — ENCOUNTER SYMPTOMS
BACK PAIN: 1
EYE PAIN: 0
SORE THROAT: 0
ABDOMINAL PAIN: 0
DIARRHEA: 0
VOMITING: 0
SINUS PRESSURE: 0
CHEST TIGHTNESS: 0
PHOTOPHOBIA: 0
RHINORRHEA: 0
WHEEZING: 0
CONSTIPATION: 0
COUGH: 0
COLOR CHANGE: 0
SHORTNESS OF BREATH: 0
NAUSEA: 0

## 2023-03-30 NOTE — PROGRESS NOTES
Nerves: No cranial nerve deficit. Sensory: No sensory deficit. Motor: No atrophy or abnormal muscle tone. Coordination: Coordination normal.      Gait: Gait normal.      Deep Tendon Reflexes: Reflexes are normal and symmetric. Babinski sign absent on the right side. Psychiatric:         Attention and Perception: Attention normal. She is attentive. Mood and Affect: Mood normal. Mood is not anxious or depressed. Affect is not labile, blunt, angry or inappropriate. Speech: Speech normal. She is communicative. Speech is not rapid and pressured, delayed, slurred or tangential.         Behavior: Behavior normal. Behavior is not agitated, slowed, aggressive, withdrawn, hyperactive or combative. Behavior is cooperative. Thought Content: Thought content normal. Thought content is not paranoid or delusional. Thought content does not include homicidal or suicidal ideation. Thought content does not include homicidal or suicidal plan. Cognition and Memory: Cognition normal. Memory is not impaired. She does not exhibit impaired recent memory or impaired remote memory. Judgment: Judgment normal. Judgment is not impulsive or inappropriate. MARIA LUISA  Patricks test  positive  Yeoman's  or Gaenslen;s positive  Kemps  positive  Spurlings  na       Assessment:     1. Lumbar spondylosis    2. Lumbar facet arthropathy    3. Thoracic spondylosis    4. Neck pain    5. Cervical radiculitis    6. Chronic pain syndrome            Plan:      OARRS reviewed. Current MED: 30  Patient was not offered naloxone for home. Discussed long term side effects of medications, tolerance, dependency and addiction. Previous UDS reviewed  UDS preformed today for compliance. Patient told can not receive any pain medications from any other source. No evidence of abuse, diversion or aberrant behavior.   Medications and/or procedures to improve function and quality of life- patient understanding with

## 2023-05-02 ENCOUNTER — TELEPHONE (OUTPATIENT)
Dept: PHYSICAL MEDICINE AND REHAB | Age: 63
End: 2023-05-02

## 2023-05-10 ENCOUNTER — HOSPITAL ENCOUNTER (OUTPATIENT)
Age: 63
Setting detail: SPECIMEN
Discharge: HOME | End: 2023-05-10

## 2023-05-10 LAB
ABSOLUTE EOS #: 0.17 K/UL (ref 0–0.44)
ABSOLUTE IMMATURE GRANULOCYTE: 0.03 K/UL (ref 0–0.3)
ABSOLUTE LYMPH #: 3.1 K/UL (ref 1.1–3.7)
ABSOLUTE MONO #: 0.57 K/UL (ref 0.1–1.2)
BASOPHILS # BLD: 1 % (ref 0–2)
BASOPHILS ABSOLUTE: 0.04 K/UL (ref 0–0.2)
EOSINOPHILS RELATIVE PERCENT: 2 % (ref 1–4)
HCT VFR BLD AUTO: 37.2 % (ref 36.3–47.1)
HGB BLD-MCNC: 11.5 G/DL (ref 11.9–15.1)
IMMATURE GRANULOCYTES: 0 %
LYMPHOCYTES # BLD: 36 % (ref 24–43)
MCH RBC QN AUTO: 29.2 PG (ref 25.2–33.5)
MCHC RBC AUTO-ENTMCNC: 30.9 G/DL (ref 28.4–34.8)
MCV RBC AUTO: 94.4 FL (ref 82.6–102.9)
MONOCYTES # BLD: 7 % (ref 3–12)
NRBC AUTOMATED: 0 PER 100 WBC
PDW BLD-RTO: 12.9 % (ref 11.8–14.4)
PLATELET # BLD AUTO: 228 K/UL (ref 138–453)
PMV BLD AUTO: 9.6 FL (ref 8.1–13.5)
RBC # BLD: 3.94 M/UL (ref 3.95–5.11)
SEG NEUTROPHILS: 54 % (ref 36–65)
SEGMENTED NEUTROPHILS ABSOLUTE COUNT: 4.82 K/UL (ref 1.5–8.1)
WBC # BLD AUTO: 8.7 K/UL (ref 3.5–11.3)

## 2023-05-11 LAB
ALBUMIN SERPL-MCNC: 4.1 G/DL (ref 3.5–5.2)
ALBUMIN/GLOBULIN RATIO: 1.2 (ref 1–2.5)
ALP SERPL-CCNC: 107 U/L (ref 35–104)
ALT SERPL-CCNC: 31 U/L (ref 5–33)
ANION GAP SERPL CALCULATED.3IONS-SCNC: 12 MMOL/L (ref 9–17)
AST SERPL-CCNC: 28 U/L
BILIRUB SERPL-MCNC: <0.1 MG/DL (ref 0.3–1.2)
BUN SERPL-MCNC: 31 MG/DL (ref 8–23)
CALCIUM SERPL-MCNC: 9.3 MG/DL (ref 8.6–10.4)
CHLORIDE SERPL-SCNC: 106 MMOL/L (ref 98–107)
CHOLEST SERPL-MCNC: 135 MG/DL
CHOLESTEROL/HDL RATIO: 4.7
CO2 SERPL-SCNC: 23 MMOL/L (ref 20–31)
CREAT SERPL-MCNC: 1.17 MG/DL (ref 0.5–0.9)
GFR SERPL CREATININE-BSD FRML MDRD: 52 ML/MIN/1.73M2
GLUCOSE SERPL-MCNC: 208 MG/DL (ref 70–99)
HDLC SERPL-MCNC: 29 MG/DL
LDLC SERPL CALC-MCNC: ABNORMAL MG/DL (ref 0–130)
LDLC SERPL DIRECT ASSAY-MCNC: 49 MG/DL
POTASSIUM SERPL-SCNC: 4.7 MMOL/L (ref 3.7–5.3)
PROT SERPL-MCNC: 7.4 G/DL (ref 6.4–8.3)
SODIUM SERPL-SCNC: 141 MMOL/L (ref 135–144)
TRIGL SERPL-MCNC: 465 MG/DL

## 2023-05-23 NOTE — TELEPHONE ENCOUNTER
Called  PT. AND LVM no refill to be given and needs aptm to discuss further with drDawson As no hydrocodone in last urine screen and ablation not scheduled from last office visit order. Chandrakant Queen

## 2023-07-24 ENCOUNTER — TELEPHONE (OUTPATIENT)
Dept: PHYSICAL MEDICINE AND REHAB | Age: 63
End: 2023-07-24

## 2023-07-24 NOTE — TELEPHONE ENCOUNTER
Called patient regarding referral we received for her right shoulder. I just imaging. Called patient. Left a voice message requesting a call back.

## 2024-01-16 NOTE — PROGRESS NOTES
0800 Care taken over from cath lab. Radial site stable, no bleeding seen, site soft. Armboard continued with vascband. Patient instructed not to bend wrist, not to put pressure on wrist and not to lift  or twist with wrist. Patient voices understanding. pain/decreased strength

## 2024-01-17 ENCOUNTER — OFFICE VISIT (OUTPATIENT)
Dept: CARDIOLOGY CLINIC | Age: 64
End: 2024-01-17
Payer: COMMERCIAL

## 2024-01-17 VITALS
BODY MASS INDEX: 31.17 KG/M2 | HEIGHT: 62 IN | WEIGHT: 169.4 LBS | HEART RATE: 94 BPM | SYSTOLIC BLOOD PRESSURE: 116 MMHG | DIASTOLIC BLOOD PRESSURE: 70 MMHG

## 2024-01-17 DIAGNOSIS — I10 PRIMARY HYPERTENSION: Primary | ICD-10-CM

## 2024-01-17 DIAGNOSIS — R94.31 ABNORMAL EKG: ICD-10-CM

## 2024-01-17 LAB
CHP ED QC CHECK: NORMAL
GLUCOSE BLD-MCNC: NORMAL MG/DL

## 2024-01-17 PROCEDURE — 99214 OFFICE O/P EST MOD 30 MIN: CPT | Performed by: INTERNAL MEDICINE

## 2024-01-17 PROCEDURE — 3078F DIAST BP <80 MM HG: CPT | Performed by: INTERNAL MEDICINE

## 2024-01-17 PROCEDURE — 3074F SYST BP LT 130 MM HG: CPT | Performed by: INTERNAL MEDICINE

## 2024-01-17 PROCEDURE — 1036F TOBACCO NON-USER: CPT | Performed by: INTERNAL MEDICINE

## 2024-01-17 PROCEDURE — G8427 DOCREV CUR MEDS BY ELIG CLIN: HCPCS | Performed by: INTERNAL MEDICINE

## 2024-01-17 PROCEDURE — 3017F COLORECTAL CA SCREEN DOC REV: CPT | Performed by: INTERNAL MEDICINE

## 2024-01-17 PROCEDURE — G8417 CALC BMI ABV UP PARAM F/U: HCPCS | Performed by: INTERNAL MEDICINE

## 2024-01-17 PROCEDURE — G8484 FLU IMMUNIZE NO ADMIN: HCPCS | Performed by: INTERNAL MEDICINE

## 2024-01-17 PROCEDURE — 93000 ELECTROCARDIOGRAM COMPLETE: CPT | Performed by: INTERNAL MEDICINE

## 2024-01-17 RX ORDER — METOPROLOL SUCCINATE 25 MG/1
25 TABLET, EXTENDED RELEASE ORAL 2 TIMES DAILY
COMMUNITY
Start: 2023-10-21

## 2024-01-17 RX ORDER — CYCLOBENZAPRINE HCL 10 MG
10 TABLET ORAL 2 TIMES DAILY PRN
COMMUNITY
Start: 2023-12-26

## 2024-01-17 RX ORDER — HYDROCODONE BITARTRATE AND ACETAMINOPHEN 5; 325 MG/1; MG/1
1 TABLET ORAL EVERY 8 HOURS
COMMUNITY
Start: 2024-01-06

## 2024-01-17 RX ORDER — DAPAGLIFLOZIN 10 MG/1
10 TABLET, FILM COATED ORAL DAILY
COMMUNITY
Start: 2023-12-19

## 2024-01-17 RX ORDER — INSULIN LISPRO 100 [IU]/ML
INJECTION, SOLUTION INTRAVENOUS; SUBCUTANEOUS
COMMUNITY
Start: 2023-12-26

## 2024-01-17 NOTE — PROGRESS NOTES
[facility] [place] [facility]  [facility] CARDIOLOGY  [address]  [address]  [place] [address]  Dept: [phone]  Dept Fax: [phone]  Loc: [phone]    Visit Date: 1/17/2024    Ms. Sirisha Souza is a 63 y.o. female  who presented for:  CAD, CABG   HPI:   HPI   Christine Souza is a pleasant 63 year old female patient who  has a past medical history of CAD in native artery, DDD (degenerative disc disease), Frozen shoulder, GERD (gastroesophageal reflux disease), HTN (hypertension), Hyperlipidemia, Lumbar radiculopathy, Obesity, Osteoarthritis, Tobacco abuse, and Type II or unspecified type diabetes mellitus without mention of complication, not stated as uncontrolled.Her father had CAD, CABG. LHC revealed MV CAD, LM disease. On 8/19/2022, he underwent three vessel CABG (LIMA/LAD, SVG/RI, SVG/PDA). It seems that patient had brief post operative A Fib. Holter on 09/2022 was consistent with normal sinus rhythm. No known recurrence of atrial fibrillation. A 30 day cardiac event monitor on 12/2023 revealed NSR. Patient denies chest pain, shortness of breath, dyspnea on exertion. Has no leg swelling.       Current Outpatient Medications:     meloxicam (MOBIC) 15 MG tablet, Take 15 mg by mouth daily, Disp: , Rfl:     Dulaglutide (TRULICITY SC), Inject into the skin, Disp: , Rfl:     Continuous Blood Gluc  (FREESTYLE CARMENCITA 2 READER) LUCY, 1 kit by Does not apply route 4 times daily, Disp: 1 each, Rfl: 1    Continuous Blood Gluc Sensor (FREESTYLE CARMENCITA 2 SENSOR) MISC, 1 kit by Does not apply route 4 times daily, Disp: 1 each, Rfl: 1    Continuous Blood Gluc Sensor (FREESTYLE CARMENCITA 14 DAY SENSOR) MISC, 1 kit by Does not apply route 4 times daily, Disp: 1 each, Rfl: 1    Continuous Blood Gluc  (FREESTYLE CARMENCITA 14 DAY READER) LUCY, 1 kit by Does not apply route 4 times daily, Disp: 1 each, Rfl: 1    metoprolol tartrate (LOPRESSOR) 25 MG tablet, Take 1 tablet by mouth 2

## 2024-01-17 NOTE — PROGRESS NOTES
Pt here for 1 yr follow up    Pt states she is not having any chest pain,SOB, and no swelling in hands or feet. Pt states she does have flutters,sometimes tingling in hands and feet.    All pt medications, allergies to medications, and pharmacy are updated.

## 2024-06-10 ENCOUNTER — APPOINTMENT (OUTPATIENT)
Dept: CT IMAGING | Age: 64
DRG: 469 | End: 2024-06-10
Payer: COMMERCIAL

## 2024-06-10 ENCOUNTER — APPOINTMENT (OUTPATIENT)
Dept: GENERAL RADIOLOGY | Age: 64
DRG: 469 | End: 2024-06-10
Payer: COMMERCIAL

## 2024-06-10 ENCOUNTER — HOSPITAL ENCOUNTER (INPATIENT)
Age: 64
LOS: 2 days | Discharge: HOME HEALTH | DRG: 469 | End: 2024-06-13
Attending: EMERGENCY MEDICINE | Admitting: INTERNAL MEDICINE
Payer: COMMERCIAL

## 2024-06-10 ENCOUNTER — ANCILLARY PROCEDURE (OUTPATIENT)
Dept: EMERGENCY DEPT | Age: 64
DRG: 469 | End: 2024-06-10
Attending: STUDENT IN AN ORGANIZED HEALTH CARE EDUCATION/TRAINING PROGRAM
Payer: COMMERCIAL

## 2024-06-10 DIAGNOSIS — N17.9 ACUTE KIDNEY INJURY SUPERIMPOSED ON CKD (HCC): ICD-10-CM

## 2024-06-10 DIAGNOSIS — N17.9 AKI (ACUTE KIDNEY INJURY) (HCC): ICD-10-CM

## 2024-06-10 DIAGNOSIS — N18.9 ACUTE KIDNEY INJURY SUPERIMPOSED ON CKD (HCC): ICD-10-CM

## 2024-06-10 DIAGNOSIS — E87.5 HYPERKALEMIA: ICD-10-CM
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
## 2024-06-10 DIAGNOSIS — R55 SYNCOPE, UNSPECIFIED SYNCOPE TYPE: ICD-10-CM

## 2024-06-10 DIAGNOSIS — R29.90 STROKE-LIKE SYMPTOM: ICD-10-CM

## 2024-06-10 DIAGNOSIS — S09.90XA INJURY OF HEAD, INITIAL ENCOUNTER: Primary | ICD-10-CM

## 2024-06-10 PROBLEM — S51.812A SKIN TEAR OF FOREARM WITHOUT COMPLICATION, LEFT, INITIAL ENCOUNTER: Status: ACTIVE | Noted: 2024-06-10
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
## 2024-06-10 PROBLEM — S00.83XA TRAUMATIC HEMATOMA OF FOREHEAD: Status: ACTIVE | Noted: 2024-06-10

## 2024-06-10 LAB
ALBUMIN SERPL BCG-MCNC: 3.8 G/DL (ref 3.5–5.1)
ALP SERPL-CCNC: 101 U/L (ref 38–126)
ALT SERPL W/O P-5'-P-CCNC: 30 U/L (ref 11–66)
ANION GAP SERPL CALC-SCNC: 10 MEQ/L (ref 8–16)
APTT PPP: 29.8 SECONDS (ref 22–38)
AST SERPL-CCNC: 44 U/L (ref 5–40)
BILIRUB SERPL-MCNC: 0.4 MG/DL (ref 0.3–1.2)
BUN SERPL-MCNC: 64 MG/DL (ref 7–22)
CALCIUM SERPL-MCNC: 9.2 MG/DL (ref 8.5–10.5)
CHLORIDE SERPL-SCNC: 97 MEQ/L (ref 98–111)
CO2 SERPL-SCNC: 25 MEQ/L (ref 23–33)
CREAT SERPL-MCNC: 2.8 MG/DL (ref 0.4–1.2)
DEPRECATED RDW RBC AUTO: 42.1 FL (ref 35–45)
ERYTHROCYTE [DISTWIDTH] IN BLOOD BY AUTOMATED COUNT: 12.5 % (ref 11.5–14.5)
GFR SERPL CREATININE-BSD FRML MDRD: 18 ML/MIN/1.73M2
GLUCOSE SERPL-MCNC: 124 MG/DL (ref 70–108)
HCT VFR BLD AUTO: 39.6 % (ref 37–47)
HGB BLD-MCNC: 12.2 GM/DL (ref 12–16)
INR PPP: 0.93 (ref 0.85–1.13)
MCH RBC QN AUTO: 28.4 PG (ref 26–33)
MCHC RBC AUTO-ENTMCNC: 30.8 GM/DL (ref 32.2–35.5)
MCV RBC AUTO: 92.3 FL (ref 81–99)
OSMOLALITY SERPL CALC.SUM OF ELEC: 284.3 MOSMOL/KG (ref 275–300)
PLATELET # BLD AUTO: 265 THOU/MM3 (ref 130–400)
PMV BLD AUTO: 10.1 FL (ref 9.4–12.4)
POTASSIUM SERPL-SCNC: 6.5 MEQ/L (ref 3.5–5.2)
PROT SERPL-MCNC: 7.6 G/DL (ref 6.1–8)
RBC # BLD AUTO: 4.29 MILL/MM3 (ref 4.2–5.4)
SODIUM SERPL-SCNC: 132 MEQ/L (ref 135–145)
WBC # BLD AUTO: 9.7 THOU/MM3 (ref 4.8–10.8)

## 2024-06-10 PROCEDURE — 85610 PROTHROMBIN TIME: CPT

## 2024-06-10 PROCEDURE — 36415 COLL VENOUS BLD VENIPUNCTURE: CPT

## 2024-06-10 PROCEDURE — 70450 CT HEAD/BRAIN W/O DYE: CPT

## 2024-06-10 PROCEDURE — 85027 COMPLETE CBC AUTOMATED: CPT

## 2024-06-10 PROCEDURE — 99285 EMERGENCY DEPT VISIT HI MDM: CPT

## 2024-06-10 PROCEDURE — 2580000003 HC RX 258: Performed by: STUDENT IN AN ORGANIZED HEALTH CARE EDUCATION/TRAINING PROGRAM

## 2024-06-10 PROCEDURE — 84132 ASSAY OF SERUM POTASSIUM: CPT

## 2024-06-10 PROCEDURE — 85730 THROMBOPLASTIN TIME PARTIAL: CPT

## 2024-06-10 PROCEDURE — 83735 ASSAY OF MAGNESIUM: CPT

## 2024-06-10 PROCEDURE — 72125 CT NECK SPINE W/O DYE: CPT

## 2024-06-10 PROCEDURE — 76376 3D RENDER W/INTRP POSTPROCES: CPT

## 2024-06-10 PROCEDURE — 73060 X-RAY EXAM OF HUMERUS: CPT

## 2024-06-10 PROCEDURE — 83690 ASSAY OF LIPASE: CPT

## 2024-06-10 PROCEDURE — 74177 CT ABD & PELVIS W/CONTRAST: CPT

## 2024-06-10 PROCEDURE — 99222 1ST HOSP IP/OBS MODERATE 55: CPT | Performed by: SURGERY

## 2024-06-10 PROCEDURE — 93005 ELECTROCARDIOGRAM TRACING: CPT | Performed by: STUDENT IN AN ORGANIZED HEALTH CARE EDUCATION/TRAINING PROGRAM

## 2024-06-10 PROCEDURE — 72170 X-RAY EXAM OF PELVIS: CPT

## 2024-06-10 PROCEDURE — 76705 ECHO EXAM OF ABDOMEN: CPT

## 2024-06-10 PROCEDURE — 71045 X-RAY EXAM CHEST 1 VIEW: CPT

## 2024-06-10 PROCEDURE — 80053 COMPREHEN METABOLIC PANEL: CPT

## 2024-06-10 PROCEDURE — 71260 CT THORAX DX C+: CPT

## 2024-06-10 PROCEDURE — 6820000002 HC L2 INJURY CALL ACTIVATION: Performed by: SURGERY

## 2024-06-10 PROCEDURE — 86900 BLOOD TYPING SEROLOGIC ABO: CPT

## 2024-06-10 PROCEDURE — 83605 ASSAY OF LACTIC ACID: CPT

## 2024-06-10 PROCEDURE — 6360000004 HC RX CONTRAST MEDICATION: Performed by: EMERGENCY MEDICINE

## 2024-06-10 PROCEDURE — 86850 RBC ANTIBODY SCREEN: CPT

## 2024-06-10 PROCEDURE — 84443 ASSAY THYROID STIM HORMONE: CPT

## 2024-06-10 PROCEDURE — 84439 ASSAY OF FREE THYROXINE: CPT

## 2024-06-10 PROCEDURE — 82010 KETONE BODYS QUAN: CPT

## 2024-06-10 PROCEDURE — 86901 BLOOD TYPING SEROLOGIC RH(D): CPT

## 2024-06-10 RX ORDER — CALCIUM GLUCONATE 10 MG/ML
1000 INJECTION, SOLUTION INTRAVENOUS ONCE
Status: COMPLETED | OUTPATIENT
Start: 2024-06-11 | End: 2024-06-11

## 2024-06-10 RX ORDER — 0.9 % SODIUM CHLORIDE 0.9 %
1000 INTRAVENOUS SOLUTION INTRAVENOUS ONCE
Status: COMPLETED | OUTPATIENT
Start: 2024-06-10 | End: 2024-06-11

## 2024-06-10 RX ORDER — MORPHINE SULFATE 4 MG/ML
4 INJECTION, SOLUTION INTRAMUSCULAR; INTRAVENOUS ONCE
Status: COMPLETED | OUTPATIENT
Start: 2024-06-11 | End: 2024-06-11

## 2024-06-10 RX ADMIN — IOPAMIDOL 80 ML: 755 INJECTION, SOLUTION INTRAVENOUS at 22:56

## 2024-06-10 RX ADMIN — SODIUM CHLORIDE 1000 ML: 9 INJECTION, SOLUTION INTRAVENOUS at 23:52

## 2024-06-11 ENCOUNTER — APPOINTMENT (OUTPATIENT)
Dept: MRI IMAGING | Age: 64
DRG: 469 | End: 2024-06-11
Payer: COMMERCIAL

## 2024-06-11 PROBLEM — E87.5 HYPERKALEMIA: Status: ACTIVE | Noted: 2024-06-11

## 2024-06-11 LAB
ABO: NORMAL
ANION GAP SERPL CALC-SCNC: 7 MEQ/L (ref 8–16)
ANTIBODY SCREEN: NORMAL
B-OH-BUTYR SERPL-MSCNC: 1.94 MG/DL (ref 0.2–2.81)
BACTERIA URNS QL MICRO: ABNORMAL /HPF
BILIRUB UR QL STRIP.AUTO: NEGATIVE
BUN SERPL-MCNC: 54 MG/DL (ref 7–22)
CALCIUM SERPL-MCNC: 9.3 MG/DL (ref 8.5–10.5)
CASTS #/AREA URNS LPF: ABNORMAL /LPF
CASTS 2: ABNORMAL /LPF
CHARACTER UR: CLEAR
CHLORIDE SERPL-SCNC: 103 MEQ/L (ref 98–111)
CHOLEST SERPL-MCNC: 143 MG/DL (ref 100–199)
CO2 SERPL-SCNC: 28 MEQ/L (ref 23–33)
COLOR: YELLOW
CREAT SERPL-MCNC: 2.2 MG/DL (ref 0.4–1.2)
CRYSTALS URNS MICRO: ABNORMAL
DEPRECATED MEAN GLUCOSE BLD GHB EST-ACNC: 306 MG/DL (ref 70–126)
EKG ATRIAL RATE: 73 BPM
EKG ATRIAL RATE: 80 BPM
EKG P AXIS: 45 DEGREES
EKG P AXIS: 70 DEGREES
EKG P-R INTERVAL: 214 MS
EKG P-R INTERVAL: 230 MS
EKG Q-T INTERVAL: 398 MS
EKG Q-T INTERVAL: 410 MS
EKG QRS DURATION: 96 MS
EKG QRS DURATION: 98 MS
EKG QTC CALCULATION (BAZETT): 451 MS
EKG QTC CALCULATION (BAZETT): 459 MS
EKG R AXIS: 26 DEGREES
EKG R AXIS: 8 DEGREES
EKG T AXIS: 46 DEGREES
EKG T AXIS: 53 DEGREES
EKG VENTRICULAR RATE: 73 BPM
EKG VENTRICULAR RATE: 80 BPM
EPITHELIAL CELLS, UA: ABNORMAL /HPF
GFR SERPL CREATININE-BSD FRML MDRD: 24 ML/MIN/1.73M2
GLUCOSE BLD STRIP.AUTO-MCNC: 130 MG/DL (ref 70–108)
GLUCOSE BLD STRIP.AUTO-MCNC: 137 MG/DL (ref 70–108)
GLUCOSE BLD STRIP.AUTO-MCNC: 217 MG/DL (ref 70–108)
GLUCOSE BLD STRIP.AUTO-MCNC: 77 MG/DL (ref 70–108)
GLUCOSE BLD STRIP.AUTO-MCNC: 82 MG/DL (ref 70–108)
GLUCOSE SERPL-MCNC: 86 MG/DL (ref 70–108)
GLUCOSE UR QL STRIP.AUTO: NEGATIVE MG/DL
HBA1C MFR BLD HPLC: 12.2 % (ref 4.4–6.4)
HDLC SERPL-MCNC: 29 MG/DL
HGB UR QL STRIP.AUTO: NEGATIVE
KETONES UR QL STRIP.AUTO: NEGATIVE
LACTIC ACID, SEPSIS: 1.3 MMOL/L (ref 0.5–1.9)
LDLC SERPL CALC-MCNC: 60 MG/DL
LIPASE SERPL-CCNC: 22.5 U/L (ref 5.6–51.3)
MAGNESIUM SERPL-MCNC: 3.2 MG/DL (ref 1.6–2.4)
MAGNESIUM SERPL-MCNC: 3.4 MG/DL (ref 1.6–2.4)
MAGNESIUM SERPL-MCNC: 3.5 MG/DL (ref 1.6–2.4)
MISCELLANEOUS 2: ABNORMAL
NITRITE UR QL STRIP: NEGATIVE
OSMOLALITY SERPL CALC.SUM OF ELEC: 289.7 MOSMOL/KG (ref 275–300)
PH UR STRIP.AUTO: 7.5 [PH] (ref 5–9)
POTASSIUM SERPL-SCNC: 4.8 MEQ/L (ref 3.5–5.2)
POTASSIUM SERPL-SCNC: 5.7 MEQ/L (ref 3.5–5.2)
PROT UR STRIP.AUTO-MCNC: NEGATIVE MG/DL
RBC URINE: ABNORMAL /HPF
RENAL EPI CELLS #/AREA URNS HPF: ABNORMAL /[HPF]
RH FACTOR: NORMAL
SODIUM SERPL-SCNC: 138 MEQ/L (ref 135–145)
SP GR UR REFRACT.AUTO: 1.01 (ref 1–1.03)
T4 FREE SERPL-MCNC: 1.49 NG/DL (ref 0.93–1.68)
TRIGL SERPL-MCNC: 269 MG/DL (ref 0–199)
TROPONIN, HIGH SENSITIVITY: 12 NG/L (ref 0–12)
TSH SERPL DL<=0.005 MIU/L-ACNC: 1.59 UIU/ML (ref 0.4–4.2)
UROBILINOGEN, URINE: 0.2 EU/DL (ref 0–1)
WBC #/AREA URNS HPF: ABNORMAL /HPF
WBC #/AREA URNS HPF: ABNORMAL /[HPF]
YEAST LIKE FUNGI URNS QL MICRO: ABNORMAL

## 2024-06-11 PROCEDURE — 92523 SPEECH SOUND LANG COMPREHEN: CPT

## 2024-06-11 PROCEDURE — 93005 ELECTROCARDIOGRAM TRACING: CPT | Performed by: PHYSICIAN ASSISTANT

## 2024-06-11 PROCEDURE — 97162 PT EVAL MOD COMPLEX 30 MIN: CPT

## 2024-06-11 PROCEDURE — 80061 LIPID PANEL: CPT

## 2024-06-11 PROCEDURE — 97129 THER IVNTJ 1ST 15 MIN: CPT

## 2024-06-11 PROCEDURE — 97110 THERAPEUTIC EXERCISES: CPT

## 2024-06-11 PROCEDURE — 83036 HEMOGLOBIN GLYCOSYLATED A1C: CPT

## 2024-06-11 PROCEDURE — 2500000003 HC RX 250 WO HCPCS: Performed by: STUDENT IN AN ORGANIZED HEALTH CARE EDUCATION/TRAINING PROGRAM

## 2024-06-11 PROCEDURE — 6370000000 HC RX 637 (ALT 250 FOR IP): Performed by: PHYSICIAN ASSISTANT

## 2024-06-11 PROCEDURE — 1200000003 HC TELEMETRY R&B

## 2024-06-11 PROCEDURE — 96375 TX/PRO/DX INJ NEW DRUG ADDON: CPT

## 2024-06-11 PROCEDURE — 6360000002 HC RX W HCPCS: Performed by: STUDENT IN AN ORGANIZED HEALTH CARE EDUCATION/TRAINING PROGRAM

## 2024-06-11 PROCEDURE — 84484 ASSAY OF TROPONIN QUANT: CPT

## 2024-06-11 PROCEDURE — 2580000003 HC RX 258: Performed by: PHYSICIAN ASSISTANT

## 2024-06-11 PROCEDURE — 83735 ASSAY OF MAGNESIUM: CPT

## 2024-06-11 PROCEDURE — 81001 URINALYSIS AUTO W/SCOPE: CPT

## 2024-06-11 PROCEDURE — 97530 THERAPEUTIC ACTIVITIES: CPT

## 2024-06-11 PROCEDURE — 6370000000 HC RX 637 (ALT 250 FOR IP): Performed by: STUDENT IN AN ORGANIZED HEALTH CARE EDUCATION/TRAINING PROGRAM

## 2024-06-11 PROCEDURE — 36415 COLL VENOUS BLD VENIPUNCTURE: CPT

## 2024-06-11 PROCEDURE — 96365 THER/PROPH/DIAG IV INF INIT: CPT

## 2024-06-11 PROCEDURE — 99223 1ST HOSP IP/OBS HIGH 75: CPT | Performed by: PHYSICIAN ASSISTANT

## 2024-06-11 PROCEDURE — 82948 REAGENT STRIP/BLOOD GLUCOSE: CPT

## 2024-06-11 PROCEDURE — 70551 MRI BRAIN STEM W/O DYE: CPT

## 2024-06-11 PROCEDURE — 97166 OT EVAL MOD COMPLEX 45 MIN: CPT

## 2024-06-11 PROCEDURE — 80048 BASIC METABOLIC PNL TOTAL CA: CPT

## 2024-06-11 RX ORDER — HYDROCODONE BITARTRATE AND ACETAMINOPHEN 5; 325 MG/1; MG/1
1 TABLET ORAL EVERY 4 HOURS PRN
Status: DISCONTINUED | OUTPATIENT
Start: 2024-06-11 | End: 2024-06-13 | Stop reason: HOSPADM

## 2024-06-11 RX ORDER — NITROGLYCERIN 0.4 MG/1
0.4 TABLET SUBLINGUAL EVERY 5 MIN PRN
Status: DISCONTINUED | OUTPATIENT
Start: 2024-06-11 | End: 2024-06-13 | Stop reason: HOSPADM

## 2024-06-11 RX ORDER — CLOPIDOGREL BISULFATE 75 MG/1
75 TABLET ORAL DAILY
Status: DISCONTINUED | OUTPATIENT
Start: 2024-06-11 | End: 2024-06-13 | Stop reason: HOSPADM

## 2024-06-11 RX ORDER — ACETAMINOPHEN 325 MG/1
650 TABLET ORAL EVERY 6 HOURS PRN
Status: DISCONTINUED | OUTPATIENT
Start: 2024-06-11 | End: 2024-06-13 | Stop reason: HOSPADM

## 2024-06-11 RX ORDER — INSULIN LISPRO 100 [IU]/ML
15 INJECTION, SOLUTION INTRAVENOUS; SUBCUTANEOUS
Status: DISCONTINUED | OUTPATIENT
Start: 2024-06-11 | End: 2024-06-13 | Stop reason: HOSPADM

## 2024-06-11 RX ORDER — GLUCAGON 1 MG/ML
1 KIT INJECTION PRN
Status: DISCONTINUED | OUTPATIENT
Start: 2024-06-11 | End: 2024-06-13 | Stop reason: HOSPADM

## 2024-06-11 RX ORDER — ONDANSETRON 2 MG/ML
4 INJECTION INTRAMUSCULAR; INTRAVENOUS EVERY 6 HOURS PRN
Status: DISCONTINUED | OUTPATIENT
Start: 2024-06-11 | End: 2024-06-13 | Stop reason: HOSPADM

## 2024-06-11 RX ORDER — SODIUM CHLORIDE, SODIUM LACTATE, POTASSIUM CHLORIDE, CALCIUM CHLORIDE 600; 310; 30; 20 MG/100ML; MG/100ML; MG/100ML; MG/100ML
INJECTION, SOLUTION INTRAVENOUS CONTINUOUS
Status: DISCONTINUED | OUTPATIENT
Start: 2024-06-11 | End: 2024-06-11

## 2024-06-11 RX ORDER — SODIUM CHLORIDE 0.9 % (FLUSH) 0.9 %
10 SYRINGE (ML) INJECTION EVERY 12 HOURS SCHEDULED
Status: DISCONTINUED | OUTPATIENT
Start: 2024-06-11 | End: 2024-06-13 | Stop reason: HOSPADM

## 2024-06-11 RX ORDER — SODIUM CHLORIDE, SODIUM LACTATE, POTASSIUM CHLORIDE, AND CALCIUM CHLORIDE .6; .31; .03; .02 G/100ML; G/100ML; G/100ML; G/100ML
1000 INJECTION, SOLUTION INTRAVENOUS ONCE
Status: COMPLETED | OUTPATIENT
Start: 2024-06-11 | End: 2024-06-11

## 2024-06-11 RX ORDER — ONDANSETRON 4 MG/1
4 TABLET, ORALLY DISINTEGRATING ORAL EVERY 8 HOURS PRN
Status: DISCONTINUED | OUTPATIENT
Start: 2024-06-11 | End: 2024-06-13 | Stop reason: HOSPADM

## 2024-06-11 RX ORDER — SODIUM CHLORIDE 9 MG/ML
INJECTION, SOLUTION INTRAVENOUS CONTINUOUS
Status: DISCONTINUED | OUTPATIENT
Start: 2024-06-11 | End: 2024-06-12

## 2024-06-11 RX ORDER — POLYETHYLENE GLYCOL 3350 17 G/17G
17 POWDER, FOR SOLUTION ORAL DAILY PRN
Status: DISCONTINUED | OUTPATIENT
Start: 2024-06-11 | End: 2024-06-13 | Stop reason: HOSPADM

## 2024-06-11 RX ORDER — METOPROLOL SUCCINATE 25 MG/1
25 TABLET, EXTENDED RELEASE ORAL 2 TIMES DAILY
Status: DISCONTINUED | OUTPATIENT
Start: 2024-06-11 | End: 2024-06-13 | Stop reason: HOSPADM

## 2024-06-11 RX ORDER — ASPIRIN 81 MG/1
81 TABLET ORAL DAILY
Status: DISCONTINUED | OUTPATIENT
Start: 2024-06-11 | End: 2024-06-13 | Stop reason: HOSPADM

## 2024-06-11 RX ORDER — INSULIN GLARGINE 100 [IU]/ML
30 INJECTION, SOLUTION SUBCUTANEOUS 2 TIMES DAILY
Status: DISCONTINUED | OUTPATIENT
Start: 2024-06-11 | End: 2024-06-13 | Stop reason: HOSPADM

## 2024-06-11 RX ORDER — SODIUM CHLORIDE 9 MG/ML
INJECTION, SOLUTION INTRAVENOUS PRN
Status: DISCONTINUED | OUTPATIENT
Start: 2024-06-11 | End: 2024-06-13 | Stop reason: HOSPADM

## 2024-06-11 RX ORDER — INSULIN LISPRO 100 [IU]/ML
0-4 INJECTION, SOLUTION INTRAVENOUS; SUBCUTANEOUS NIGHTLY
Status: DISCONTINUED | OUTPATIENT
Start: 2024-06-11 | End: 2024-06-13 | Stop reason: HOSPADM

## 2024-06-11 RX ORDER — CYCLOBENZAPRINE HCL 10 MG
10 TABLET ORAL 2 TIMES DAILY PRN
Status: DISCONTINUED | OUTPATIENT
Start: 2024-06-12 | End: 2024-06-13 | Stop reason: HOSPADM

## 2024-06-11 RX ORDER — INSULIN LISPRO 100 [IU]/ML
0-16 INJECTION, SOLUTION INTRAVENOUS; SUBCUTANEOUS
Status: DISCONTINUED | OUTPATIENT
Start: 2024-06-11 | End: 2024-06-13 | Stop reason: HOSPADM

## 2024-06-11 RX ORDER — ACETAMINOPHEN 650 MG/1
650 SUPPOSITORY RECTAL EVERY 6 HOURS PRN
Status: DISCONTINUED | OUTPATIENT
Start: 2024-06-11 | End: 2024-06-13 | Stop reason: HOSPADM

## 2024-06-11 RX ORDER — SODIUM CHLORIDE 0.9 % (FLUSH) 0.9 %
10 SYRINGE (ML) INJECTION PRN
Status: DISCONTINUED | OUTPATIENT
Start: 2024-06-11 | End: 2024-06-13 | Stop reason: HOSPADM

## 2024-06-11 RX ORDER — DEXTROSE MONOHYDRATE 100 MG/ML
INJECTION, SOLUTION INTRAVENOUS CONTINUOUS PRN
Status: DISCONTINUED | OUTPATIENT
Start: 2024-06-11 | End: 2024-06-13 | Stop reason: HOSPADM

## 2024-06-11 RX ORDER — ATORVASTATIN CALCIUM 80 MG/1
80 TABLET, FILM COATED ORAL DAILY
Status: DISCONTINUED | OUTPATIENT
Start: 2024-06-11 | End: 2024-06-13 | Stop reason: HOSPADM

## 2024-06-11 RX ORDER — FUROSEMIDE 10 MG/ML
40 INJECTION INTRAMUSCULAR; INTRAVENOUS ONCE
Status: DISCONTINUED | OUTPATIENT
Start: 2024-06-11 | End: 2024-06-11

## 2024-06-11 RX ADMIN — METOPROLOL SUCCINATE 25 MG: 25 TABLET, EXTENDED RELEASE ORAL at 20:39

## 2024-06-11 RX ADMIN — SODIUM ZIRCONIUM CYCLOSILICATE 5 G: 5 POWDER, FOR SUSPENSION ORAL at 02:03

## 2024-06-11 RX ADMIN — SODIUM CHLORIDE, POTASSIUM CHLORIDE, SODIUM LACTATE AND CALCIUM CHLORIDE 1000 ML: 600; 310; 30; 20 INJECTION, SOLUTION INTRAVENOUS at 06:02

## 2024-06-11 RX ADMIN — HYDROCODONE BITARTRATE AND ACETAMINOPHEN 1 TABLET: 5; 325 TABLET ORAL at 20:40

## 2024-06-11 RX ADMIN — SODIUM CHLORIDE, PRESERVATIVE FREE 10 ML: 5 INJECTION INTRAVENOUS at 08:06

## 2024-06-11 RX ADMIN — INSULIN LISPRO 15 UNITS: 100 INJECTION, SOLUTION INTRAVENOUS; SUBCUTANEOUS at 18:30

## 2024-06-11 RX ADMIN — INSULIN GLARGINE 30 UNITS: 100 INJECTION, SOLUTION SUBCUTANEOUS at 20:39

## 2024-06-11 RX ADMIN — EMPAGLIFLOZIN 10 MG: 10 TABLET, FILM COATED ORAL at 08:06

## 2024-06-11 RX ADMIN — SODIUM CHLORIDE: 9 INJECTION, SOLUTION INTRAVENOUS at 20:46

## 2024-06-11 RX ADMIN — SODIUM CHLORIDE: 9 INJECTION, SOLUTION INTRAVENOUS at 08:06

## 2024-06-11 RX ADMIN — INSULIN LISPRO 4 UNITS: 100 INJECTION, SOLUTION INTRAVENOUS; SUBCUTANEOUS at 18:30

## 2024-06-11 RX ADMIN — MORPHINE SULFATE 4 MG: 4 INJECTION, SOLUTION INTRAMUSCULAR; INTRAVENOUS at 00:43

## 2024-06-11 RX ADMIN — ATORVASTATIN CALCIUM 80 MG: 80 TABLET, FILM COATED ORAL at 08:07

## 2024-06-11 RX ADMIN — CALCIUM GLUCONATE 1000 MG: 10 INJECTION, SOLUTION INTRAVENOUS at 00:49

## 2024-06-11 RX ADMIN — SODIUM CHLORIDE, PRESERVATIVE FREE 10 ML: 5 INJECTION INTRAVENOUS at 20:40

## 2024-06-11 ASSESSMENT — PAIN DESCRIPTION - FREQUENCY: FREQUENCY: INTERMITTENT

## 2024-06-11 ASSESSMENT — ENCOUNTER SYMPTOMS
APNEA: 0
SHORTNESS OF BREATH: 0
CONSTIPATION: 0
VOICE CHANGE: 0
EYE REDNESS: 0
SINUS PRESSURE: 0
EYE PAIN: 0
ABDOMINAL DISTENTION: 0
SORE THROAT: 0
CHEST TIGHTNESS: 0
COUGH: 0
BACK PAIN: 0
ABDOMINAL PAIN: 0
DIARRHEA: 0
TROUBLE SWALLOWING: 0
COLOR CHANGE: 0
NAUSEA: 0
PHOTOPHOBIA: 0
STRIDOR: 0
VOMITING: 0
CHOKING: 0
FACIAL SWELLING: 0
BLOOD IN STOOL: 0
EYE ITCHING: 0
EYE DISCHARGE: 0
RHINORRHEA: 0
GASTROINTESTINAL NEGATIVE: 1
WHEEZING: 0
RESPIRATORY NEGATIVE: 1

## 2024-06-11 ASSESSMENT — PAIN - FUNCTIONAL ASSESSMENT
PAIN_FUNCTIONAL_ASSESSMENT: 0-10
PAIN_FUNCTIONAL_ASSESSMENT: NONE - DENIES PAIN
PAIN_FUNCTIONAL_ASSESSMENT: NONE - DENIES PAIN
PAIN_FUNCTIONAL_ASSESSMENT: 0-10
PAIN_FUNCTIONAL_ASSESSMENT: PREVENTS OR INTERFERES SOME ACTIVE ACTIVITIES AND ADLS

## 2024-06-11 ASSESSMENT — PAIN DESCRIPTION - LOCATION: LOCATION: HEAD

## 2024-06-11 ASSESSMENT — PAIN DESCRIPTION - ONSET: ONSET: ON-GOING

## 2024-06-11 ASSESSMENT — PAIN DESCRIPTION - DESCRIPTORS: DESCRIPTORS: ACHING;DISCOMFORT

## 2024-06-11 ASSESSMENT — PAIN SCALES - GENERAL
PAINLEVEL_OUTOF10: 0
PAINLEVEL_OUTOF10: 0
PAINLEVEL_OUTOF10: 5
PAINLEVEL_OUTOF10: 6
PAINLEVEL_OUTOF10: 8

## 2024-06-11 ASSESSMENT — PAIN DESCRIPTION - PAIN TYPE: TYPE: ACUTE PAIN

## 2024-06-11 ASSESSMENT — PAIN DESCRIPTION - ORIENTATION: ORIENTATION: MID

## 2024-06-11 NOTE — CONSULTS
Neurology Consult Note    Date:6/11/2024       Room:[facility]17/017-A  Patient Name:Christine Souza     YOB: 1960     Age:64 y.o.    Requesting Physician: Lora Pantoja PA-C     Reason for Consult:  Evaluate for left sided weakness       Chief Complaint:   Chief Complaint   Patient presents with    Fall    Head Injury       Subjective     Christine Souza is a Tongan speaking 64 y.o. female with a history of CAD, Left frozen shoulder, HTN, HLD, DM, tobacco abuse. Information was obtained using a interpretor #[id number]. Patient reports that she fell down the stairs. She does not remember if she was dizzy or if it was a mechanical fall. She fell on her left side and has bruising along the left side. She has baseline left arm weakness from multiple surgeries. She reports she has no new symptoms other than pain on the left side of her head and leg from the fall. She is at baseline range of motion in all extremities.      Review of Systems   Review of Systems   Constitutional: Negative.    HENT: Negative.     Respiratory: Negative.     Cardiovascular: Negative.    Gastrointestinal: Negative.    Musculoskeletal:  Positive for arthralgias and myalgias.   Skin: Negative.    Neurological:  Positive for weakness.   Psychiatric/Behavioral: Negative.       Medications   Scheduled Meds:    sodium chloride flush  10 mL IntraVENous 2 times per day    empagliflozin  10 mg Oral Daily    insulin glargine  30 Units SubCUTAneous BID    metoprolol succinate  25 mg Oral BID    insulin lispro  0-16 Units SubCUTAneous TID WC    insulin lispro  0-4 Units SubCUTAneous Nightly    insulin lispro  15 Units SubCUTAneous TID WC    atorvastatin  80 mg Oral Daily    [Held by provider] aspirin EC  81 mg Oral Daily    [Held by provider] clopidogrel  75 mg Oral Daily     Continuous Infusions:    sodium chloride      dextrose      sodium chloride 100 mL/hr at 06/11/24 0806     PRN Meds: sodium chloride flush, sodium chloride,

## 2024-06-11 NOTE — ED NOTES
ED to inpatient nurses report      Chief Complaint:  Chief Complaint   Patient presents with    Fall    Head Injury     Present to ED from: home    MOA:     LOC: alert and orientated to name and place and date  Mobility: Requires assistance * 2  Oxygen Baseline: room air    Current needs required: room air     Code Status:   Full Code    What abnormal results were found and what did you give/do to treat them? Morphine, calcium gluconate  Any procedures or intervention occur? CT, Xray    Mental Status:  Level of Consciousness: Alert (0)    Psych Assessment:        Vitals:  Patient Vitals for the past 24 hrs:   BP Temp Temp src Pulse Resp SpO2 Height Weight   06/11/24 0207 108/72 -- -- 76 16 98 % -- --   06/11/24 0129 106/63 -- -- 72 17 94 % -- --   06/11/24 0037 128/69 -- -- -- 15 -- -- --   06/10/24 2347 (!) 176/69 -- -- 76 16 95 % -- --   06/10/24 2249 (!) 121/91 -- -- 77 15 96 % -- --   06/10/24 2241 92/66 -- -- 71 15 94 % -- --   06/10/24 2235 115/62 -- -- 70 16 96 % -- --   06/10/24 2158 93/63 97.9 °F (36.6 °C) Oral 72 14 98 % 1.575 m (5' 2\") 77.1 kg (170 lb)        LDAs:   Peripheral IV 06/10/24 Left Antecubital (Active)   Site Assessment Clean, dry & intact 06/11/24 0129   Line Status Flushed;Normal saline locked 06/11/24 0129   Phlebitis Assessment No symptoms 06/11/24 0129   Infiltration Assessment 0 06/11/24 0129   Dressing Status Clean, dry & intact 06/11/24 0129       Peripheral IV 06/10/24 Right Antecubital (Active)   Site Assessment Clean, dry & intact 06/11/24 0129   Line Status Normal saline locked 06/11/24 0129   Phlebitis Assessment No symptoms 06/11/24 0129   Infiltration Assessment 0 06/11/24 0129   Dressing Status Clean, dry & intact 06/11/24 0129       Ambulatory Status:  No data recorded    Diagnosis:  DISPOSITION Admitted 06/11/2024 02:55:45 AM   Final diagnoses:   Injury of head, initial encounter   Fall, initial encounter   Hyperkalemia   JOANNA (acute kidney injury) (HCC)   Stroke-like symptom

## 2024-06-11 NOTE — ED NOTES
Patient transported to [facility]  by cart in stable condition.   Patient monitored on cardiac telemetry.   IV line is patent.

## 2024-06-11 NOTE — ED TRIAGE NOTES
Patient presents to ED via EMS from home with C/O fall and head injury. Patient  states he was giving her a pedicure and she said she had to go to the bathroom patient  said she went up the stairs and heard her fall and got to her when she was at the bottom of the stairs. EMS states there was about 8-15 wooden stairs she fell down. Patient drowsy upon arrival. Patient speaks Lithuanian.  at bedside at this time. Patient  unsure if patient lost her balance and fell or lost consciousness. Patient is diabetic. EMS states . Patient has a hematoma to left forehead and abrasion to left forearm. Patient is on blood thinners.

## 2024-06-11 NOTE — ED NOTES
Patient returned to room at this time. Family at bedside. Patient VS stable. Telemetry in place. C-collar in place.

## 2024-06-11 NOTE — ED NOTES
Patient is alert talking with family at this time. VS stable. Telemetry in place. Call light in reach. Family at bedside. Patient denies needs at this time.

## 2024-06-11 NOTE — PLAN OF CARE
Pt admitted after midnight secondary to fall down a flight of steps.    Trauma following. Extensive imaging obtained - largely unr for acute processes though noted indeterminate calcification L2-L3 foramen.    MRI brain obtained due to intermittent confusion / speech difficulty / weakness of L side. Neg for acute infarct. Neurology was consulted overnight.    PT/OT/SLP     Hyperkalemia in setting of acute renal dysfunction has resolved. Cr is trending down.     Hypermagnesemia - start NS and repeat Mag this afternoon     Will continue to hold DAPT in setting of scalp hematoma. Hgb in 12 range, better than baseline. Can likely resume in the next couple of days if hematoma remains stable.     Electronically signed by Lora Pantoja PA-C on 6/11/2024 at 7:48 AM

## 2024-06-11 NOTE — PLAN OF CARE
Problem: Discharge Planning  Goal: Discharge to home or other facility with appropriate resources  Outcome: Progressing  Flowsheets (Taken 6/11/2024 0730)  Discharge to home or other facility with appropriate resources:   Identify barriers to discharge with patient and caregiver   Arrange for needed discharge resources and transportation as appropriate   Identify discharge learning needs (meds, wound care, etc)   Arrange for interpreters to assist at discharge as needed   Refer to discharge planning if patient needs post-hospital services based on physician order or complex needs related to functional status, cognitive ability or social support system     Problem: Pain  Goal: Verbalizes/displays adequate comfort level or baseline comfort level  Outcome: Progressing  Flowsheets (Taken 6/11/2024 0730)  Verbalizes/displays adequate comfort level or baseline comfort level:   Encourage patient to monitor pain and request assistance   Assess pain using appropriate pain scale   Administer analgesics based on type and severity of pain and evaluate response   Implement non-pharmacological measures as appropriate and evaluate response   Consider cultural and social influences on pain and pain management   Notify Licensed Independent Practitioner if interventions unsuccessful or patient reports new pain     Problem: ABCDS Injury Assessment  Goal: Absence of physical injury  Outcome: Progressing     Problem: Skin/Tissue Integrity  Goal: Absence of new skin breakdown  Description: 1.  Monitor for areas of redness and/or skin breakdown  2.  Assess vascular access sites hourly  3.  Every 4-6 hours minimum:  Change oxygen saturation probe site  4.  Every 4-6 hours:  If on nasal continuous positive airway pressure, respiratory therapy assess nares and determine need for appliance change or resting period.  Outcome: Progressing

## 2024-06-11 NOTE — ED NOTES
Patient unable to complete NIH at this time. Patient left arm unable to hold up or move at this time. Provider notified. Patient meets trauma criteria. Trauma Level 2 being paged at this time.

## 2024-06-11 NOTE — PROCEDURES
PROCEDURE NOTE  Date: 6/11/2024   Name: Christine Souza  YOB: 1960    Procedures    12 lead EKG completed. Results handed to Penelope MONTES.

## 2024-06-11 NOTE — ED PROVIDER NOTES
[facility] EMERGENCY DEPT    Pt Name: Christine Souza  MRN: [id number]  Birthdate 1960  Date of evaluation: 6/10/2024  Resident Physician: Ekaterina Barrett MD  Supervising Physician: Dr. Lenore Walsh MD    CHIEF COMPLAINT       Chief Complaint   Patient presents with    Fall    Head Injury       HISTORY OF PRESENT ILLNESS   Christine Souza is a 64 y.o. female with PMHx of CAD on Plavix, hypertension, type 2 diabetes  who presents to the emergency department for evaluation of fall, head injury.    Patient is a Ethiopian speaker.   at bedside providing interpretation as well as  at bedside as well.    Per , he was given her a pedicure when she stated that she needed to go the bathroom therefore she went upstairs.   then heard her fall.  Patient fell down approximately 8-15 wooden stairs.  States that she fell and hit her head on the left frontal scalp.  Patient also fell onto her left arm.   is unsure if she lost consciousness and fell or not.  Patient is on Plavix.  Level 2 trauma alert was called as patient was hypotensive on arrival.  93/63.    Of note,  does also state for the past 3 days, patient has been feeling unwell.  States that she has been slurring her speech for the past 3 days.    On arrival, patient with no effort against gravity to the left arm and leg.  Patient also has limb ataxia and slurring speech as well as left-sided facial droop.  Due to the symptoms been ongoing for the past 3 days, no stroke alert was called.  NIH was 10.    The patient has no other acute complaints at this time.    Review of Systems    Negative Except as Documented Above.    PASTMEDICAL HISTORY     Past Medical History:   Diagnosis Date    CAD in native artery 8/10/2022    DDD (degenerative disc disease)     Frozen shoulder     Left with arthritis    GERD (gastroesophageal reflux disease)     HTN (hypertension) 10/31/2013    Hyperlipidemia 2/3/2014    Lumbar

## 2024-06-11 NOTE — H&P
Hospitalist History & Physical         Patient: Christine Souza 64 y.o. female      : 1960  Date of Admission: 6/10/2024  Date of Service: Pt seen/examined on 24 and Admitted to Inpatient with expected LOS greater than two midnights due to medical therapy.         ASSESSMENT AND PLAN    Acute metabolic encephalopathy    endorses confusion, delirium, ataxia, loss of strength/  Address kidney dysfunction- see below   R/o acute CVA   No current signs of infectious or toxicologic process     Possible Acute CVA  MRI brain in am  Neurology consult   Lipid panel and A1C in am   Telemetry monitoring   PT/OT/SLP consult   Holding DAPT due to hematoma - defer restart to dayshift attending following their assessment on injuries       JOANNA on CKD   Creatinine 2.8- baseline around 1.2  Given 1L bolus in ED - additional 1L LR bolus spread over time  Avoid nephrotoxic agents as possible- NSAIDs, etc  Renal adjust medications   Consider nephrology pending updates in am     Hyperkalemia   5.7 in ED- lokelma given   Repeat level pending     CAD  On DAPT but holding very acutely   CABG hx   No active chest pain assessed  Starting on high dose statin   Repeat EKG after review- some concerns in inferior leads upon review   Troponin also pending     Type II diabetes   Lantus - restarting with 30 BID- has 45  BID units listed   Humalog 15 units with meals  High dose sliding scale   Hypoglycemia protocol   Continue SLGT2   Holding other home meds           Data reviewed (unless otherwise discussed in assessment/plan)  EKG:  I have reviewed the EKG with the following interpretation:   Imaging: I have reviewed  with the following interpretation: no pertinent findings except scalp/forehead  hematoma as seen on exam     Labs: Reviewed, see chart and plan above.       =======================================================================    SUBJECTIVE    Chief Complaint:  fall, confusion, loss of

## 2024-06-11 NOTE — CONSULTS
Trauma Consult  Dr. Vladimir Mitchell     Patient:  Christine Souza  Admit date: 6/10/2024   YOB: 1960 Date of Evaluation: 6/10/2024  MRN: [id number]  Acct: [id number]    Injury Date:6/10/2024  Injury time:PTA  PCP: Juana Boateng, SINDHU - CNP   Referring physician: Dr. Walsh    Time of Trauma Surgeon Notification:  2215 Yuliya 10, 2024  Time of Trauma TERRY Arrival: 2227  Time of Trauma Surgeon Arrival: 2300 Yuliya 10, 2024  Services Requested Within 30 Minutes: None   Time Contacted:N/A    Assessment:    Trauma by fall  Forehead hematoma  JOANNA on CKD  Plan:    No acute traumatic injuries requiring admission to the Trauma Service.  Patient will be admitted to the medicine service for JOANNA on CKD    Activation: []Level I (Trauma Alert) [x]Level II (Injury Call) []Level III (Trauma Consult) []Downgraded  Mode of Arrival: EMS transportation  Referring Facility: N/A   Loss of Consciousness [x]No []Yes[]Unknown  Duration(min)  Mechanism of Injury:  []Motor Vehicle crash   []Single Vehicle [] []Passenger []Scene Fatality []Front Seat  []Restrained   []Air Bag Deployed   []Ejected []Rollover []Pedestrian []Trapped   Type of vehicle:   Protective Devices:   []Motorcycle  Wearing Helmet []Yes []No  []Bicycle  Wearing Helmet []Yes []No  [x]Fall   Distance - 8-12 steps   []Assault    Abuse Reported []Yes []No  []Gunshot  []Stabbing  []Work Related  []Burn: []Flame []Scald []Electrical []Chemical []Contact []Inhalation []House Fire  []Other:   Patient Active Problem List   Diagnosis    GERD (gastroesophageal reflux disease)    Acquired spondylolisthesis    Spinal stenosis, lumbar region, with neurogenic claudication    Tobacco abuse    Arthralgia of right hand    HTN (hypertension)    Type 1 diabetes mellitus (HCC)    Insomnia    Hyperlipidemia    Onychomycosis of toenail    CAD in native artery    Abnormal stress test    Other fatigue    CAD, multiple vessel    S/P CABG x 3    Lumbar spondylosis

## 2024-06-11 NOTE — CARE COORDINATION
Case Management Assessment Initial Evaluation    Date/Time of Evaluation: 2024 7:45 AM  Assessment Completed by: Betty Costa RN    If patient is discharged prior to next notation, then this note serves as note for discharge by case management.    Patient Name: Christine Souza                   YOB: 1960  Diagnosis: Hyperkalemia [E87.5]  JOANNA (acute kidney injury) (HCC) [N17.9]  Stroke-like symptom [R29.90]  Injury of head, initial encounter [S09.90XA]  Fall, initial encounter [W19.XXXA]                   Date / Time: 6/10/2024  9:53 PM  Location: [address] [place] [address]     Patient Admission Status: Inpatient   Readmission Risk Low 0-14, Mod 15-19), High > 20: Readmission Risk Score: 11.6    Current PCP: Juana Boateng, APRN - CNP    Additional Case Management Notes: From ED, creatinine 2.2, PT/OT/SLP, diabetes management, Neurology consult.    Procedures: none    Imagin/10 CT Head WO Contrast 1. Left forehead acute hematoma with internal fluid-fluid level, 2.2 cm.   Surrounding scalp swelling.   2. No acute intracranial findings.      MRI Brain WO Contrast 1. Hematoma in the scalp overlying the left frontal calvarium.   2. Mild atrophy and probable ischemic changes in the white matter and in the   kaitlynn. No evidence of an acute infarct.       Patient Goals/Plan/Treatment Preferences: Met with Christine. Used lamguage line, Deanna , session # [id number]. Christine shares that she lives at home with her . She is independent. Plan is to return home with . She denies need for DME and declines HH. Will follow.      24 1200   Service Assessment   Patient Orientation Alert and Oriented   Cognition Alert   History Provided By Patient   Primary Caregiver Self   Support Systems Spouse/Significant Other   Patient's Healthcare Decision Maker is: Patient Declined (Legal Next of Kin Remains as Decision Maker)   PCP Verified by CM Yes   Last Visit to PCP Within last 6

## 2024-06-12 PROBLEM — S09.90XA HEAD INJURY: Status: ACTIVE | Noted: 2024-06-12

## 2024-06-12 LAB
ALBUMIN SERPL BCG-MCNC: 3.3 G/DL (ref 3.5–5.1)
ALP SERPL-CCNC: 89 U/L (ref 38–126)
ALT SERPL W/O P-5'-P-CCNC: 22 U/L (ref 11–66)
ANION GAP SERPL CALC-SCNC: 10 MEQ/L (ref 8–16)
AST SERPL-CCNC: 31 U/L (ref 5–40)
BILIRUB SERPL-MCNC: 0.3 MG/DL (ref 0.3–1.2)
BUN SERPL-MCNC: 31 MG/DL (ref 7–22)
CALCIUM SERPL-MCNC: 8.5 MG/DL (ref 8.5–10.5)
CHLORIDE SERPL-SCNC: 108 MEQ/L (ref 98–111)
CO2 SERPL-SCNC: 21 MEQ/L (ref 23–33)
CREAT SERPL-MCNC: 1.3 MG/DL (ref 0.4–1.2)
GFR SERPL CREATININE-BSD FRML MDRD: 46 ML/MIN/1.73M2
GLUCOSE BLD STRIP.AUTO-MCNC: 127 MG/DL (ref 70–108)
GLUCOSE BLD STRIP.AUTO-MCNC: 186 MG/DL (ref 70–108)
GLUCOSE BLD STRIP.AUTO-MCNC: 190 MG/DL (ref 70–108)
GLUCOSE BLD STRIP.AUTO-MCNC: 191 MG/DL (ref 70–108)
GLUCOSE BLD STRIP.AUTO-MCNC: 210 MG/DL (ref 70–108)
GLUCOSE SERPL-MCNC: 144 MG/DL (ref 70–108)
MAGNESIUM SERPL-MCNC: 2.6 MG/DL (ref 1.6–2.4)
POTASSIUM SERPL-SCNC: 4.4 MEQ/L (ref 3.5–5.2)
PROT SERPL-MCNC: 6.7 G/DL (ref 6.1–8)
SODIUM SERPL-SCNC: 139 MEQ/L (ref 135–145)

## 2024-06-12 PROCEDURE — 97530 THERAPEUTIC ACTIVITIES: CPT

## 2024-06-12 PROCEDURE — 80053 COMPREHEN METABOLIC PANEL: CPT

## 2024-06-12 PROCEDURE — 82948 REAGENT STRIP/BLOOD GLUCOSE: CPT

## 2024-06-12 PROCEDURE — 83735 ASSAY OF MAGNESIUM: CPT

## 2024-06-12 PROCEDURE — 97116 GAIT TRAINING THERAPY: CPT

## 2024-06-12 PROCEDURE — 2580000003 HC RX 258: Performed by: PHYSICIAN ASSISTANT

## 2024-06-12 PROCEDURE — 1200000003 HC TELEMETRY R&B

## 2024-06-12 PROCEDURE — 36415 COLL VENOUS BLD VENIPUNCTURE: CPT

## 2024-06-12 PROCEDURE — 99222 1ST HOSP IP/OBS MODERATE 55: CPT | Performed by: NURSE PRACTITIONER

## 2024-06-12 PROCEDURE — 2580000003 HC RX 258: Performed by: STUDENT IN AN ORGANIZED HEALTH CARE EDUCATION/TRAINING PROGRAM

## 2024-06-12 PROCEDURE — 6370000000 HC RX 637 (ALT 250 FOR IP): Performed by: PHYSICIAN ASSISTANT

## 2024-06-12 PROCEDURE — 97110 THERAPEUTIC EXERCISES: CPT

## 2024-06-12 RX ORDER — SODIUM CHLORIDE 9 MG/ML
INJECTION, SOLUTION INTRAVENOUS CONTINUOUS
Status: DISCONTINUED | OUTPATIENT
Start: 2024-06-12 | End: 2024-06-13

## 2024-06-12 RX ADMIN — HYDROCODONE BITARTRATE AND ACETAMINOPHEN 1 TABLET: 5; 325 TABLET ORAL at 17:59

## 2024-06-12 RX ADMIN — INSULIN LISPRO 15 UNITS: 100 INJECTION, SOLUTION INTRAVENOUS; SUBCUTANEOUS at 10:48

## 2024-06-12 RX ADMIN — METOPROLOL SUCCINATE 25 MG: 25 TABLET, EXTENDED RELEASE ORAL at 09:08

## 2024-06-12 RX ADMIN — EMPAGLIFLOZIN 10 MG: 10 TABLET, FILM COATED ORAL at 09:08

## 2024-06-12 RX ADMIN — HYDROCODONE BITARTRATE AND ACETAMINOPHEN 1 TABLET: 5; 325 TABLET ORAL at 09:08

## 2024-06-12 RX ADMIN — INSULIN GLARGINE 30 UNITS: 100 INJECTION, SOLUTION SUBCUTANEOUS at 09:08

## 2024-06-12 RX ADMIN — INSULIN LISPRO 15 UNITS: 100 INJECTION, SOLUTION INTRAVENOUS; SUBCUTANEOUS at 14:19

## 2024-06-12 RX ADMIN — SODIUM CHLORIDE: 9 INJECTION, SOLUTION INTRAVENOUS at 06:26

## 2024-06-12 RX ADMIN — INSULIN LISPRO 15 UNITS: 100 INJECTION, SOLUTION INTRAVENOUS; SUBCUTANEOUS at 18:47

## 2024-06-12 RX ADMIN — METOPROLOL SUCCINATE 25 MG: 25 TABLET, EXTENDED RELEASE ORAL at 20:52

## 2024-06-12 RX ADMIN — INSULIN GLARGINE 30 UNITS: 100 INJECTION, SOLUTION SUBCUTANEOUS at 20:52

## 2024-06-12 RX ADMIN — SODIUM CHLORIDE: 9 INJECTION, SOLUTION INTRAVENOUS at 20:53

## 2024-06-12 RX ADMIN — ATORVASTATIN CALCIUM 80 MG: 80 TABLET, FILM COATED ORAL at 09:08

## 2024-06-12 ASSESSMENT — PATIENT HEALTH QUESTIONNAIRE - PHQ9
1. LITTLE INTEREST OR PLEASURE IN DOING THINGS: NOT AT ALL
2. FEELING DOWN, DEPRESSED OR HOPELESS: NOT AT ALL
SUM OF ALL RESPONSES TO PHQ9 QUESTIONS 1 & 2: 0
SUM OF ALL RESPONSES TO PHQ QUESTIONS 1-9: 0

## 2024-06-12 ASSESSMENT — PAIN DESCRIPTION - LOCATION
LOCATION: NECK
LOCATION: NECK

## 2024-06-12 ASSESSMENT — PAIN DESCRIPTION - ORIENTATION: ORIENTATION: RIGHT

## 2024-06-12 ASSESSMENT — PAIN SCALES - GENERAL
PAINLEVEL_OUTOF10: 0
PAINLEVEL_OUTOF10: 7
PAINLEVEL_OUTOF10: 7
PAINLEVEL_OUTOF10: 6
PAINLEVEL_OUTOF10: 0
PAINLEVEL_OUTOF10: 0

## 2024-06-12 ASSESSMENT — PAIN DESCRIPTION - FREQUENCY: FREQUENCY: CONTINUOUS

## 2024-06-12 ASSESSMENT — PAIN DESCRIPTION - ONSET: ONSET: ON-GOING

## 2024-06-12 ASSESSMENT — LIFESTYLE VARIABLES
HOW OFTEN DO YOU HAVE A DRINK CONTAINING ALCOHOL: NEVER
HOW MANY STANDARD DRINKS CONTAINING ALCOHOL DO YOU HAVE ON A TYPICAL DAY: PATIENT DOES NOT DRINK

## 2024-06-12 ASSESSMENT — PAIN DESCRIPTION - PAIN TYPE: TYPE: ACUTE PAIN

## 2024-06-12 ASSESSMENT — PAIN DESCRIPTION - DESCRIPTORS: DESCRIPTORS: SHARP

## 2024-06-12 ASSESSMENT — PAIN - FUNCTIONAL ASSESSMENT: PAIN_FUNCTIONAL_ASSESSMENT: PREVENTS OR INTERFERES SOME ACTIVE ACTIVITIES AND ADLS

## 2024-06-12 NOTE — CARE COORDINATION
6/12/24, 2:20 PM EDT    DISCHARGE ON GOING EVALUATION    [facility] day: 1  Location: -17/017-A Reason for admit: Hyperkalemia [E87.5]  JOANNA (acute kidney injury) (HCC) [N17.9]  Stroke-like symptom [R29.90]  Injury of head, initial encounter [S09.90XA]  Fall, initial encounter [W19.XXXA]     Procedures: none    Imaging since last note: none    Barriers to Discharge: Hospitalist and PM&R following. Trauma and Neurology signed off. PT/OT/SLP. IVF. Norco prn. DM management. Asa/Plavix remain on hold. Potassium improving, latest 4.4. Mg 2.6.     PCP: Juana Boateng, SINDHU - CNP  Readmission Risk Score: 12    Patient Goals/Plan/Treatment Preferences: From home w/ . Therapy was recommending IPR vs. SNF. Spoke w/ patient and her . They preferred IPR. PM&R consulted. Received call from Drew, states patient is okay to return home w/  independently.

## 2024-06-12 NOTE — PLAN OF CARE
Problem: Discharge Planning  Goal: Discharge to home or other facility with appropriate resources  Outcome: Progressing  Flowsheets (Taken 6/12/2024 1658)  Discharge to home or other facility with appropriate resources:   Identify barriers to discharge with patient and caregiver   Arrange for needed discharge resources and transportation as appropriate   Identify discharge learning needs (meds, wound care, etc)   Arrange for interpreters to assist at discharge as needed   Refer to discharge planning if patient needs post-hospital services based on physician order or complex needs related to functional status, cognitive ability or social support system     Problem: Pain  Goal: Verbalizes/displays adequate comfort level or baseline comfort level  Outcome: Progressing  Flowsheets (Taken 6/12/2024 1658)  Verbalizes/displays adequate comfort level or baseline comfort level:   Notify Licensed Independent Practitioner if interventions unsuccessful or patient reports new pain   Consider cultural and social influences on pain and pain management   Implement non-pharmacological measures as appropriate and evaluate response   Administer analgesics based on type and severity of pain and evaluate response   Assess pain using appropriate pain scale   Encourage patient to monitor pain and request assistance     Problem: ABCDS Injury Assessment  Goal: Absence of physical injury  Outcome: Progressing  Flowsheets (Taken 6/11/2024 2124 by Reed Martin, RN)  Absence of Physical Injury: Implement safety measures based on patient assessment     Problem: Skin/Tissue Integrity  Goal: Absence of new skin breakdown  Description: 1.  Monitor for areas of redness and/or skin breakdown  2.  Assess vascular access sites hourly  3.  Every 4-6 hours minimum:  Change oxygen saturation probe site  4.  Every 4-6 hours:  If on nasal continuous positive airway pressure, respiratory therapy assess nares and determine need for appliance change or

## 2024-06-12 NOTE — CONSULTS
Physical Medicine & Rehabilitation   Consult Note      Admitting Physician: Breanna Rodriguez MD    Primary Care Provider: Juana Boateng, APRN - CNP     Reason for Consult:  fall at home. Confusion. Rehab needs.     History of Present Illness:  Christine Souza is a 64 y.o. female admitted to [facility] on 6/10/2024. Patient  has a past medical history of CAD in native artery, DDD (degenerative disc disease), Frozen shoulder, GERD (gastroesophageal reflux disease), HTN (hypertension), Hyperlipidemia, Lumbar radiculopathy, Obesity, Osteoarthritis, Tobacco abuse, and Type II or unspecified type diabetes mellitus without mention of complication, not stated as uncontrolled.  Patient presented to [facility] ER after suffering a fall down at home.  was completing foot care on patient when patient got up to use the bathroom. Patient was attempting to go upstairs to the bathroom when  heard the fall and found patient down on the stairs.  reports for the few days prior she was having some confusion and hallucinations, somewhat delirious at times. He also noted her dropping things throughout the day. . Patient was found to have JOANNA, creat 2.2, hyperkalemia at 6.5, hypomagnesia at 3.5. brain, spine and ortho imaging all negative. Patient was admitted for further work up. MRI brain also negative for CVA. Patient was treated with lokelma and K+ decreased. IVF bolus given for JOANNA.     6/12/24: patient seen today in consult. JOANNA improving, creat 1.3. patient just returning from bathroom with . Patient steady with walker.  is happy to help her up and in the room. History provided from .  says today patient is near her baseline, still slower with mobility but states she got up in the hallway today with therapy. Discussed therapy options and timelines for therapy stay.  states he does not think she will agree to stay for that long, she will prefer to go home.

## 2024-06-12 NOTE — PLAN OF CARE
Problem: Discharge Planning  Goal: Discharge to home or other facility with appropriate resources  6/11/2024 2124 by Reed Martin, RN  Outcome: Progressing  Flowsheets (Taken 6/11/2024 2124)  Discharge to home or other facility with appropriate resources:   Identify barriers to discharge with patient and caregiver   Arrange for needed discharge resources and transportation as appropriate   Identify discharge learning needs (meds, wound care, etc)   Refer to discharge planning if patient needs post-hospital services based on physician order or complex needs related to functional status, cognitive ability or social support system     Problem: Pain  Goal: Verbalizes/displays adequate comfort level or baseline comfort level  6/11/2024 2124 by Reed Martin, RN  Outcome: Progressing  Flowsheets (Taken 6/11/2024 2124)  Verbalizes/displays adequate comfort level or baseline comfort level:   Encourage patient to monitor pain and request assistance   Assess pain using appropriate pain scale   Implement non-pharmacological measures as appropriate and evaluate response   Administer analgesics based on type and severity of pain and evaluate response     Problem: ABCDS Injury Assessment  Goal: Absence of physical injury  6/11/2024 2124 by Reed Martin RN  Outcome: Progressing  Flowsheets (Taken 6/11/2024 2124)  Absence of Physical Injury: Implement safety measures based on patient assessment     Problem: Skin/Tissue Integrity  Goal: Absence of new skin breakdown  Description: 1.  Monitor for areas of redness and/or skin breakdown  2.  Assess vascular access sites hourly  3.  Every 4-6 hours minimum:  Change oxygen saturation probe site  4.  Every 4-6 hours:  If on nasal continuous positive airway pressure, respiratory therapy assess nares and determine need for appliance change or resting period.  6/11/2024 2124 by Reed Martin, RN  Outcome: Progressing  Note: No signs of skin breakdown.  Skin warm, dry, and

## 2024-06-13 ENCOUNTER — APPOINTMENT (OUTPATIENT)
Age: 64
DRG: 469 | End: 2024-06-13
Attending: STUDENT IN AN ORGANIZED HEALTH CARE EDUCATION/TRAINING PROGRAM
Payer: COMMERCIAL

## 2024-06-13 VITALS
DIASTOLIC BLOOD PRESSURE: 68 MMHG | OXYGEN SATURATION: 97 % | BODY MASS INDEX: 31.28 KG/M2 | RESPIRATION RATE: 18 BRPM | TEMPERATURE: 98.4 F | WEIGHT: 170 LBS | HEIGHT: 62 IN | HEART RATE: 78 BPM | SYSTOLIC BLOOD PRESSURE: 130 MMHG

## 2024-06-13 PROBLEM — E44.1 MILD MALNUTRITION (HCC): Status: ACTIVE | Noted: 2024-06-13

## 2024-06-13 LAB
ANION GAP SERPL CALC-SCNC: 10 MEQ/L (ref 8–16)
BUN SERPL-MCNC: 20 MG/DL (ref 7–22)
CALCIUM SERPL-MCNC: 8.9 MG/DL (ref 8.5–10.5)
CHLORIDE SERPL-SCNC: 110 MEQ/L (ref 98–111)
CO2 SERPL-SCNC: 20 MEQ/L (ref 23–33)
CREAT SERPL-MCNC: 1.2 MG/DL (ref 0.4–1.2)
ECHO BSA: 2.02 M2
GFR SERPL CREATININE-BSD FRML MDRD: 50 ML/MIN/1.73M2
GLUCOSE BLD STRIP.AUTO-MCNC: 160 MG/DL (ref 70–108)
GLUCOSE SERPL-MCNC: 151 MG/DL (ref 70–108)
POTASSIUM SERPL-SCNC: 4.2 MEQ/L (ref 3.5–5.2)
SODIUM SERPL-SCNC: 140 MEQ/L (ref 135–145)

## 2024-06-13 PROCEDURE — 82948 REAGENT STRIP/BLOOD GLUCOSE: CPT

## 2024-06-13 PROCEDURE — 93270 REMOTE 30 DAY ECG REV/REPORT: CPT

## 2024-06-13 PROCEDURE — 80048 BASIC METABOLIC PNL TOTAL CA: CPT

## 2024-06-13 PROCEDURE — 2580000003 HC RX 258: Performed by: STUDENT IN AN ORGANIZED HEALTH CARE EDUCATION/TRAINING PROGRAM

## 2024-06-13 PROCEDURE — 36415 COLL VENOUS BLD VENIPUNCTURE: CPT

## 2024-06-13 PROCEDURE — 6370000000 HC RX 637 (ALT 250 FOR IP): Performed by: PHYSICIAN ASSISTANT

## 2024-06-13 RX ORDER — LIDOCAINE 4 G/G
1 PATCH TOPICAL DAILY
Qty: 14 EACH | Refills: 0 | Status: SHIPPED | OUTPATIENT
Start: 2024-06-13 | End: 2024-06-27

## 2024-06-13 RX ADMIN — ATORVASTATIN CALCIUM 80 MG: 80 TABLET, FILM COATED ORAL at 09:49

## 2024-06-13 RX ADMIN — INSULIN GLARGINE 30 UNITS: 100 INJECTION, SOLUTION SUBCUTANEOUS at 09:49

## 2024-06-13 RX ADMIN — INSULIN LISPRO 15 UNITS: 100 INJECTION, SOLUTION INTRAVENOUS; SUBCUTANEOUS at 09:49

## 2024-06-13 RX ADMIN — CYCLOBENZAPRINE 10 MG: 10 TABLET, FILM COATED ORAL at 09:49

## 2024-06-13 RX ADMIN — EMPAGLIFLOZIN 10 MG: 10 TABLET, FILM COATED ORAL at 09:49

## 2024-06-13 RX ADMIN — SODIUM CHLORIDE: 9 INJECTION, SOLUTION INTRAVENOUS at 04:01

## 2024-06-13 RX ADMIN — HYDROCODONE BITARTRATE AND ACETAMINOPHEN 1 TABLET: 5; 325 TABLET ORAL at 03:31

## 2024-06-13 RX ADMIN — METOPROLOL SUCCINATE 25 MG: 25 TABLET, EXTENDED RELEASE ORAL at 09:49

## 2024-06-13 RX ADMIN — HYDROCODONE BITARTRATE AND ACETAMINOPHEN 1 TABLET: 5; 325 TABLET ORAL at 09:49

## 2024-06-13 ASSESSMENT — PAIN DESCRIPTION - FREQUENCY
FREQUENCY: INTERMITTENT
FREQUENCY: CONTINUOUS

## 2024-06-13 ASSESSMENT — PAIN DESCRIPTION - ONSET
ONSET: ON-GOING
ONSET: ON-GOING

## 2024-06-13 ASSESSMENT — PAIN DESCRIPTION - DESCRIPTORS
DESCRIPTORS: ACHING;DISCOMFORT
DESCRIPTORS: SHARP

## 2024-06-13 ASSESSMENT — PAIN DESCRIPTION - LOCATION
LOCATION: NECK
LOCATION: HEAD;NECK

## 2024-06-13 ASSESSMENT — PAIN SCALES - GENERAL
PAINLEVEL_OUTOF10: 8
PAINLEVEL_OUTOF10: 7

## 2024-06-13 ASSESSMENT — PAIN DESCRIPTION - PAIN TYPE
TYPE: ACUTE PAIN
TYPE: ACUTE PAIN

## 2024-06-13 ASSESSMENT — PAIN - FUNCTIONAL ASSESSMENT: PAIN_FUNCTIONAL_ASSESSMENT: PREVENTS OR INTERFERES SOME ACTIVE ACTIVITIES AND ADLS

## 2024-06-13 NOTE — CARE COORDINATION
6/13/24, 11:38 AM EDT    Patient goals/plan/ treatment preferences discussed by  and .  Patient goals/plan/ treatment preferences reviewed with patient/ family.  Patient/ family verbalize understanding of discharge plan and are in agreement with goal/plan/treatment preferences.  Understanding was demonstrated using the teach back method.  AVS provided by RN at time of discharge, which includes all necessary medical information pertaining to the patients current course of illness, treatment, post-discharge goals of care, and treatment preferences.     Services At/After Discharge: Home Health  Post-acute (PAC) provider list was provided to patient. Patient was informed of their freedom to choose PAC provider. Discussed and offered to show the patient the relevant PAC Providers quality and resource use measures on Medicare Compare web site via computer based on patient's goals of care and treatment preferences. Questions regarding selection process were answered.   Pt to be discharged to home with spouse. They are agreeable to [facility] which is their preference for Nursing, PT/OT/SLP, and Aide. Referral called to Ramya at [facility]. They can accept.

## 2024-06-13 NOTE — PROGRESS NOTES
[facility]  INPATIENT PHYSICAL THERAPY  DAILY NOTE  [facility] 4A - 4A-17/017-A    Time In: 0755  Time Out: 0838  Timed Code Treatment Minutes: 43 Minutes  Minutes: 43          Date: 2024  Patient Name: Christine Souza,  Gender:  female        MRN: [id number]  : 1960  (64 y.o.)     Referring Practitioner: Last Bo PA  Diagnosis: Hyperkalemia  Additional Pertinent Hx: Christine is a 64 year old female that presents to the Emergency Department via EMS for evaluation of potential injuries sustained when she fell down a flight of steps this evening.  Her  reports that he was giving her a pedicure when she needed to use the restroom and all of a sudden he heard a thud and found her on the floor at the bottom of the steps, drowsy.  He does not believe that she lost consciousness.  She does take Plavix but the  states he is unsure when the last dose was.  She arrives with a large hematoma to the left side of her forehead and a skin tear to her left forearm.  She is having difficulties moving her left arm and holding it up as well as her left leg.  MRI brain negative, CT cervical negative.     Prior Level of Function:  Lives With: Spouse  Type of Home: House  Home Layout: Two level, Bed/Bath upstairs  Home Access: Stairs to enter with rails  Entrance Stairs - Number of Steps: 9 GREGORY  Home Equipment: Walker - Rolling   Bathroom Shower/Tub: Tub/Shower unit  Bathroom Toilet: Standard  Bathroom Equipment: None  Bathroom Accessibility: Accessible    Receives Help From: Family  ADL Assistance: Independent  Homemaking Assistance: Independent  Homemaking Responsibilities: Yes  Ambulation Assistance: Independent  Transfer Assistance: Independent  Active : Yes  Additional Comments: Family reports pt ambulates without AD    Restrictions/Precautions:  Restrictions/Precautions: Fall Risk, General Precautions     SUBJECTIVE: RN okays therapy session. Nursing resting in bed, 
[facility]  SPEECH THERAPY COMMUNICATION NOTE  [facility] 4A      Date: 2024  Patient Name: Christine Souza        MRN: [id number]    : 1960  (64 y.o.)      ST received duplicate order for cognitive assessment. Cognitive evaluation completed on . Please refer to note for further details. Will follow up with ongoing treatment as able.     Priya Grullon M.S. CCC-SLP [id number] 2024    
[facility]  INPATIENT REHABILITATION  ADMISSIONS COORDINATOR CONSULT    Referral Type: internal    Patient Name: Christine Souza      MRN: [id number]    : 1960  (64 y.o.)  Gender: female   Race: /      Payor Source: Payor: [employer] / Plan: [employer] [place] MEDICAID / Product Type: *No Product type* /   Secondary Payor Source:      Isolation Status: No active isolations    Lives With: Spouse  Type of Home: House  Home Layout: Two level, Bed/Bath upstairs  Home Access: Stairs to enter with rails  Entrance Stairs - Number of Steps: 9 GREGORY  Receives Help From: Family  Occupation: On disability  Additional Comments: Family reports pt ambulates without AD    Disciplines Required upon Admission to Inpatient Rehabilitation: Physical Therapy, Occupational Therapy, and Speech Therapy  Post operative: No  Fall: Yes  Dialysis: No  Diet: ADULT ORAL NUTRITION SUPPLEMENT; Breakfast, Lunch, Dinner; Diabetic Oral Supplement  ADULT DIET; Regular; 3 carb choices (45 gm/meal)  Discussed patient with  and PM&R provider: Await medical work up completion including completion of PM&R consult for further determination of patient needs. Patient does require precert for post acute care.    UPDATE: 1:45 Rounded on unit, introduced self to patient and .  Explained that I work with MAREN Barreto whom was just visiting the couple and that I wanted to confirm their wish to return to home after acute hospital stay and not admit to rehab unit here at the hospital.  The patient  states that this is correct that he feels that he can care for the patient at home.  Patient nodded in agreement with what her  was saying.    Norma JO updated via telephone call.    Thank you for the referral.         
[facility]  INPATIENT PHYSICAL THERAPY  EVALUATION  [facility] 4A - 4A-17/017-A    Time In: 1432  Time Out: 1506  Timed Code Treatment Minutes: 24 Minutes  Minutes: 34          Date: 2024  Patient Name: Christine Souza,  Gender:  female        MRN: [id number]  : 1960  (64 y.o.)      Referring Practitioner: Last Bo PA  Diagnosis: Hyperkalemia  Additional Pertinent Hx: Christine is a 64 year old female that presents to the Emergency Department via EMS for evaluation of potential injuries sustained when she fell down a flight of steps this evening.  Her  reports that he was giving her a pedicure when she needed to use the restroom and all of a sudden he heard a thud and found her on the floor at the bottom of the steps, drowsy.  He does not believe that she lost consciousness.  She does take Plavix but the  states he is unsure when the last dose was.  She arrives with a large hematoma to the left side of her forehead and a skin tear to her left forearm.  She is having difficulties moving her left arm and holding it up as well as her left leg.  MRI brain negative, CT cervical negative.     Restrictions/Precautions:  Restrictions/Precautions: Fall Risk    Subjective:  Chart Reviewed: Yes  Patient assessed for rehabilitation services?: Yes  Family / Caregiver Present: Yes  Subjective: RN approved session, pt is seated in recliner with family present, agreeable to PT with encouragement from family.  Pt sleepy throughout.  Family reports concerns over pt's hallucinations, L arm weakness, discussed with RN.    General:  Overall Orientation Status: Within Functional Limits  Vision: Within Functional Limits  Hearing: Within functional limits       Pain: 5/10: L arm, forehead    Vitals: Vitals not assessed per clinical judgement, see nursing flowsheet    Social/Functional History:    Lives With: Spouse  Type of Home: House  Home Layout: Two level, Bed/Bath 
[facility]  SPEECH THERAPY  [facility] 4A  Speech - Language - Cognitive Evaluation + Cognitive Treatment    SLP Individual Minutes  Time In: 949  Time Out: 1035  Minutes: 46  Timed Code Treatment Minutes: 10 Minutes       Date: 2024  Patient Name: Christine Souza      CSN: [id number]   : 1960  (64 y.o.)  Gender: female   Referring Physician:  Last Bo PA   Diagnosis: Hyperkalemia   Precautions: Fall Risk  History of Present Illness/Injury: Patient admitted to [facility] with above diagnosis; please see physician H&P for full report. Per chart review, \"Christine Souza is a 64 y.o. female with PMHx who presents to [facility] with confusion, weakness, slurred speech and injuries from a fall.  The patient's  provided the history due to mentation and language barriers with the patient. Patient's  states she has no been her normal self for the past three days or so. He states she has been intermittently confused, having some hallucinations and delirious behaviors with some slurring of her speech and loss of strength and dropping objects in her hands. Her  states she had gotten up to use the bathroom when he heard a loud sound of her falling to the floor when she was in another room. He states she has not complained of any chest or abdominal pain, fevers, chills, NV/D.\"    MRI Brain WO Contrast (2024)  IMPRESSION:  1. Hematoma in the scalp overlying the left frontal calvarium.  2. Mild atrophy and probable ischemic changes in the white matter and in the  kaitlynn. No evidence of an acute infarct.    ST consulted to further evaluate cognitive function with implementation of goals/POC as clinically indicated.     Past Medical History:   Diagnosis Date    CAD in native artery 8/10/2022    DDD (degenerative disc disease)     Frozen shoulder     Left with arthritis    GERD (gastroesophageal reflux disease)     HTN (hypertension) 
Rounded on unit, spoke with SANDRO Hernandez updated her on the conversation with patient and  yesterday regarding their wish to return to home with home health.    
SBIRT screening completed per trauma protocol. SBIRT Findings are Negative.  Patient denies alcohol and/or drug use. Also denies depressive symptoms.    Brief Intervention and Referral to Treatment Summary N/A    Patient was provided PHQ-9, AUDIT-C and DAST Screening:      PHQ-9 Score:  Negative  AUDIT-C Score:  Negative  DAST Score:   Negative    Patient’s substance use is considered-Negative    Low Risk/Healthy  Moderate Risk  Harmful  Dependent    Patient’s depression is considered:-Negative    Minimal  Mild   Moderate  Moderately Severe  Severe    Brief Education Was Provided N/A    Patient was receptive  Patient was not receptive      Brief Intervention Is Provided (Only for AUDIT-C or DAST) N/A    Patient reports readiness to decrease and/or stop use and a plan was discussed   Patient denies readiness to decrease and/or stop use and a plan was not discussed    Injured Trauma Survivor Screening  1.  When you were injured or right afterward   Did you think you were going to die? RESPONSES; YES+1/NO: NO  Do you think this was done to you intentionally? NO    Since your injury  Have you felt more restless, tense or jumpy than usual? RESPONSES; YES+1/NO: NO  Have you found yourself unable to stop worrying? RESPONSES; YES+1/NO: NO  Do you find yourself thinking that the world is unsafe and that people are not to be trusted? RESPONSES; YES+1/NO: NO    TOTAL SCORE from ITSS Questions 1 and 2:   0  NOTE: A score of greater than or equal to 2 is considered positive for PTSD risk and is to receive a community resource packet to link with appropriate providers.    Recommendations/Referrals for Brief and/or Specialized Treatment Provided to Patient  N/A      
160  Pertinent Meds: 0.9 NaCl at 100 ml/hr, lipitor, jaurdiance, lantus, humalog     Wound Type: None       Current Nutrition Intake & Therapies:    Average Meal Intake:  (not recorded, patient reports she has been eating \"so-so\")  Average Supplements Intake:  (drinking some)  ADULT ORAL NUTRITION SUPPLEMENT; Breakfast, Lunch, Dinner; Diabetic Oral Supplement  ADULT DIET; Regular; 3 carb choices (45 gm/meal)    Anthropometric Measures:  Height: 157.5 cm (5' 2\")  Ideal Body Weight (IBW): 110 lbs (50 kg)    Admission Body Weight: 77.1 kg (170 lb) (6/10/24 nonpitting facial)  Current Body Weight: 77.1 kg (170 lb) (6/10/24 nonpitting facial),     Current BMI (kg/m2): 31.1  Usual Body Weight:  (per patient~ 210# a few years ago, was 169# at doctors visit recently and state dropped to 160# after that.  per EMR: 12/8/22: 165#6oz, office visit 3/30/23: 171#, office visit 1/17/24: 169#6.4oz)                       BMI Categories: Obese Class 2 (BMI 35.0 -39.9)    Estimated Daily Nutrient Needs:  Energy Requirements Based On: Kcal/kg  Weight Used for Energy Requirements:  (77 kg)  Energy (kcal/day): ~ 7895-7628 kcals (15-18 kcals/kg/day)  Weight Used for Protein Requirements: Ideal (50 kg)  Protein (g/day): ~ 40-60 grams (0.8-1.2 grams/kg/day)         Nutrition Diagnosis:   Inadequate oral intake related to inadequate protein-energy intake as evidenced by poor intake prior to admission    Nutrition Interventions:   Food and/or Nutrient Delivery: Continue Current Diet, Continue Oral Nutrition Supplement  Nutrition Education/Counseling: Education completed          Goals:     Goals: PO intake 75% or greater, by next RD assessment       Nutrition Monitoring and Evaluation:      Food/Nutrient Intake Outcomes: Food and Nutrient Intake, Supplement Intake  Physical Signs/Symptoms Outcomes: Biochemical Data, GI Status, Fluid Status or Edema, Nutrition Focused Physical Findings, Skin, Weight    Discharge Planning:    Too soon to 
Assistance, to/from chair with arms. To recliner.    FUNCTIONAL MOBILITY:  Assistive Device: Rolling Walker  Assist Level:  Minimal Assistance and with increased time for completion.   Distance: Not Applicable  From EOB to recliner moved close. ~3 steps         AM-[facility] Inpatient Daily Activity Raw Score: 11  AM-[facility] Inpatient ADL T-Scale Score : 29.04  ADL Inpatient CMS 0-100% Score: 70.42    Activity Tolerance:  Patient tolerance of  treatment: Fair treatment tolerance, Limited by pain, and Limited by fatigue          Assessment:  Assessment: Christine, a 64 y.o. female, presents with hyperkalemia secondary to fall down a flight of steps on 6/10/24. Pt presents with decreased strength, endurance, functional mobility, ROM, safety awareness, balance, and cognition, resulting in decreased ADL and IADL participation. Skilled OT services recommended at this time to increase strength, endurance, balance, safety awareness, cognition, ROM, and functional mobility to increase ADL and IADL participation and independence. Without skilled OT services, pt is at risk for increased falls, caregiver burden, and hospital readmission following discharge.  Performance deficits / Impairments: Decreased functional mobility , Decreased ADL status, Decreased ROM, Decreased strength, Decreased safe awareness, Decreased cognition, Decreased endurance, Decreased balance, Decreased high-level IADLs  Prognosis: Good  REQUIRES OT FOLLOW-UP: Yes  Decision Making: Medium Complexity    Treatment Initiated: Treatment and education initiated within context of evaluation.  Evaluation time included review of current medical information, gathering information related to past medical, social and functional history, completion of standardized testing, formal and informal observation of tasks, assessment of data and development of plan of care and goals.  Treatment time included skilled education and facilitation of tasks to increase safety and independence 
left foraminal stenosis. L3-L4: Mild circumferential disc bulge. Mild bilateral foraminal stenosis. L4-L5: Mild circumferential disc bulge. Moderate bilateral foraminal stenosis. At least moderate spinal canal narrowing. L5-S1: Circumferential disc bulge. Moderate left and severe right foraminal stenosis.     Impression: 1. Indeterminate calcification at the right L2-L3 foramen. Differential could include previous partially calcified disc extrusion, or calcification of the nerve root. If there are radicular symptoms, consider MRI with and without gadolinium for further characterization. 2. No acute fracture. This document has been electronically signed by: Rico Gallego MD on 06/10/2024 11:28 PM All CTs at this facility use dose modulation techniques and iterative reconstructions, and/or weight-based dosing when appropriate to reduce radiation to a low as reasonably achievable.    CT ABDOMEN PELVIS W IV CONTRAST Additional Contrast? None    Result Date: 6/10/2024  CT abdomen and pelvis with contrast Comparison: CT/SR - CT LUMBAR RECONSTRUCTION WO POST PROCESS - 6/10/24 22:46 EDT Findings: Mild breathing artifact throughout the exam. Minor atelectasis at the lung bases. Fatty hepatomegaly. No bowel obstruction, pneumoperitoneum, or pneumatosis. Moderate stool quantity. Uterus and ovaries unremarkable. The bones are intact. Lumbar spine findings including indeterminate calcification at the right L2-L3 foramen are discussed on the comparison exam.     No acute injury of the abdomen or pelvis. This document has been electronically signed by: Rico Gallego MD on 06/10/2024 11:28 PM All CTs at this facility use dose modulation techniques and iterative reconstructions, and/or weight-based dosing when appropriate to reduce radiation to a low as reasonably achievable.    CT head without contrast    Result Date: 6/10/2024  CT head without contrast Comparison: None Findings: No intra-axial mass, midline

## 2024-06-13 NOTE — DISCHARGE SUMMARY
Hospital Medicine Discharge Summary      Patient Identification:   Christine Souza   : 1960  MRN: [id number]   Account: [id number]      Patient's PCP: Juaan Boateng, APRN - CNP    Admit Date: 6/10/2024     Discharge Date: 2024      Admitting Physician: No admitting provider for patient encounter.     Discharge Physician: Breanna Rodriguez MD     Discharge Diagnoses:    Syncope: unclear etiology. Will do 30-day cardiac event monitor to assess for arrhythmias. Follow up with PCP. Cannot rule out hypotension as a cause.  instructed to check her BP at least 2-3 x daily and keep log to take to PCP office visit.     JOANNA on CKD: Creatinine 2.8 on admission- baseline around 1.2, improving. Cr 1.3 this morning. Given 1L bolus in ED - additional 1L LR bolus spread over time. Avoid nephrotoxic agents. Home losartan held. ?mobic on home med list- per , she takes tramadol for pain. Resume losartan on discharge. BMP in 1 week.      Acute metabolic encephalopathy: resolved. unclear etiology. No current signs of infectious or toxicologic process.  reportedly endorsed confusion, delirium, ataxia, loss of strength/ on admission. MRI without acute CVA. Has mild cognitive deficits per SLP eval. AAOx4 during my evaluations. No focal deficits noted.     IDDMII: A1c 12.2 on 2024 from 7.0 on 2022.  Lantus 45  BID   Humalog 15 units with meals  SLGT2 , Trulicity, Tetformin.  Dietitian consulted for education- may benefit from outpatient DM/ Dietitian follow ups regularly.   Discussed importance of weight loss and healthy diet with  who admits that patient is noncompliant.     CAD/ s/p CABG: asymptomatic. On DAPT but holding acutely due to small hematoma/ bruising of left sided forehead. Started on high dose statin. Repeat EKG without acute ischemic changes.   Troponin negative. On BB/ DAPT. Resume DAPT on discharge but  instructed to hold for another 2 days.      Tobacco

## 2024-06-13 NOTE — PLAN OF CARE
Problem: Discharge Planning  Goal: Discharge to home or other facility with appropriate resources  6/12/2024 2125 by Reed Martin, RN  Outcome: Progressing  Flowsheets (Taken 6/12/2024 2125)  Discharge to home or other facility with appropriate resources:   Identify barriers to discharge with patient and caregiver   Arrange for needed discharge resources and transportation as appropriate   Identify discharge learning needs (meds, wound care, etc)   Refer to discharge planning if patient needs post-hospital services based on physician order or complex needs related to functional status, cognitive ability or social support system     Problem: Pain  Goal: Verbalizes/displays adequate comfort level or baseline comfort level  6/12/2024 2125 by Reed Martin RN  Outcome: Progressing  Flowsheets (Taken 6/12/2024 2125)  Verbalizes/displays adequate comfort level or baseline comfort level:   Encourage patient to monitor pain and request assistance   Assess pain using appropriate pain scale   Administer analgesics based on type and severity of pain and evaluate response   Implement non-pharmacological measures as appropriate and evaluate response     Problem: ABCDS Injury Assessment  Goal: Absence of physical injury  6/12/2024 2125 by Reed Martin RN  Outcome: Progressing  Flowsheets (Taken 6/12/2024 2125)  Absence of Physical Injury: Implement safety measures based on patient assessment     Problem: Skin/Tissue Integrity  Goal: Absence of new skin breakdown  Description: 1.  Monitor for areas of redness and/or skin breakdown  2.  Assess vascular access sites hourly  3.  Every 4-6 hours minimum:  Change oxygen saturation probe site  4.  Every 4-6 hours:  If on nasal continuous positive airway pressure, respiratory therapy assess nares and determine need for appliance change or resting period.  6/12/2024 2125 by Reed Martin, RN  Outcome: Progressing  Note: No signs of skin breakdown.  Skin warm, dry, and

## 2024-07-14 LAB — ECHO BSA: 2.02 M2

## 2024-07-15 LAB — ECHO BSA: 2.02 M2

## 2024-11-05 ENCOUNTER — HOSPITAL ENCOUNTER (OUTPATIENT)
Dept: WOMENS IMAGING | Age: 64
Discharge: HOME | End: 2024-11-05
Payer: COMMERCIAL

## 2024-11-05 VITALS — WEIGHT: 169 LBS | BODY MASS INDEX: 31.1 KG/M2 | HEIGHT: 62 IN

## 2024-11-05 DIAGNOSIS — Z12.31 VISIT FOR SCREENING MAMMOGRAM: ICD-10-CM

## 2024-11-05 PROCEDURE — 77063 BREAST TOMOSYNTHESIS BI: CPT

## 2025-02-19 ENCOUNTER — OFFICE VISIT (OUTPATIENT)
Dept: CARDIOLOGY CLINIC | Age: 65
End: 2025-02-19
Payer: MEDICARE

## 2025-02-19 VITALS
WEIGHT: 177 LBS | SYSTOLIC BLOOD PRESSURE: 120 MMHG | BODY MASS INDEX: 32.57 KG/M2 | DIASTOLIC BLOOD PRESSURE: 60 MMHG | HEART RATE: 86 BPM | HEIGHT: 62 IN

## 2025-02-19 DIAGNOSIS — I10 PRIMARY HYPERTENSION: Primary | ICD-10-CM

## 2025-02-19 PROCEDURE — G8417 CALC BMI ABV UP PARAM F/U: HCPCS | Performed by: INTERNAL MEDICINE

## 2025-02-19 PROCEDURE — 99214 OFFICE O/P EST MOD 30 MIN: CPT | Performed by: INTERNAL MEDICINE

## 2025-02-19 PROCEDURE — 3074F SYST BP LT 130 MM HG: CPT | Performed by: INTERNAL MEDICINE

## 2025-02-19 PROCEDURE — 1036F TOBACCO NON-USER: CPT | Performed by: INTERNAL MEDICINE

## 2025-02-19 PROCEDURE — G8427 DOCREV CUR MEDS BY ELIG CLIN: HCPCS | Performed by: INTERNAL MEDICINE

## 2025-02-19 PROCEDURE — 3017F COLORECTAL CA SCREEN DOC REV: CPT | Performed by: INTERNAL MEDICINE

## 2025-02-19 PROCEDURE — 1090F PRES/ABSN URINE INCON ASSESS: CPT | Performed by: INTERNAL MEDICINE

## 2025-02-19 PROCEDURE — 1124F ACP DISCUSS-NO DSCNMKR DOCD: CPT | Performed by: INTERNAL MEDICINE

## 2025-02-19 PROCEDURE — 3078F DIAST BP <80 MM HG: CPT | Performed by: INTERNAL MEDICINE

## 2025-02-19 PROCEDURE — G8400 PT W/DXA NO RESULTS DOC: HCPCS | Performed by: INTERNAL MEDICINE

## 2025-02-19 NOTE — PROGRESS NOTES
[facility] [place] [facility]  [facility] CARDIOLOGY  [address]  [address]  [place] [address]  Dept: [phone]  Dept Fax: [phone]  Loc: [phone]    Visit Date: 2/19/2025  Ms. Sirisha Souza is a 65 y.o. female who presented for:  HTN, CAD, CABG   HPI:   Christine Souza is a pleasant 65 y.o. female who  has a past medical history of CAD in native artery, DDD (degenerative disc disease), Frozen shoulder, GERD (gastroesophageal reflux disease), HTN (hypertension), Hyperlipidemia, Lumbar radiculopathy, Obesity, Osteoarthritis, Tobacco abuse, and Type II or unspecified type diabetes mellitus without mention of complication, not stated as uncontrolled. Her father had CAD, CABG. LHC revealed MV CAD, LM disease. On 8/19/2022, he underwent three vessel CABG (LIMA/LAD, SVG/RI, SVG/PDA). It seems that patient had brief post operative A Fib. Holter on 09/2022 was consistent with normal sinus rhythm. No known recurrence of atrial fibrillation. A 30 day cardiac event monitor on 12/2023 revealed NSR. The patient feels well. Patient denies chest pain, shortness of breath, dyspnea on exertion. Reports being compliant with her medications.       Current Outpatient Medications:     cyclobenzaprine (FLEXERIL) 10 MG tablet, Take 1 tablet by mouth 2 times daily as needed, Disp: , Rfl:     FARXIGA 10 MG tablet, Take 1 tablet by mouth daily (Patient not taking: Reported on 6/11/2024), Disp: , Rfl:     HYDROcodone-acetaminophen (NORCO) 5-325 MG per tablet, Take 1 tablet by mouth every 8 (eight) hours. (Patient not taking: Reported on 6/11/2024), Disp: , Rfl:     insulin lispro, 1 Unit Dial, (HUMALOG/ADMELOG) 100 UNIT/ML SOPN, INJECT 26 UNITS SUBCUTANEOUSLY THREE TIMES DAILY . MAX DAILY DOSE 78 UNITS DAILY., Disp: , Rfl:     metoprolol succinate (TOPROL XL) 25 MG extended release tablet, Take 1 tablet by mouth 2 times daily, Disp: , Rfl:     Dulaglutide (TRULICITY SC), Inject into the skin,

## (undated) DEVICE — SUTURE VCRL + SZ 3-0 L27IN ABSRB WHT CT-1 1/2 CIR VCP258H

## (undated) DEVICE — SUTURE VCRL + SZ 0 L27IN ABSRB VLT L36MM CT-1 1/2 CIR VCPB260H

## (undated) DEVICE — APPLICATOR MEDICATED 26 CC SOLUTION HI LT ORNG CHLORAPREP

## (undated) DEVICE — GLOVE ORANGE PI 7 1/2   MSG9075

## (undated) DEVICE — SUTURE PROL 7-0 L24IN NONABSORBABLE BLU L9.3MM CC 3/8 CIR M8704

## (undated) DEVICE — SURGICAL PROCEDURE PACK OXGNTR ST MARYS

## (undated) DEVICE — SUTURE PROL 3-0 36IN NONABSORB BLU 26MM SH 1/2 CIR P8522H

## (undated) DEVICE — SHEET,DRAPE,3/4,53X77,STERILE: Brand: MEDLINE

## (undated) DEVICE — OPEN HRT CDS LF

## (undated) DEVICE — Z DUPLICATE USE 2431315 SET INSUF TBNG HI FLO W/ SMK EVAC FOR PNEUMOCLEAR

## (undated) DEVICE — SUTURE SZ 7 L18IN NONABSORBABLE SIL CCS L48MM 1/2 CIR STRNM M655G

## (undated) DEVICE — SYRINGE MED 10ML LUERLOCK TIP W/O SFTY DISP

## (undated) DEVICE — SUTURE PROL 6-0 L24IN NONABSORBABLE BLU L13MM C-1 3/8 CIR M8726

## (undated) DEVICE — CLIP LIG M TI 6 SIL HNDL FOR OPN AND ENDOSCP SGL APPL

## (undated) DEVICE — CONNECTOR PERF 3/8X3/8X3/8IN EQL WYE

## (undated) DEVICE — DRAPE SLUSH DISC W44XL66IN ST FOR RND BSIN HUSH SLUSH SYS

## (undated) DEVICE — PACK SUCT CATHETER 14FR OPN WHSTL W NO VLV

## (undated) DEVICE — SUTURE MCRYL SZ 4-0 L27IN ABSRB UD L19MM PS-2 1/2 CIR PRIM Y426H

## (undated) DEVICE — DECANTER FLD 9IN ST BG FOR ASEP TRNSF OF FLD

## (undated) DEVICE — CLIP LIG SM TI 6 BLU HNDL FOR OPN AND ENDOSCP SGL APPL

## (undated) DEVICE — KIT PROC 1 ICG VI AQ SOLVNT DRP SPY ELITE

## (undated) DEVICE — SUTURE PROL SZ 4-0 L36IN NONABSORBABLE BLU L26MM SH 1/2 CIR 8521H

## (undated) DEVICE — NEEDLE SYR 18GA L1.5IN RED PLAS HUB S STL BLNT FILL W/O

## (undated) DEVICE — DRAIN SURG SGL COLL PT TB FOR ATS BG OASIS

## (undated) DEVICE — SUTURE VCRL + SZ 0 L36IN ABSRB VLT L36MM CT-1 1/2 CIR VCP346H

## (undated) DEVICE — SUTURE PROL SZ 3-0 L36IN NONABSORBABLE BLU L26MM SH 1/2 CIR 8522H

## (undated) DEVICE — ADHESIVE SKIN CLSR 0.7ML TOP DERMBND ADV

## (undated) DEVICE — SET AUTOTRNS C175ML BOWL BTM OUTLT RESERVOIRXTRA

## (undated) DEVICE — THORACIC CATHETER,STRAIGHT: Brand: ARGYLE

## (undated) DEVICE — APPLIER CLP L9.38IN M LIG TI DISP STR RNG HNDL LIGACLP

## (undated) DEVICE — BASIC SINGLE BASIN BTC-LF: Brand: MEDLINE INDUSTRIES, INC.

## (undated) DEVICE — LEAD PACE L475MM CHNL A OR V MYOCARDIAL STEROID ELUT SIL

## (undated) DEVICE — EZ GLIDE AORTIC CANNULA: Brand: EDWARDS LIFESCIENCES EZ GLIDE AORTIC CANNULA

## (undated) DEVICE — CHLORAPREP 26ML CLEAR

## (undated) DEVICE — CANNULA 1 PC TRI STG VEN RET POLYVI CHL S STL FLEX 29 FR SGL

## (undated) DEVICE — SUTURE SOFSILK SZ 2 L30IN NONABSORBABLE WHT V-26 L37MM 1/2 CS746

## (undated) DEVICE — APPLIER LIG CLP M L11IN TI STR RNG HNDL FOR 20 CLP DISP

## (undated) DEVICE — THORACIC CATHETER,RIGHT ANGLE: Brand: ARGYLE

## (undated) DEVICE — SUTURE SILK PERMAHAND PRECUT 6 X 30 IN SZ 1 BLK BRAID A307H

## (undated) DEVICE — KIT VEN DRNGE VAC ACCSRY PERF VAVD STOCK W/ SPEC TRAP

## (undated) DEVICE — NEEDLE SPNL 22GA L3.5IN BLK HUB S STL REG WALL FIT STYL W/

## (undated) DEVICE — Device: Brand: SUCTION TIP

## (undated) DEVICE — BLANKET THER AD W24XL60IN FAB COVERING SUP SFT ULT THN LTWT

## (undated) DEVICE — APPLIER CLP L9.375IN APER 2.1MM CLS L3.8MM 20 SM TI CLP

## (undated) DEVICE — PROVE COVER: Brand: UNBRANDED

## (undated) DEVICE — KIT BLWR MISTER 5P 15L W/ TBNG SET IRRIG MIST TO IMPROVE

## (undated) DEVICE — CANNULA PERF 15FR L12.5IN RG STPCOCK WIREWOUND BODY

## (undated) DEVICE — SYSTEM ENDOSCP VES HARV W/ TOOL CANN SEAL SHT PRT BLNT TIP

## (undated) DEVICE — CANNULA PVC RCSP 15FR SLD STYL W/ HNDL OVERALL LEN 11 IN

## (undated) DEVICE — SUTURE VCRL + SZ 2-0 L27IN ABSRB CLR CT-1 1/2 CIR TAPERCUT VCP259H

## (undated) DEVICE — GAUZE SPONGES,USP TYPE VII GAUZE, 12 PLY: Brand: CURITY

## (undated) DEVICE — SUTURE SOFSILK SZ 1 L30IN NABSORBABLE BLK C-17 L39MM 3/8 SS646

## (undated) DEVICE — TOWEL,OR,DSP,ST,BLUE,STD,4/PK,20PK/CS: Brand: MEDLINE

## (undated) DEVICE — FOGARTY - HYDRAGRIP SURGICAL - CLAMP INSERTS: Brand: FOGARTY SOFTJAW

## (undated) DEVICE — APPLICATOR MEDICATED 26 CC SOLUTION CLR STRL CHLORAPREP

## (undated) DEVICE — SUTURE NONABSORBABLE MONOFILAMENT 4-0 RB-1 36 IN BLU PROLENE 8557H

## (undated) DEVICE — DRESSING GRMCDL 6 12FR D1N CNTR HOLE 4MM ANTMCRBL PRTCTVE DI

## (undated) DEVICE — HYPODERMIC SAFETY NEEDLE: Brand: MAGELLAN

## (undated) DEVICE — CANNULA PERF L5.5IN DIA9FR AORT ROOT AG STD TIP W/ VENT LN

## (undated) DEVICE — RETRACTOR SURG INSRT SUT HLD OCTOBASE